# Patient Record
Sex: FEMALE | Race: WHITE | NOT HISPANIC OR LATINO | Employment: FULL TIME | URBAN - METROPOLITAN AREA
[De-identification: names, ages, dates, MRNs, and addresses within clinical notes are randomized per-mention and may not be internally consistent; named-entity substitution may affect disease eponyms.]

---

## 2019-04-23 ENCOUNTER — TELEPHONE (OUTPATIENT)
Dept: ENDOCRINOLOGY | Facility: CLINIC | Age: 39
End: 2019-04-23

## 2019-04-23 ENCOUNTER — OFFICE VISIT (OUTPATIENT)
Dept: ENDOCRINOLOGY | Facility: CLINIC | Age: 39
End: 2019-04-23
Payer: COMMERCIAL

## 2019-04-23 VITALS
HEIGHT: 68 IN | WEIGHT: 234 LBS | BODY MASS INDEX: 35.46 KG/M2 | DIASTOLIC BLOOD PRESSURE: 90 MMHG | SYSTOLIC BLOOD PRESSURE: 138 MMHG

## 2019-04-23 DIAGNOSIS — Z86.39 HISTORY OF GOITER: ICD-10-CM

## 2019-04-23 DIAGNOSIS — E55.9 VITAMIN D DEFICIENCY: ICD-10-CM

## 2019-04-23 DIAGNOSIS — E89.0 POSTOPERATIVE HYPOTHYROIDISM: Primary | ICD-10-CM

## 2019-04-23 DIAGNOSIS — E83.51 HYPOCALCEMIA: ICD-10-CM

## 2019-04-23 PROCEDURE — 99204 OFFICE O/P NEW MOD 45 MIN: CPT | Performed by: INTERNAL MEDICINE

## 2019-04-23 RX ORDER — LEVOTHYROXINE SODIUM 100 MCG
100 TABLET ORAL DAILY
COMMUNITY
End: 2019-05-03 | Stop reason: DRUGHIGH

## 2019-04-23 RX ORDER — LEVOTHYROXINE SODIUM 0.1 MG/1
100 TABLET ORAL DAILY
COMMUNITY
End: 2019-04-23

## 2019-04-24 ENCOUNTER — TELEPHONE (OUTPATIENT)
Dept: ENDOCRINOLOGY | Facility: CLINIC | Age: 39
End: 2019-04-24

## 2019-04-24 DIAGNOSIS — E55.9 VITAMIN D DEFICIENCY: ICD-10-CM

## 2019-04-24 RX ORDER — PHENOL 1.4 %
AEROSOL, SPRAY (ML) MUCOUS MEMBRANE
Start: 2019-04-24 | End: 2019-07-10

## 2019-04-29 ENCOUNTER — APPOINTMENT (OUTPATIENT)
Dept: LAB | Facility: CLINIC | Age: 39
End: 2019-04-29
Payer: COMMERCIAL

## 2019-04-29 DIAGNOSIS — E55.9 VITAMIN D DEFICIENCY: ICD-10-CM

## 2019-04-29 DIAGNOSIS — E83.51 HYPOCALCEMIA: ICD-10-CM

## 2019-04-29 DIAGNOSIS — E89.0 POSTOPERATIVE HYPOTHYROIDISM: ICD-10-CM

## 2019-04-29 LAB
25(OH)D3 SERPL-MCNC: 19.3 NG/ML (ref 30–100)
ALBUMIN SERPL BCP-MCNC: 3.7 G/DL (ref 3.5–5)
ANION GAP SERPL CALCULATED.3IONS-SCNC: 11 MMOL/L (ref 4–13)
BUN SERPL-MCNC: 24 MG/DL (ref 5–25)
CALCIUM SERPL-MCNC: 8.3 MG/DL (ref 8.3–10.1)
CHLORIDE SERPL-SCNC: 104 MMOL/L (ref 100–108)
CO2 SERPL-SCNC: 25 MMOL/L (ref 21–32)
CREAT SERPL-MCNC: 1.04 MG/DL (ref 0.6–1.3)
GFR SERPL CREATININE-BSD FRML MDRD: 67 ML/MIN/1.73SQ M
GLUCOSE P FAST SERPL-MCNC: 93 MG/DL (ref 65–99)
POTASSIUM SERPL-SCNC: 3.5 MMOL/L (ref 3.5–5.3)
PTH-INTACT SERPL-MCNC: <6.3 PG/ML (ref 18.4–80.1)
SODIUM SERPL-SCNC: 140 MMOL/L (ref 136–145)
T4 FREE SERPL-MCNC: 0.29 NG/DL (ref 0.76–1.46)
TSH SERPL DL<=0.05 MIU/L-ACNC: 81.55 UIU/ML (ref 0.36–3.74)

## 2019-04-29 PROCEDURE — 83970 ASSAY OF PARATHORMONE: CPT

## 2019-04-29 PROCEDURE — 82040 ASSAY OF SERUM ALBUMIN: CPT

## 2019-04-29 PROCEDURE — 36415 COLL VENOUS BLD VENIPUNCTURE: CPT

## 2019-04-29 PROCEDURE — 84439 ASSAY OF FREE THYROXINE: CPT

## 2019-04-29 PROCEDURE — 80048 BASIC METABOLIC PNL TOTAL CA: CPT

## 2019-04-29 PROCEDURE — 84443 ASSAY THYROID STIM HORMONE: CPT

## 2019-04-29 PROCEDURE — 82306 VITAMIN D 25 HYDROXY: CPT

## 2019-05-01 ENCOUNTER — TELEPHONE (OUTPATIENT)
Dept: ENDOCRINOLOGY | Facility: CLINIC | Age: 39
End: 2019-05-01

## 2019-05-03 DIAGNOSIS — E89.0 POSTOPERATIVE HYPOTHYROIDISM: Primary | ICD-10-CM

## 2019-05-03 RX ORDER — LEVOTHYROXINE SODIUM 137 MCG
137 TABLET ORAL DAILY
COMMUNITY
End: 2019-05-03 | Stop reason: DRUGHIGH

## 2019-05-03 RX ORDER — LEVOTHYROXINE SODIUM 137 MCG
137 TABLET ORAL DAILY
Qty: 30 TABLET | Refills: 2 | Status: SHIPPED | OUTPATIENT
Start: 2019-05-03 | End: 2019-05-26 | Stop reason: SDUPTHER

## 2019-05-26 DIAGNOSIS — E89.0 POSTOPERATIVE HYPOTHYROIDISM: ICD-10-CM

## 2019-05-28 RX ORDER — LEVOTHYROXINE SODIUM 137 MCG
TABLET ORAL
Qty: 30 TABLET | Refills: 0 | Status: SHIPPED | OUTPATIENT
Start: 2019-05-28 | End: 2019-07-10 | Stop reason: DRUGHIGH

## 2019-07-09 ENCOUNTER — APPOINTMENT (OUTPATIENT)
Dept: LAB | Facility: CLINIC | Age: 39
End: 2019-07-09
Payer: COMMERCIAL

## 2019-07-09 DIAGNOSIS — E83.51 HYPOCALCEMIA: ICD-10-CM

## 2019-07-09 DIAGNOSIS — E89.0 POSTOPERATIVE HYPOTHYROIDISM: ICD-10-CM

## 2019-07-09 LAB
ANION GAP SERPL CALCULATED.3IONS-SCNC: 7 MMOL/L (ref 4–13)
BUN SERPL-MCNC: 15 MG/DL (ref 5–25)
CALCIUM SERPL-MCNC: 7.4 MG/DL (ref 8.3–10.1)
CHLORIDE SERPL-SCNC: 103 MMOL/L (ref 100–108)
CO2 SERPL-SCNC: 28 MMOL/L (ref 21–32)
CREAT SERPL-MCNC: 0.95 MG/DL (ref 0.6–1.3)
GFR SERPL CREATININE-BSD FRML MDRD: 75 ML/MIN/1.73SQ M
GLUCOSE P FAST SERPL-MCNC: 85 MG/DL (ref 65–99)
POTASSIUM SERPL-SCNC: 4 MMOL/L (ref 3.5–5.3)
SODIUM SERPL-SCNC: 138 MMOL/L (ref 136–145)
T4 FREE SERPL-MCNC: 0.82 NG/DL (ref 0.76–1.46)
TSH SERPL DL<=0.05 MIU/L-ACNC: 27.11 UIU/ML (ref 0.36–3.74)

## 2019-07-09 PROCEDURE — 84443 ASSAY THYROID STIM HORMONE: CPT

## 2019-07-09 PROCEDURE — 36415 COLL VENOUS BLD VENIPUNCTURE: CPT

## 2019-07-09 PROCEDURE — 84439 ASSAY OF FREE THYROXINE: CPT

## 2019-07-09 PROCEDURE — 80048 BASIC METABOLIC PNL TOTAL CA: CPT

## 2019-07-10 ENCOUNTER — OFFICE VISIT (OUTPATIENT)
Dept: ENDOCRINOLOGY | Facility: CLINIC | Age: 39
End: 2019-07-10

## 2019-07-10 VITALS
SYSTOLIC BLOOD PRESSURE: 110 MMHG | HEIGHT: 68 IN | WEIGHT: 223 LBS | DIASTOLIC BLOOD PRESSURE: 60 MMHG | BODY MASS INDEX: 33.8 KG/M2

## 2019-07-10 DIAGNOSIS — E89.0 POSTOPERATIVE HYPOTHYROIDISM: ICD-10-CM

## 2019-07-10 DIAGNOSIS — E89.0 POSTOPERATIVE HYPOTHYROIDISM: Primary | ICD-10-CM

## 2019-07-10 DIAGNOSIS — E55.9 VITAMIN D DEFICIENCY: ICD-10-CM

## 2019-07-10 DIAGNOSIS — E83.51 HYPOCALCEMIA: ICD-10-CM

## 2019-07-10 PROCEDURE — 99214 OFFICE O/P EST MOD 30 MIN: CPT | Performed by: PHYSICIAN ASSISTANT

## 2019-07-10 RX ORDER — ACETAMINOPHEN 160 MG
2000 TABLET,DISINTEGRATING ORAL DAILY
Start: 2019-07-10 | End: 2019-12-02

## 2019-07-10 RX ORDER — CALCITRIOL 0.25 UG/1
0.25 CAPSULE, LIQUID FILLED ORAL DAILY
Qty: 90 CAPSULE | Refills: 1 | Status: SHIPPED | OUTPATIENT
Start: 2019-07-10 | End: 2019-12-02

## 2019-07-10 RX ORDER — LEVOTHYROXINE SODIUM 137 MCG
TABLET ORAL
Qty: 30 TABLET | Refills: 0
Start: 2019-07-10 | End: 2019-07-10

## 2019-07-10 RX ORDER — LEVOTHYROXINE SODIUM 0.15 MG/1
150 TABLET ORAL DAILY
Qty: 30 TABLET | Refills: 3 | Status: SHIPPED | OUTPATIENT
Start: 2019-07-10 | End: 2019-10-16 | Stop reason: SDUPTHER

## 2019-07-10 NOTE — TELEPHONE ENCOUNTER
----- Message from Fabian Rivers MD sent at 7/9/2019  3:05 PM EDT -----  Please call the patient regarding her abnormal result  please  Inform pt to increase the dose by taking extra tablet of 137 mcg every Thursday    She should follow up as scheduled

## 2019-07-10 NOTE — PROGRESS NOTES
Patient Progress Note    CC: hypothyroidism     Referring Provider  Wing Allen, [address] [facility], [address] [facility]     History of Present Illness:     Patient is a 25-year-old female with postsurgical hypothyroidism and hypoparathyroidism  She had a total thyroidectomy in 2015 for goiter causing obstructive symptoms  She reports pathology was benign  Patient is on Synthroid 137 mcg 1 tablet daily except 2 tablets on Thursday  Patient is taking it 1 hr before breakfast on an empty stomach and at least 4 hrs apart from supplements  Tolerating medication well  Most recent thyroid function tests abnormal, TSH elevated at 27 115 but improved from 81 546  And free T4 normal at 0 82, improved from 0 29  Dose of levothyroxine was adjusted after reviewing most recent labs  PTH in April 2019 was low, less than 6 3  Calcium was normal at that time a 8 3 but repeat calcium yesterday was low at 7 4  In the past she was on Rocaltrol but reports her endocrinologists had her discontinue it as calcium levels had improved  Denies muscle weakness/twitching/spasms, paresthesias, excessive fatigue  Vitamin-D low in April at 19 3  Patient is not currently taking any vitamin D3 supplementation        Patient Active Problem List   Diagnosis    Hypocalcemia    Postoperative hypothyroidism    Vitamin D deficiency     Past Medical History:   Diagnosis Date    H/O total thyroidectomy 10/2016    History of parathyroidectomy 10/2015      Past Surgical History:   Procedure Laterality Date    PARATHYROIDECTOMY  10/2015    TOTAL THYROIDECTOMY  10/2015      Family History   Problem Relation Age of Onset    Hypothyroidism Mother     Hypothyroidism Maternal Grandmother     Diabetes type II Maternal Grandmother      Social History     Tobacco Use    Smoking status: Current Every Day Smoker    Smokeless tobacco: Never Used   Substance Use Topics    Alcohol use: Never     Frequency: Never     Allergies   Allergen Reactions    Latex      Other reaction(s): swollen sore sensation     Current Outpatient Medications   Medication Sig Dispense Refill    calcitriol (ROCALTROL) 0 25 mcg capsule Take 1 capsule (0 25 mcg total) by mouth daily 90 capsule 1    Cholecalciferol (VITAMIN D3) 2000 units capsule Take 1 capsule (2,000 Units total) by mouth daily      levothyroxine (SYNTHROID) 150 mcg tablet Take 1 tablet (150 mcg total) by mouth daily (BRAND NECESSARY) 30 tablet 3     No current facility-administered medications for this visit  Review of Systems   Constitutional: Negative for activity change, appetite change, fatigue and unexpected weight change  HENT: Negative for trouble swallowing  Eyes: Negative for visual disturbance  Respiratory: Negative for shortness of breath  Cardiovascular: Negative for chest pain and palpitations  Gastrointestinal: Negative for constipation and diarrhea  Endocrine: Negative for cold intolerance and heat intolerance  Musculoskeletal: Negative  Neurological: Negative for dizziness, tremors and weakness  Psychiatric/Behavioral: Negative  Physical Exam:  Body mass index is 33 91 kg/m²  /60   Ht 5' 8" (1 727 m)   Wt 101 kg (223 lb)   BMI 33 91 kg/m²    Wt Readings from Last 3 Encounters:   07/10/19 101 kg (223 lb)   04/23/19 106 kg (234 lb)       Physical Exam   Constitutional: She appears well-developed and well-nourished  HENT:   Head: Normocephalic  Eyes: Pupils are equal, round, and reactive to light  EOM are normal  No scleral icterus  Neck: Neck supple  No thyromegaly present  Cardiovascular: Normal rate and regular rhythm  No murmur heard  Pulses:       Radial pulses are 2+ on the right side, and 2+ on the left side  Pulmonary/Chest: Effort normal and breath sounds normal  No respiratory distress  She has no wheezes  Neurological: She is alert  She has normal reflexes  Negative Chovstek's sign   Skin: Skin is warm and dry  Psychiatric: She has a normal mood and affect  Nursing note and vitals reviewed  Patient's shoes and socks were not removed  Labs:   Component      Latest Ref Rng & Units 4/29/2019 7/9/2019   Sodium      136 - 145 mmol/L 140 138   Potassium      3 5 - 5 3 mmol/L 3 5 4 0   Chloride      100 - 108 mmol/L 104 103   CO2      21 - 32 mmol/L 25 28   Anion Gap      4 - 13 mmol/L 11 7   BUN      5 - 25 mg/dL 24 15   Creatinine      0 60 - 1 30 mg/dL 1 04 0 95   GLUCOSE FASTING      65 - 99 mg/dL 93 85   Calcium      8 3 - 10 1 mg/dL 8 3 7 4 (L)   eGFR      ml/min/1 73sq m 67 75   Albumin      3 5 - 5 0 g/dL 3 7    PARATHYROID HORMONE      18 4 - 80 1 pg/mL <6 3 (L)    Vit D, 25-Hydroxy      30 0 - 100 0 ng/mL 19 3 (L)    TSH 3RD GENERATON      0 358 - 3 740 uIU/mL 81 546 (H) 27 115 (H)   Free T4      0 76 - 1 46 ng/dL 0 29 (L) 0 82       Plan:    Diagnoses and all orders for this visit:    Postoperative hypothyroidism  Thyroid function tests remain abnormal but have improved  TSH 27 115 and free T4 0 82  Increase Synthroid to 150 mcg daily  Recheck TSH and free T4 in 6 weeks and prior to next visit  -     T4, free; Future  -     TSH, 3rd generation; Future  -     T4, free; Future  -     TSH, 3rd generation; Future  -     levothyroxine (SYNTHROID) 150 mcg tablet; Take 1 tablet (150 mcg total) by mouth daily (BRAND NECESSARY)    Hypocalcemia  Labs from April showed low PTH  Most recent calcium low at 7 4  Start Rocaltrol 0 25 mcg daily  Discussed signs/symptoms of hypocalcemia for which she should call immediately  -     Basic metabolic panel; Future  -     Albumin; Future  -     Albumin; Future  -     Basic metabolic panel; Future  -     calcitriol (ROCALTROL) 0 25 mcg capsule; Take 1 capsule (0 25 mcg total) by mouth daily    Vitamin D deficiency  Vitamin-D previously low at 19 3  Take vitamin D3 2000 International Units daily  -     Vitamin D 25 hydroxy;  Future  -     Cholecalciferol (VITAMIN D3) 2000 units capsule; Take 1 capsule (2,000 Units total) by mouth daily        Discussed with the patient and all questions fully answered  She will call me if any problems arise      Counseled patient on diagnostic results, prognosis, risk and benefit of treatment options, instruction for management, importance of treatment compliance, risk  factor reduction and impressions      Kimberly Angel PA-C

## 2019-10-16 DIAGNOSIS — E89.0 POSTOPERATIVE HYPOTHYROIDISM: ICD-10-CM

## 2019-10-16 RX ORDER — LEVOTHYROXINE SODIUM 150 UG/1
150 TABLET ORAL DAILY
Qty: 30 TABLET | Refills: 0 | Status: SHIPPED | OUTPATIENT
Start: 2019-10-16 | End: 2019-11-19 | Stop reason: SDUPTHER

## 2019-11-15 ENCOUNTER — LAB (OUTPATIENT)
Dept: LAB | Facility: CLINIC | Age: 39
End: 2019-11-15
Payer: COMMERCIAL

## 2019-11-15 ENCOUNTER — TRANSCRIBE ORDERS (OUTPATIENT)
Dept: LAB | Facility: CLINIC | Age: 39
End: 2019-11-15

## 2019-11-15 DIAGNOSIS — E55.9 VITAMIN D DEFICIENCY: ICD-10-CM

## 2019-11-15 DIAGNOSIS — E83.51 HYPOCALCEMIA: ICD-10-CM

## 2019-11-15 DIAGNOSIS — E89.0 POSTOPERATIVE HYPOTHYROIDISM: ICD-10-CM

## 2019-11-15 LAB
25(OH)D3 SERPL-MCNC: 26.9 NG/ML (ref 30–100)
ALBUMIN SERPL BCP-MCNC: 3.6 G/DL (ref 3.5–5)
ANION GAP SERPL CALCULATED.3IONS-SCNC: 12 MMOL/L (ref 4–13)
BUN SERPL-MCNC: 14 MG/DL (ref 5–25)
CALCIUM SERPL-MCNC: 7.1 MG/DL (ref 8.3–10.1)
CHLORIDE SERPL-SCNC: 103 MMOL/L (ref 100–108)
CO2 SERPL-SCNC: 27 MMOL/L (ref 21–32)
CREAT SERPL-MCNC: 0.87 MG/DL (ref 0.6–1.3)
GFR SERPL CREATININE-BSD FRML MDRD: 84 ML/MIN/1.73SQ M
GLUCOSE P FAST SERPL-MCNC: 103 MG/DL (ref 65–99)
POTASSIUM SERPL-SCNC: 3.6 MMOL/L (ref 3.5–5.3)
SODIUM SERPL-SCNC: 142 MMOL/L (ref 136–145)
T4 FREE SERPL-MCNC: 1.07 NG/DL (ref 0.76–1.46)
TSH SERPL DL<=0.05 MIU/L-ACNC: 0.35 UIU/ML (ref 0.36–3.74)

## 2019-11-15 PROCEDURE — 36415 COLL VENOUS BLD VENIPUNCTURE: CPT

## 2019-11-15 PROCEDURE — 84439 ASSAY OF FREE THYROXINE: CPT

## 2019-11-15 PROCEDURE — 82306 VITAMIN D 25 HYDROXY: CPT

## 2019-11-15 PROCEDURE — 82040 ASSAY OF SERUM ALBUMIN: CPT

## 2019-11-15 PROCEDURE — 84443 ASSAY THYROID STIM HORMONE: CPT

## 2019-11-15 PROCEDURE — 80048 BASIC METABOLIC PNL TOTAL CA: CPT

## 2019-11-19 DIAGNOSIS — E89.0 POSTOPERATIVE HYPOTHYROIDISM: ICD-10-CM

## 2019-11-19 RX ORDER — LEVOTHYROXINE SODIUM 150 UG/1
150 TABLET ORAL DAILY
Qty: 30 TABLET | Refills: 0 | Status: SHIPPED | OUTPATIENT
Start: 2019-11-19 | End: 2019-12-02 | Stop reason: SDUPTHER

## 2019-12-02 ENCOUNTER — OFFICE VISIT (OUTPATIENT)
Dept: ENDOCRINOLOGY | Facility: CLINIC | Age: 39
End: 2019-12-02
Payer: COMMERCIAL

## 2019-12-02 VITALS
BODY MASS INDEX: 34.25 KG/M2 | DIASTOLIC BLOOD PRESSURE: 68 MMHG | SYSTOLIC BLOOD PRESSURE: 110 MMHG | HEIGHT: 68 IN | WEIGHT: 226 LBS

## 2019-12-02 DIAGNOSIS — E83.51 HYPOCALCEMIA: ICD-10-CM

## 2019-12-02 DIAGNOSIS — E89.2 HISTORY OF PARATHYROIDECTOMY (HCC): ICD-10-CM

## 2019-12-02 DIAGNOSIS — E89.0 POSTOPERATIVE HYPOTHYROIDISM: Primary | ICD-10-CM

## 2019-12-02 DIAGNOSIS — E55.9 VITAMIN D DEFICIENCY: ICD-10-CM

## 2019-12-02 PROCEDURE — 99214 OFFICE O/P EST MOD 30 MIN: CPT | Performed by: PHYSICIAN ASSISTANT

## 2019-12-02 RX ORDER — PHENOL 1.4 %
600 AEROSOL, SPRAY (ML) MUCOUS MEMBRANE 3 TIMES DAILY
Start: 2019-12-02 | End: 2021-02-11

## 2019-12-02 RX ORDER — LEVOTHYROXINE SODIUM 150 UG/1
TABLET ORAL
Qty: 90 TABLET | Refills: 1 | Status: SHIPPED | OUTPATIENT
Start: 2019-12-02 | End: 2019-12-27 | Stop reason: SDUPTHER

## 2019-12-02 RX ORDER — ACETAMINOPHEN 160 MG
4000 TABLET,DISINTEGRATING ORAL DAILY
Start: 2019-12-02 | End: 2021-11-03

## 2019-12-02 RX ORDER — CALCITRIOL 0.25 UG/1
0.25 CAPSULE, LIQUID FILLED ORAL 2 TIMES DAILY
Qty: 180 CAPSULE | Refills: 1 | Status: SHIPPED | OUTPATIENT
Start: 2019-12-02 | End: 2020-06-10 | Stop reason: SDUPTHER

## 2019-12-02 NOTE — PROGRESS NOTES
Patient Progress Note    CC: hypothyroidism     Referring Provider  Figueroa Bush, [address] [facility], [address] [place] [address]     History of Present Illness:     Patient is a 60-year-old female here for follow-up of postsurgical hypothyroidism and hypoparathyroidism  She underwent a total thyroidectomy in 2015 for goiter causing obstructive symptoms; per patient pathology was benign  Patient is on Synthroid 150 mcg 1 tablet daily  Patient is taking it 1 hr before breakfast on an empty stomach and at least 4 hrs apart from supplements  Tolerating medication well  No history of external radiation to head/neck/chest   No family history of thyroid cancer  No recent Iodine loading in form of medication, erbs or kelp supplements or radiological diagnostic studies  Hypoparathyroidism:  Parathyroid levels were less than 6 3 earlier this year  Most recent calcium is low at 7 1  Patient is taking Rocaltrol 0 25 mcg 1 capsule daily  She thinks she is also taking calcium 800 mg  Has noticed some twitching of muscles in her hands  Denies muscle weakness, spasms, paresthesias, excessive fatigue      Vitamin-D deficiency:  Patient is taking vitamin D3 2000 International Units daily    Patient Active Problem List   Diagnosis    Hypocalcemia    Postoperative hypothyroidism    Vitamin D deficiency    History of parathyroidectomy     Past Medical History:   Diagnosis Date    H/O total thyroidectomy 10/2016    History of parathyroidectomy 10/2015      Past Surgical History:   Procedure Laterality Date    PARATHYROIDECTOMY  10/2015    TOTAL THYROIDECTOMY  10/2015      Family History   Problem Relation Age of Onset    Hypothyroidism Mother     Hypothyroidism Maternal Grandmother     Diabetes type II Maternal Grandmother      Social History     Tobacco Use    Smoking status: Current Every Day Smoker    Smokeless tobacco: Never Used   Substance Use Topics    Alcohol use: Never     Frequency: Never     Allergies Allergen Reactions    Latex      Other reaction(s): swollen sore sensation     Current Outpatient Medications   Medication Sig Dispense Refill    calcitriol (ROCALTROL) 0 25 mcg capsule Take 1 capsule (0 25 mcg total) by mouth 2 (two) times a day 180 capsule 1    Cholecalciferol (VITAMIN D3) 50 MCG (2000 UT) capsule Take 2 capsules (4,000 Units total) by mouth daily      SYNTHROID 150 MCG tablet Take 1 tablet daily Monday through Saturday and 1/2 tablet on Sunday  (BRAND NECESSARY) 90 tablet 1    calcium carbonate (OS-DENNIS) 600 MG tablet Take 1 tablet (600 mg total) by mouth 3 (three) times a day       No current facility-administered medications for this visit  Review of Systems   Constitutional: Negative for activity change, appetite change, fatigue and unexpected weight change  HENT: Negative for trouble swallowing  Eyes: Negative for visual disturbance  Respiratory: Negative for shortness of breath  Cardiovascular: Negative for chest pain and palpitations  Gastrointestinal: Negative for constipation and diarrhea  Endocrine: Positive for cold intolerance  Negative for heat intolerance  Musculoskeletal: Negative  Skin: Negative  Neurological: Positive for numbness (occasional)  Psychiatric/Behavioral: Negative  Physical Exam:  Body mass index is 34 36 kg/m²  /68   Ht 5' 8" (1 727 m)   Wt 103 kg (226 lb)   BMI 34 36 kg/m²    Wt Readings from Last 3 Encounters:   12/02/19 103 kg (226 lb)   07/10/19 101 kg (223 lb)   04/23/19 106 kg (234 lb)       Physical Exam   Constitutional: She appears well-developed and well-nourished  HENT:   Head: Normocephalic  Eyes: Pupils are equal, round, and reactive to light  EOM are normal  No scleral icterus  Neck: Neck supple  No thyromegaly present  Cardiovascular: Normal rate and regular rhythm  No murmur heard  Pulses:       Radial pulses are 2+ on the right side, and 2+ on the left side     Pulmonary/Chest: Effort normal and breath sounds normal  No respiratory distress  She has no wheezes  Neurological: She is alert  She has normal reflexes  Chovstek sign noted - mild twitching   Skin: Skin is warm and dry  Psychiatric: She has a normal mood and affect  Nursing note and vitals reviewed  Patient's shoes and socks were not removed  Labs:   Component      Latest Ref Rng & Units 4/29/2019 7/9/2019 11/15/2019   Sodium      136 - 145 mmol/L 140 138 142   Potassium      3 5 - 5 3 mmol/L 3 5 4 0 3 6   Chloride      100 - 108 mmol/L 104 103 103   CO2      21 - 32 mmol/L 25 28 27   Anion Gap      4 - 13 mmol/L 11 7 12   BUN      5 - 25 mg/dL 24 15 14   Creatinine      0 60 - 1 30 mg/dL 1 04 0 95 0 87   GLUCOSE FASTING      65 - 99 mg/dL 93 85 103 (H)   Calcium      8 3 - 10 1 mg/dL 8 3 7 4 (L) 7 1 (L)   eGFR      ml/min/1 73sq m 67 75 84   Albumin      3 5 - 5 0 g/dL 3 7  3 6   PARATHYROID HORMONE      18 4 - 80 1 pg/mL <6 3 (L)     Vit D, 25-Hydroxy      30 0 - 100 0 ng/mL 19 3 (L)  26 9 (L)   TSH 3RD GENERATON      0 358 - 3 740 uIU/mL 81 546 (H) 27 115 (H) 0 349 (L)   Free T4      0 76 - 1 46 ng/dL 0 29 (L) 0 82 1 07     Plan:    Diagnoses and all orders for this visit:    Postoperative hypothyroidism  Thyroid function tests abnormal, TSH 0 349 and free T4 1 07  Decrease dose of Synthroid by half a tablet on Sunday  Repeat TSH and free T4 in 6 weeks and prior to next visit  -     SYNTHROID 150 MCG tablet; Take 1 tablet daily Monday through Saturday and 1/2 tablet on Sunday  (BRAND NECESSARY)  -     T4, free; Future  -     TSH, 3rd generation; Future  -     T4, free; Future  -     TSH, 3rd generation; Future    Hypocalcemia / History of parathyroidectomy  Parathyroid hormone levels low  Most recent calcium level low at 7 1  Patient does have some symptoms of hypocalcemia such as occasional numbness and muscle twitching    Increase Rocaltrol to 0 25 mcg BID  Increase calcium carbonate to 600 mg TID  Repeat blood work next week  -     Basic metabolic panel; Future  -     calcitriol (ROCALTROL) 0 25 mcg capsule; Take 1 capsule (0 25 mcg total) by mouth 2 (two) times a day  -     calcium carbonate (OS-DENNIS) 600 MG tablet; Take 1 tablet (600 mg total) by mouth 3 (three) times a day  -     Basic metabolic panel; Future  -     Albumin; Future    Vitamin D deficiency  Vitamin-D improved but remains low at 26 9  Increase vitamin-D3 intake to 4000 International Units daily  -     Vitamin D 25 hydroxy; Future  -     Cholecalciferol (VITAMIN D3) 50 MCG (2000 UT) capsule; Take 2 capsules (4,000 Units total) by mouth daily      Discussed with the patient and all questions fully answered  She will call me if any problems arise      Counseled patient on diagnostic results, prognosis, risk and benefit of treatment options, instruction for management, importance of treatment compliance, risk  factor reduction and impressions      Kimberly Angel PA-C

## 2019-12-27 DIAGNOSIS — E89.0 POSTOPERATIVE HYPOTHYROIDISM: ICD-10-CM

## 2019-12-30 RX ORDER — LEVOTHYROXINE SODIUM 150 UG/1
TABLET ORAL
Qty: 90 TABLET | Refills: 1 | Status: SHIPPED | OUTPATIENT
Start: 2019-12-30 | End: 2020-06-10 | Stop reason: SDUPTHER

## 2020-06-08 ENCOUNTER — TRANSCRIBE ORDERS (OUTPATIENT)
Dept: LAB | Facility: CLINIC | Age: 40
End: 2020-06-08

## 2020-06-08 ENCOUNTER — APPOINTMENT (OUTPATIENT)
Dept: LAB | Facility: CLINIC | Age: 40
End: 2020-06-08
Payer: COMMERCIAL

## 2020-06-08 DIAGNOSIS — E83.51 HYPOCALCEMIA: ICD-10-CM

## 2020-06-08 DIAGNOSIS — E89.0 POSTOPERATIVE HYPOTHYROIDISM: ICD-10-CM

## 2020-06-08 DIAGNOSIS — E55.9 VITAMIN D DEFICIENCY: ICD-10-CM

## 2020-06-08 DIAGNOSIS — E89.2 HISTORY OF PARATHYROIDECTOMY (HCC): ICD-10-CM

## 2020-06-08 LAB
25(OH)D3 SERPL-MCNC: 21.2 NG/ML (ref 30–100)
ALBUMIN SERPL BCP-MCNC: 3.9 G/DL (ref 3.5–5)
ANION GAP SERPL CALCULATED.3IONS-SCNC: 6 MMOL/L (ref 4–13)
BUN SERPL-MCNC: 18 MG/DL (ref 5–25)
CALCIUM SERPL-MCNC: 7.2 MG/DL (ref 8.3–10.1)
CHLORIDE SERPL-SCNC: 103 MMOL/L (ref 100–108)
CO2 SERPL-SCNC: 29 MMOL/L (ref 21–32)
CREAT SERPL-MCNC: 1.04 MG/DL (ref 0.6–1.3)
GFR SERPL CREATININE-BSD FRML MDRD: 67 ML/MIN/1.73SQ M
GLUCOSE P FAST SERPL-MCNC: 82 MG/DL (ref 65–99)
POTASSIUM SERPL-SCNC: 4.2 MMOL/L (ref 3.5–5.3)
SODIUM SERPL-SCNC: 138 MMOL/L (ref 136–145)
T4 FREE SERPL-MCNC: 0.46 NG/DL (ref 0.76–1.46)
TSH SERPL DL<=0.05 MIU/L-ACNC: 75.21 UIU/ML (ref 0.36–3.74)

## 2020-06-08 PROCEDURE — 82306 VITAMIN D 25 HYDROXY: CPT

## 2020-06-08 PROCEDURE — 82040 ASSAY OF SERUM ALBUMIN: CPT

## 2020-06-08 PROCEDURE — 84439 ASSAY OF FREE THYROXINE: CPT

## 2020-06-08 PROCEDURE — 36415 COLL VENOUS BLD VENIPUNCTURE: CPT

## 2020-06-08 PROCEDURE — 84443 ASSAY THYROID STIM HORMONE: CPT

## 2020-06-08 PROCEDURE — 80048 BASIC METABOLIC PNL TOTAL CA: CPT

## 2020-06-10 ENCOUNTER — OFFICE VISIT (OUTPATIENT)
Dept: ENDOCRINOLOGY | Facility: CLINIC | Age: 40
End: 2020-06-10
Payer: COMMERCIAL

## 2020-06-10 VITALS
WEIGHT: 228 LBS | BODY MASS INDEX: 34.67 KG/M2 | SYSTOLIC BLOOD PRESSURE: 114 MMHG | HEART RATE: 108 BPM | TEMPERATURE: 98.5 F | DIASTOLIC BLOOD PRESSURE: 68 MMHG

## 2020-06-10 DIAGNOSIS — E83.51 HYPOCALCEMIA: ICD-10-CM

## 2020-06-10 DIAGNOSIS — E89.0 POSTOPERATIVE HYPOTHYROIDISM: Primary | ICD-10-CM

## 2020-06-10 DIAGNOSIS — E89.2 HISTORY OF PARATHYROIDECTOMY (HCC): ICD-10-CM

## 2020-06-10 DIAGNOSIS — E55.9 VITAMIN D DEFICIENCY: ICD-10-CM

## 2020-06-10 PROCEDURE — 99214 OFFICE O/P EST MOD 30 MIN: CPT | Performed by: PHYSICIAN ASSISTANT

## 2020-06-10 RX ORDER — LEVOTHYROXINE SODIUM 150 UG/1
TABLET ORAL
Qty: 90 TABLET | Refills: 3 | Status: SHIPPED | OUTPATIENT
Start: 2020-06-10 | End: 2021-09-06

## 2020-06-10 RX ORDER — CALCITRIOL 0.25 UG/1
0.25 CAPSULE, LIQUID FILLED ORAL 2 TIMES DAILY
Qty: 180 CAPSULE | Refills: 3 | Status: SHIPPED | OUTPATIENT
Start: 2020-06-10 | End: 2021-11-03 | Stop reason: SDUPTHER

## 2020-12-10 ENCOUNTER — TELEPHONE (OUTPATIENT)
Dept: ENDOCRINOLOGY | Facility: CLINIC | Age: 40
End: 2020-12-10

## 2020-12-14 ENCOUNTER — TELEPHONE (OUTPATIENT)
Dept: OTHER | Facility: OTHER | Age: 40
End: 2020-12-14

## 2021-02-09 ENCOUNTER — TRANSCRIBE ORDERS (OUTPATIENT)
Dept: LAB | Facility: CLINIC | Age: 41
End: 2021-02-09

## 2021-02-09 ENCOUNTER — APPOINTMENT (OUTPATIENT)
Dept: LAB | Facility: CLINIC | Age: 41
End: 2021-02-09
Payer: COMMERCIAL

## 2021-02-09 DIAGNOSIS — E89.0 POSTOPERATIVE HYPOTHYROIDISM: ICD-10-CM

## 2021-02-09 DIAGNOSIS — E55.9 VITAMIN D DEFICIENCY: ICD-10-CM

## 2021-02-09 DIAGNOSIS — E83.51 HYPOCALCEMIA: ICD-10-CM

## 2021-02-09 LAB
25(OH)D3 SERPL-MCNC: 23.2 NG/ML (ref 30–100)
ALBUMIN SERPL BCP-MCNC: 3.8 G/DL (ref 3.5–5)
ANION GAP SERPL CALCULATED.3IONS-SCNC: 12 MMOL/L (ref 4–13)
BUN SERPL-MCNC: 14 MG/DL (ref 5–25)
CALCIUM SERPL-MCNC: 7.1 MG/DL (ref 8.3–10.1)
CHLORIDE SERPL-SCNC: 105 MMOL/L (ref 100–108)
CO2 SERPL-SCNC: 24 MMOL/L (ref 21–32)
CREAT SERPL-MCNC: 0.87 MG/DL (ref 0.6–1.3)
GFR SERPL CREATININE-BSD FRML MDRD: 83 ML/MIN/1.73SQ M
GLUCOSE P FAST SERPL-MCNC: 115 MG/DL (ref 65–99)
POTASSIUM SERPL-SCNC: 3.9 MMOL/L (ref 3.5–5.3)
SODIUM SERPL-SCNC: 141 MMOL/L (ref 136–145)
T4 FREE SERPL-MCNC: 1.09 NG/DL (ref 0.76–1.46)
TSH SERPL DL<=0.05 MIU/L-ACNC: 0.82 UIU/ML (ref 0.36–3.74)

## 2021-02-09 PROCEDURE — 80048 BASIC METABOLIC PNL TOTAL CA: CPT

## 2021-02-09 PROCEDURE — 82306 VITAMIN D 25 HYDROXY: CPT

## 2021-02-09 PROCEDURE — 84439 ASSAY OF FREE THYROXINE: CPT

## 2021-02-09 PROCEDURE — 36415 COLL VENOUS BLD VENIPUNCTURE: CPT

## 2021-02-09 PROCEDURE — 82040 ASSAY OF SERUM ALBUMIN: CPT

## 2021-02-09 PROCEDURE — 84443 ASSAY THYROID STIM HORMONE: CPT

## 2021-02-11 ENCOUNTER — OFFICE VISIT (OUTPATIENT)
Dept: ENDOCRINOLOGY | Facility: CLINIC | Age: 41
End: 2021-02-11
Payer: COMMERCIAL

## 2021-02-11 VITALS
TEMPERATURE: 97.5 F | HEART RATE: 84 BPM | WEIGHT: 238 LBS | SYSTOLIC BLOOD PRESSURE: 126 MMHG | HEIGHT: 68 IN | DIASTOLIC BLOOD PRESSURE: 90 MMHG | BODY MASS INDEX: 36.07 KG/M2

## 2021-02-11 DIAGNOSIS — E55.9 VITAMIN D DEFICIENCY: ICD-10-CM

## 2021-02-11 DIAGNOSIS — E83.51 HYPOCALCEMIA: ICD-10-CM

## 2021-02-11 DIAGNOSIS — E89.0 POSTOPERATIVE HYPOTHYROIDISM: Primary | ICD-10-CM

## 2021-02-11 PROCEDURE — 99214 OFFICE O/P EST MOD 30 MIN: CPT | Performed by: PHYSICIAN ASSISTANT

## 2021-02-11 RX ORDER — PHENOL 1.4 %
1200 AEROSOL, SPRAY (ML) MUCOUS MEMBRANE 2 TIMES DAILY WITH MEALS
Start: 2021-02-11

## 2021-02-11 NOTE — PROGRESS NOTES
Patient Progress Note    CC: hypothyroidism, hypocalcemia    Referring Provider  Maria E Matos Md  [id number]  [address] [address]  [place],  [address] [place] [address]     History of Present Illness:     Patient is a 16-year-old female here for follow-up of postsurgical hypothyroidism and hypoparathyroidism  She underwent a total thyroidectomy in 2015 for goiter causing obstructive symptoms; per patient pathology was benign  Patient is on Synthroid 150 mcg 1 tablet daily Monday - Saturday and 1/2 tablet on Sunday  Patient is taking it 1 hr before breakfast on an empty stomach and at least 4 hrs apart from supplements  Tolerating medication well  No history of external radiation to head/neck/chest  No family history of thyroid cancer  No recent Iodine loading in form of medication, biotin or kelp supplements or radiological diagnostic studies  Hypoparathyroidism: Parathyroid levels were less than 6 3 in 2019  Most recent corrected calcium is low at 7 3  Patient was taking Rocaltrol 0 25 mcg 1 capsule BID but has not been using it for the past 2 months  She is taking calcium 600 mg 2 tabs BID  Denies twitching, muscle weakness, spasms  Has occasional numbness/tingling around the mouth  Vitamin-D deficiency: Patient was taking vitamin D3 4000 International Units daily, but has not been on it for 2 months  She is taking a prenatal vitamin because she thinks that has most essential vitamins in it       Patient Active Problem List   Diagnosis    Hypocalcemia    Postoperative hypothyroidism    Vitamin D deficiency    History of parathyroidectomy     Past Medical History:   Diagnosis Date    H/O total thyroidectomy 10/2016    History of parathyroidectomy 10/2015      Past Surgical History:   Procedure Laterality Date    PARATHYROIDECTOMY  10/2015    TOTAL THYROIDECTOMY  10/2015      Family History   Problem Relation Age of Onset    Hypothyroidism Mother     Hypothyroidism Maternal Grandmother     Diabetes type II Maternal Grandmother      Social History     Tobacco Use    Smoking status: Current Every Day Smoker    Smokeless tobacco: Never Used   Substance Use Topics    Alcohol use: Never     Frequency: Never     Allergies   Allergen Reactions    Latex      Other reaction(s): swollen sore sensation     Current Outpatient Medications   Medication Sig Dispense Refill    calcium carbonate (OS-DENNIS) 600 MG tablet Take 1 tablet (600 mg total) by mouth 3 (three) times a day      SYNTHROID 150 MCG tablet Take 1 tablet daily Monday through Saturday and 1/2 tablet on Sunday  (BRAND NECESSARY) 90 tablet 3    calcitriol (ROCALTROL) 0 25 mcg capsule Take 1 capsule (0 25 mcg total) by mouth 2 (two) times a day (Patient not taking: Reported on 2/11/2021) 180 capsule 3    Cholecalciferol (VITAMIN D3) 50 MCG (2000 UT) capsule Take 2 capsules (4,000 Units total) by mouth daily (Patient not taking: Reported on 2/11/2021)       No current facility-administered medications for this visit  Review of Systems   Constitutional: Positive for fatigue  Negative for activity change, appetite change and unexpected weight change (weight gain)  HENT: Negative for trouble swallowing  Eyes: Negative for visual disturbance  Respiratory: Negative for shortness of breath  Cardiovascular: Negative for chest pain and palpitations  Gastrointestinal: Negative for constipation and diarrhea  Endocrine: Negative for polydipsia and polyuria  Musculoskeletal: Positive for arthralgias (mild, intermittent)  Skin: Negative  Neurological: Negative for numbness  Psychiatric/Behavioral: Negative  Physical Exam:  Body mass index is 36 19 kg/m²    /90   Pulse 84   Temp 97 5 °F (36 4 °C) (Tympanic)   Ht 5' 8" (1 727 m)   Wt 108 kg (238 lb)   BMI 36 19 kg/m²    Wt Readings from Last 3 Encounters:   02/11/21 108 kg (238 lb)   06/10/20 103 kg (228 lb)   12/02/19 103 kg (226 lb)       Physical Exam  Vitals signs and nursing note reviewed  Constitutional:       Appearance: She is well-developed  HENT:      Head: Normocephalic  Eyes:      General: No scleral icterus  Pupils: Pupils are equal, round, and reactive to light  Neck:      Musculoskeletal: Neck supple  Thyroid: No thyromegaly  Cardiovascular:      Rate and Rhythm: Normal rate and regular rhythm  Pulses:           Radial pulses are 2+ on the right side and 2+ on the left side  Heart sounds: No murmur  Pulmonary:      Effort: Pulmonary effort is normal  No respiratory distress  Breath sounds: Normal breath sounds  No wheezing  Musculoskeletal:      Comments: Negative Chovstek sign   Skin:     General: Skin is warm and dry  Neurological:      Mental Status: She is alert  Deep Tendon Reflexes: Reflexes are normal and symmetric  Patient's shoes and socks were not removed  Labs:   Component      Latest Ref Rng & Units 11/15/2019 6/8/2020 2/9/2021   Sodium      136 - 145 mmol/L 142 138 141   Potassium      3 5 - 5 3 mmol/L 3 6 4 2 3 9   Chloride      100 - 108 mmol/L 103 103 105   CO2      21 - 32 mmol/L 27 29 24   Anion Gap      4 - 13 mmol/L 12 6 12   BUN      5 - 25 mg/dL 14 18 14   Creatinine      0 60 - 1 30 mg/dL 0 87 1 04 0 87   GLUCOSE FASTING      65 - 99 mg/dL 103 (H) 82 115 (H)   Calcium      8 3 - 10 1 mg/dL 7 1 (L) 7 2 (L) 7 1 (L)   eGFR      ml/min/1 73sq m 84 67 83   Albumin      3 5 - 5 0 g/dL 3 6 3 9 3 8   Vit D, 25-Hydroxy      30 0 - 100 0 ng/mL 26 9 (L) 21 2 (L) 23 2 (L)   Free T4      0 76 - 1 46 ng/dL 1 07 0 46 (L) 1 09   TSH 3RD GENERATON      0 358 - 3 740 uIU/mL 0 349 (L) 75 208 (H) 0 822       Plan:     Diagnoses and all orders for this visit:    Postoperative hypothyroidism  TFTs normal, TSH 0 822 and free T4 1 09  Continue current dose of Synthroid   Repeat TFTs prior to next visit  -     T4, free; Future  -     TSH, 3rd generation;  Future    Hypocalcemia  Corrected calcium is low at 7 3  History of low PTH after thyroid surgery  Vitamin D remains low  Patient is noncompliant with treatment  Discussed importance of taking medications as instructed, Rocaltrol 0 25 mcg BID, vitamin D 4000 IU daily, and calcium 1200 mg BID  -     Basic metabolic panel; Future  -     Vitamin D 25 hydroxy; Future  -     Albumin; Future  -     PTH, intact; Future  -     Basic metabolic panel; Future  -     Albumin; Future    Vitamin D deficiency  Vitamin D low at 23 2  Take vitamin D3 4000 IU daily  -     Vitamin D 25 hydroxy; Future      Discussed with the patient and all questions fully answered  She will call me if any problems arise      Counseled patient on diagnostic results, prognosis, risk and benefit of treatment options, instruction for management, importance of treatment compliance, risk  factor reduction and impressions      Kimberly Angel PA-C

## 2021-04-16 ENCOUNTER — IMMUNIZATIONS (OUTPATIENT)
Dept: FAMILY MEDICINE CLINIC | Facility: HOSPITAL | Age: 41
End: 2021-04-16

## 2021-04-16 DIAGNOSIS — Z23 ENCOUNTER FOR IMMUNIZATION: Primary | ICD-10-CM

## 2021-04-16 PROCEDURE — 0001A SARS-COV-2 / COVID-19 MRNA VACCINE (PFIZER-BIONTECH) 30 MCG: CPT

## 2021-04-16 PROCEDURE — 91300 SARS-COV-2 / COVID-19 MRNA VACCINE (PFIZER-BIONTECH) 30 MCG: CPT

## 2021-05-07 ENCOUNTER — IMMUNIZATIONS (OUTPATIENT)
Dept: FAMILY MEDICINE CLINIC | Facility: HOSPITAL | Age: 41
End: 2021-05-07

## 2021-05-07 DIAGNOSIS — Z23 ENCOUNTER FOR IMMUNIZATION: Primary | ICD-10-CM

## 2021-05-07 PROCEDURE — 0002A SARS-COV-2 / COVID-19 MRNA VACCINE (PFIZER-BIONTECH) 30 MCG: CPT

## 2021-05-07 PROCEDURE — 91300 SARS-COV-2 / COVID-19 MRNA VACCINE (PFIZER-BIONTECH) 30 MCG: CPT

## 2021-07-19 ENCOUNTER — HOSPITAL ENCOUNTER (EMERGENCY)
Facility: HOSPITAL | Age: 41
Discharge: HOME | End: 2021-07-19
Attending: EMERGENCY MEDICINE
Payer: COMMERCIAL

## 2021-07-19 VITALS
SYSTOLIC BLOOD PRESSURE: 98 MMHG | DIASTOLIC BLOOD PRESSURE: 80 MMHG | RESPIRATION RATE: 16 BRPM | OXYGEN SATURATION: 97 % | TEMPERATURE: 98.3 F | HEART RATE: 93 BPM

## 2021-07-19 DIAGNOSIS — L50.9 URTICARIA: Primary | ICD-10-CM

## 2021-07-19 PROCEDURE — 99284 EMERGENCY DEPT VISIT MOD MDM: CPT | Performed by: PHYSICIAN ASSISTANT

## 2021-07-19 PROCEDURE — 99283 EMERGENCY DEPT VISIT LOW MDM: CPT

## 2021-07-19 RX ORDER — FAMOTIDINE 20 MG/1
20 TABLET, FILM COATED ORAL ONCE
Status: COMPLETED | OUTPATIENT
Start: 2021-07-19 | End: 2021-07-19

## 2021-07-19 RX ORDER — PREDNISONE 20 MG/1
60 TABLET ORAL ONCE
Status: COMPLETED | OUTPATIENT
Start: 2021-07-19 | End: 2021-07-19

## 2021-07-19 RX ORDER — PREDNISONE 50 MG/1
50 TABLET ORAL DAILY
Qty: 5 TABLET | Refills: 0 | Status: SHIPPED | OUTPATIENT
Start: 2021-07-19 | End: 2021-07-24

## 2021-07-19 RX ORDER — HYDROXYZINE 50 MG/1
50 TABLET, FILM COATED ORAL EVERY 6 HOURS PRN
Qty: 15 TABLET | Refills: 0 | Status: SHIPPED | OUTPATIENT
Start: 2021-07-19

## 2021-07-19 RX ADMIN — FAMOTIDINE 20 MG: 20 TABLET, FILM COATED ORAL at 13:10

## 2021-07-19 RX ADMIN — PREDNISONE 60 MG: 20 TABLET ORAL at 13:10

## 2021-07-19 NOTE — ED PROVIDER NOTES
History  Chief Complaint   Patient presents with    Rash     pt c/o rash that started at 0130 this AM  rash has spread from arms and legs to buttocks and now to face  pt states she itches everywhere and has taken benedryl x2 for symptoms with no relief     Pt with Past Medical History: Hypothyroidism,   Past Surgical History: PARATHYROIDECTOMY/TOTAL THYROIDECTOMY   Presents to emergency department complaining of persistent, diffuse, pruritic, hive rash since last night, for which she took 3 doses of Benadryl pta, some improvement of itching but rasp persisting it may be getting slightly worse  No known allergens or cause  PT was out at a [facility], in the sun yesterday prior to rash  No fever, no shortness of breath, no NV          Prior to Admission Medications   Prescriptions Last Dose Informant Patient Reported? Taking? Cholecalciferol (VITAMIN D3) 50 MCG (2000 UT) capsule 7/19/2021 at Unknown time  No Yes   Sig: Take 2 capsules (4,000 Units total) by mouth daily   SYNTHROID 150 MCG tablet 7/19/2021 at Unknown time  No Yes   Sig: Take 1 tablet daily Monday through Saturday and 1/2 tablet on Sunday   (BRAND NECESSARY)   calcitriol (ROCALTROL) 0 25 mcg capsule 7/18/2021 at Unknown time  No Yes   Sig: Take 1 capsule (0 25 mcg total) by mouth 2 (two) times a day   calcium carbonate (OS-DENNIS) 600 MG tablet 7/19/2021 at Unknown time  No Yes   Sig: Take 2 tablets (1,200 mg total) by mouth 2 (two) times a day with meals      Facility-Administered Medications: None       Past Medical History:   Diagnosis Date    H/O total thyroidectomy 10/2016    History of par[facility]roidectomy ([facility] [address] ) 10/2015       Past Surgical History:   Procedure Laterality Date    PARATHYROIDECTOMY  10/2015    TOTAL THYROIDECTOMY  10/2015       Family History   Problem Relation Age of Onset    Hypothyroidism Mother     Hypothyroidism Maternal Grandmother     Diabetes type II Maternal Grandmother      I have reviewed and agree with the history as documented  E-Cigarette/Vaping     E-Cigarette/Vaping Substances     Social History     Tobacco Use    Smoking status: Current Every Day Smoker     Packs/day: 0 25    Smokeless tobacco: Never Used   Substance Use Topics    Alcohol use: Never    Drug use: Yes     Types: Marijuana     Comment: occasionally       Review of Systems   Constitutional: Negative for chills and fever  HENT: Negative for hearing loss, sore throat and trouble swallowing  Eyes: Negative for visual disturbance  Respiratory: Negative for cough, shortness of breath and wheezing  Cardiovascular: Negative for chest pain and leg swelling  Gastrointestinal: Negative for abdominal pain, nausea and vomiting  Genitourinary: Negative for dysuria and frequency  Musculoskeletal: Negative for arthralgias and myalgias  Skin: Positive for rash  Negative for color change and pallor  Neurological: Negative for dizziness, weakness and headaches  Psychiatric/Behavioral: Negative for behavioral problems  All other systems reviewed and are negative  Physical Exam  Physical Exam  Vitals and nursing note reviewed  Constitutional:       General: She is in acute distress  Appearance: She is well-developed  HENT:      Head: Normocephalic and atraumatic  Right Ear: External ear normal       Left Ear: External ear normal       Nose: Nose normal  No congestion or rhinorrhea  Mouth/Throat:      Mouth: Mucous membranes are moist       Pharynx: Oropharynx is clear  Uvula midline  No pharyngeal swelling, oropharyngeal exudate, posterior oropharyngeal erythema or uvula swelling  Eyes:      Conjunctiva/sclera: Conjunctivae normal    Cardiovascular:      Rate and Rhythm: Normal rate and regular rhythm  Pulmonary:      Effort: Pulmonary effort is normal  No respiratory distress  Breath sounds: Normal breath sounds  No wheezing  Abdominal:      General: Bowel sounds are normal       Palpations: Abdomen is soft  Musculoskeletal:         General: Normal range of motion  Cervical back: Normal range of motion  Skin:     General: Skin is warm and dry  Capillary Refill: Capillary refill takes less than 2 seconds  Findings: Rash (scattered, generalized urticaria/ hives,) present  Neurological:      General: No focal deficit present  Mental Status: She is alert and oriented to person, place, and time  Motor: No weakness  Psychiatric:         Behavior: Behavior normal          Vital Signs  ED Triage Vitals [07/19/21 1251]   Temperature Pulse Respirations Blood Pressure SpO2   98 3 °F (36 8 °C) 93 16 98/80 97 %      Temp Source Heart Rate Source Patient Position - Orthostatic VS BP Location FiO2 (%)   Oral Monitor -- Left arm --      Pain Score       --           Vitals:    07/19/21 1251   BP: 98/80   Pulse: 93         Visual Acuity      ED Medications  Medications   predniSONE tablet 60 mg (60 mg Oral Given 7/19/21 1310)   famotidine (PEPCID) tablet 20 mg (20 mg Oral Given 7/19/21 1310)       Diagnostic Studies  Results Reviewed     None                 No orders to display              Procedures  Procedures         ED Course                             SBIRT 20yo+      Most Recent Value   SBIRT (24 yo +)   In order to provide better care to our patients, we are screening all of our patients for alcohol and drug use  Would it be okay to ask you these screening questions? Yes Filed at: 07/19/2021 1255   Initial Alcohol Screen: US AUDIT-C    1  How often do you have a drink containing alcohol?  0 Filed at: 07/19/2021 1255   2  How many drinks containing alcohol do you have on a typical day you are drinking? 0 Filed at: 07/19/2021 1255   3b  FEMALE Any Age, or MALE 65+: How often do you have 4 or more drinks on one occassion? 0 Filed at: 07/19/2021 1255   Audit-C Score  0 Filed at: 07/19/2021 1255   MIRIAM: How many times in the past year have you       Used an illegal drug or used a prescription medication for non-medical reasons? Never Filed at: 07/19/2021 1255                    MDM    Disposition  Final diagnoses:   Urticaria     Time reflects when diagnosis was documented in both MDM as applicable and the Disposition within this note     Time User Action Codes Description Comment    7/19/2021  1:21 PM Jovany Anderson Add [L50 9] Urticaria       ED Disposition     ED Disposition Condition Date/Time Comment    Discharge Stable Mon Jul 19, 2021  1:21 PM Axel Grace discharge to home/self care  Follow-up Information     Follow up With Specialties Details Why Contact Info    Kaitlynn Li MD Family Medicine  As needed [id number]  [address] [address]  [place] [address]  [phone]            Discharge Medication List as of 7/19/2021  1:24 PM      START taking these medications    Details   hydrOXYzine HCL (ATARAX) 50 mg tablet Take 1 tablet (50 mg total) by mouth every 6 (six) hours as needed for itching, Starting Mon 7/19/2021, Normal      predniSONE 50 mg tablet Take 1 tablet (50 mg total) by mouth daily for 5 days, Starting Mon 7/19/2021, Until Sat 7/24/2021, Normal         CONTINUE these medications which have NOT CHANGED    Details   calcitriol (ROCALTROL) 0 25 mcg capsule Take 1 capsule (0 25 mcg total) by mouth 2 (two) times a day, Starting Wed 6/10/2020, Normal      calcium carbonate (OS-DENNIS) 600 MG tablet Take 2 tablets (1,200 mg total) by mouth 2 (two) times a day with meals, Starting Thu 2/11/2021, No Print      Cholecalciferol (VITAMIN D3) 50 MCG (2000 UT) capsule Take 2 capsules (4,000 Units total) by mouth daily, Starting Mon 12/2/2019, No Print      SYNTHROID 150 MCG tablet Take 1 tablet daily Monday through Saturday and 1/2 tablet on Sunday  (BRAND NECESSARY), Normal           No discharge procedures on file      PDMP Review     None          ED Provider  Electronically Signed by           Julia Mcgovern PA-C  07/19/21 4585

## 2021-07-19 NOTE — DISCHARGE INSTRUCTIONS
Use Benadryl 50 mg every 6 hours OR Atarax 50 mg every 6 hours as directed for itching  If no improvement you could add Pepcid 20 mg daily for itching  Take all oral steroids until done  Keep skin clean and cool  Follow-up with your doctor in next few days if no improvement

## 2021-10-30 ENCOUNTER — LAB (OUTPATIENT)
Dept: LAB | Facility: CLINIC | Age: 41
End: 2021-10-30
Payer: COMMERCIAL

## 2021-10-30 DIAGNOSIS — E89.0 POSTOPERATIVE HYPOTHYROIDISM: ICD-10-CM

## 2021-10-30 DIAGNOSIS — E83.51 HYPOCALCEMIA: ICD-10-CM

## 2021-10-30 DIAGNOSIS — E55.9 VITAMIN D DEFICIENCY: ICD-10-CM

## 2021-10-30 LAB
25(OH)D3 SERPL-MCNC: 26.6 NG/ML (ref 30–100)
ALBUMIN SERPL BCP-MCNC: 4.1 G/DL (ref 3.5–5)
ANION GAP SERPL CALCULATED.3IONS-SCNC: 9 MMOL/L (ref 4–13)
BUN SERPL-MCNC: 15 MG/DL (ref 5–25)
CALCIUM SERPL-MCNC: 7.9 MG/DL (ref 8.3–10.1)
CHLORIDE SERPL-SCNC: 105 MMOL/L (ref 100–108)
CO2 SERPL-SCNC: 28 MMOL/L (ref 21–32)
CREAT SERPL-MCNC: 1.08 MG/DL (ref 0.6–1.3)
GFR SERPL CREATININE-BSD FRML MDRD: 63 ML/MIN/1.73SQ M
GLUCOSE P FAST SERPL-MCNC: 93 MG/DL (ref 65–99)
POTASSIUM SERPL-SCNC: 4.4 MMOL/L (ref 3.5–5.3)
PTH-INTACT SERPL-MCNC: <6.3 PG/ML (ref 18.4–80.1)
SODIUM SERPL-SCNC: 142 MMOL/L (ref 136–145)
T4 FREE SERPL-MCNC: 0.9 NG/DL (ref 0.76–1.46)
TSH SERPL DL<=0.05 MIU/L-ACNC: 17.41 UIU/ML (ref 0.36–3.74)

## 2021-10-30 PROCEDURE — 84443 ASSAY THYROID STIM HORMONE: CPT

## 2021-10-30 PROCEDURE — 84439 ASSAY OF FREE THYROXINE: CPT

## 2021-10-30 PROCEDURE — 80048 BASIC METABOLIC PNL TOTAL CA: CPT

## 2021-10-30 PROCEDURE — 82040 ASSAY OF SERUM ALBUMIN: CPT

## 2021-10-30 PROCEDURE — 82306 VITAMIN D 25 HYDROXY: CPT

## 2021-10-30 PROCEDURE — 83970 ASSAY OF PARATHORMONE: CPT

## 2021-10-30 PROCEDURE — 36415 COLL VENOUS BLD VENIPUNCTURE: CPT

## 2021-11-03 ENCOUNTER — OFFICE VISIT (OUTPATIENT)
Dept: ENDOCRINOLOGY | Facility: CLINIC | Age: 41
End: 2021-11-03
Payer: COMMERCIAL

## 2021-11-03 VITALS
BODY MASS INDEX: 38.8 KG/M2 | DIASTOLIC BLOOD PRESSURE: 88 MMHG | WEIGHT: 256 LBS | HEIGHT: 68 IN | HEART RATE: 84 BPM | TEMPERATURE: 98.7 F | SYSTOLIC BLOOD PRESSURE: 138 MMHG

## 2021-11-03 DIAGNOSIS — E66.9 OBESITY (BMI 30-39.9): ICD-10-CM

## 2021-11-03 DIAGNOSIS — E89.0 POSTOPERATIVE HYPOTHYROIDISM: ICD-10-CM

## 2021-11-03 DIAGNOSIS — E89.2 HISTORY OF PARATHYROIDECTOMY (HCC): ICD-10-CM

## 2021-11-03 DIAGNOSIS — E55.9 VITAMIN D DEFICIENCY: ICD-10-CM

## 2021-11-03 DIAGNOSIS — E89.0 POSTOPERATIVE HYPOTHYROIDISM: Primary | ICD-10-CM

## 2021-11-03 DIAGNOSIS — E83.51 HYPOCALCEMIA: ICD-10-CM

## 2021-11-03 PROCEDURE — 99214 OFFICE O/P EST MOD 30 MIN: CPT | Performed by: PHYSICIAN ASSISTANT

## 2021-11-03 RX ORDER — LEVOTHYROXINE SODIUM 150 UG/1
TABLET ORAL
Qty: 90 TABLET | Refills: 1 | Status: CANCELLED | OUTPATIENT
Start: 2021-11-03

## 2021-11-03 RX ORDER — LEVOTHYROXINE SODIUM 150 UG/1
TABLET ORAL
Qty: 96 TABLET | Refills: 0 | Status: SHIPPED | OUTPATIENT
Start: 2021-11-03 | End: 2022-03-16 | Stop reason: SDUPTHER

## 2021-11-03 RX ORDER — ACETAMINOPHEN 160 MG
6000 TABLET,DISINTEGRATING ORAL DAILY
Start: 2021-11-03 | End: 2022-03-16 | Stop reason: SDUPTHER

## 2021-11-03 RX ORDER — CALCITRIOL 0.25 UG/1
0.25 CAPSULE, LIQUID FILLED ORAL 2 TIMES DAILY
Qty: 180 CAPSULE | Refills: 1 | Status: SHIPPED | OUTPATIENT
Start: 2021-11-03 | End: 2022-03-16 | Stop reason: SDUPTHER

## 2021-11-10 ENCOUNTER — HOSPITAL ENCOUNTER (EMERGENCY)
Facility: HOSPITAL | Age: 41
Discharge: HOME | End: 2021-11-10
Attending: EMERGENCY MEDICINE | Admitting: EMERGENCY MEDICINE
Payer: COMMERCIAL

## 2021-11-10 VITALS
WEIGHT: 251 LBS | DIASTOLIC BLOOD PRESSURE: 69 MMHG | OXYGEN SATURATION: 95 % | BODY MASS INDEX: 38.04 KG/M2 | HEIGHT: 68 IN | TEMPERATURE: 98.2 F | HEART RATE: 90 BPM | SYSTOLIC BLOOD PRESSURE: 117 MMHG | RESPIRATION RATE: 18 BRPM

## 2021-11-10 DIAGNOSIS — U07.1 COVID-19 VIRUS INFECTION: Primary | ICD-10-CM

## 2021-11-10 LAB
ALBUMIN SERPL BCP-MCNC: 4.3 G/DL (ref 3.5–5)
ALP SERPL-CCNC: 79 U/L (ref 46–116)
ALT SERPL W P-5'-P-CCNC: 52 U/L (ref 12–78)
ANION GAP SERPL CALCULATED.3IONS-SCNC: 11 MMOL/L (ref 4–13)
AST SERPL W P-5'-P-CCNC: 30 U/L (ref 5–45)
BASOPHILS # BLD AUTO: 0.05 THOUSANDS/ΜL (ref 0–0.1)
BASOPHILS NFR BLD AUTO: 1 % (ref 0–1)
BILIRUB SERPL-MCNC: 0.46 MG/DL (ref 0.2–1)
BUN SERPL-MCNC: 13 MG/DL (ref 5–25)
CALCIUM SERPL-MCNC: 8.2 MG/DL (ref 8.3–10.1)
CARDIAC TROPONIN I PNL SERPL HS: <2 NG/L
CHLORIDE SERPL-SCNC: 99 MMOL/L (ref 100–108)
CO2 SERPL-SCNC: 27 MMOL/L (ref 21–32)
CREAT SERPL-MCNC: 1.12 MG/DL (ref 0.6–1.3)
EOSINOPHIL # BLD AUTO: 0.1 THOUSAND/ΜL (ref 0–0.61)
EOSINOPHIL NFR BLD AUTO: 1 % (ref 0–6)
ERYTHROCYTE [DISTWIDTH] IN BLOOD BY AUTOMATED COUNT: 12.6 % (ref 11.6–15.1)
EXT PREG TEST URINE: NEGATIVE
EXT. CONTROL ED NAV: NORMAL
FLUAV RNA RESP QL NAA+PROBE: NEGATIVE
FLUBV RNA RESP QL NAA+PROBE: NEGATIVE
GFR SERPL CREATININE-BSD FRML MDRD: 61 ML/MIN/1.73SQ M
GLUCOSE SERPL-MCNC: 112 MG/DL (ref 65–140)
HCT VFR BLD AUTO: 45 % (ref 34.8–46.1)
HGB BLD-MCNC: 15.2 G/DL (ref 11.5–15.4)
IMM GRANULOCYTES # BLD AUTO: 0.09 THOUSAND/UL (ref 0–0.2)
IMM GRANULOCYTES NFR BLD AUTO: 1 % (ref 0–2)
LYMPHOCYTES # BLD AUTO: 1.72 THOUSANDS/ΜL (ref 0.6–4.47)
LYMPHOCYTES NFR BLD AUTO: 23 % (ref 14–44)
MAGNESIUM SERPL-MCNC: 2 MG/DL (ref 1.6–2.6)
MCH RBC QN AUTO: 31.5 PG (ref 26.8–34.3)
MCHC RBC AUTO-ENTMCNC: 33.8 G/DL (ref 31.4–37.4)
MCV RBC AUTO: 93 FL (ref 82–98)
MONOCYTES # BLD AUTO: 0.97 THOUSAND/ΜL (ref 0.17–1.22)
MONOCYTES NFR BLD AUTO: 13 % (ref 4–12)
NEUTROPHILS # BLD AUTO: 4.61 THOUSANDS/ΜL (ref 1.85–7.62)
NEUTS SEG NFR BLD AUTO: 61 % (ref 43–75)
NRBC BLD AUTO-RTO: 0 /100 WBCS
PLATELET # BLD AUTO: 189 THOUSANDS/UL (ref 149–390)
PMV BLD AUTO: 10.6 FL (ref 8.9–12.7)
POTASSIUM SERPL-SCNC: 3.7 MMOL/L (ref 3.5–5.3)
PROT SERPL-MCNC: 8.4 G/DL (ref 6.4–8.2)
RBC # BLD AUTO: 4.82 MILLION/UL (ref 3.81–5.12)
RSV RNA RESP QL NAA+PROBE: NEGATIVE
SARS-COV-2 RNA RESP QL NAA+PROBE: POSITIVE
SODIUM SERPL-SCNC: 137 MMOL/L (ref 136–145)
WBC # BLD AUTO: 7.54 THOUSAND/UL (ref 4.31–10.16)

## 2021-11-10 PROCEDURE — 99284 EMERGENCY DEPT VISIT MOD MDM: CPT

## 2021-11-10 PROCEDURE — 36415 COLL VENOUS BLD VENIPUNCTURE: CPT

## 2021-11-10 PROCEDURE — 84484 ASSAY OF TROPONIN QUANT: CPT | Performed by: EMERGENCY MEDICINE

## 2021-11-10 PROCEDURE — 85025 COMPLETE CBC W/AUTO DIFF WBC: CPT | Performed by: EMERGENCY MEDICINE

## 2021-11-10 PROCEDURE — 0241U HB NFCT DS VIR RESP RNA 4 TRGT: CPT | Performed by: EMERGENCY MEDICINE

## 2021-11-10 PROCEDURE — 81025 URINE PREGNANCY TEST: CPT | Performed by: EMERGENCY MEDICINE

## 2021-11-10 PROCEDURE — 80053 COMPREHEN METABOLIC PANEL: CPT | Performed by: EMERGENCY MEDICINE

## 2021-11-10 PROCEDURE — 93005 ELECTROCARDIOGRAM TRACING: CPT

## 2021-11-10 PROCEDURE — 83735 ASSAY OF MAGNESIUM: CPT | Performed by: EMERGENCY MEDICINE

## 2021-11-10 PROCEDURE — 99285 EMERGENCY DEPT VISIT HI MDM: CPT | Performed by: EMERGENCY MEDICINE

## 2021-11-10 RX ORDER — POTASSIUM CHLORIDE 20 MEQ/1
40 TABLET, EXTENDED RELEASE ORAL ONCE
Status: COMPLETED | OUTPATIENT
Start: 2021-11-10 | End: 2021-11-10

## 2021-11-10 RX ADMIN — POTASSIUM CHLORIDE 40 MEQ: 1500 TABLET, EXTENDED RELEASE ORAL at 21:45

## 2021-11-11 LAB
ATRIAL RATE: 93 BPM
P AXIS: 58 DEGREES
PR INTERVAL: 118 MS
QRS AXIS: 52 DEGREES
QRSD INTERVAL: 80 MS
QT INTERVAL: 372 MS
QTC INTERVAL: 462 MS
T WAVE AXIS: 40 DEGREES
VENTRICULAR RATE: 93 BPM

## 2021-11-11 PROCEDURE — 93010 ELECTROCARDIOGRAM REPORT: CPT | Performed by: INTERNAL MEDICINE

## 2022-02-15 ENCOUNTER — OFFICE VISIT (OUTPATIENT)
Dept: OBGYN CLINIC | Facility: CLINIC | Age: 42
End: 2022-02-15

## 2022-02-15 VITALS
HEIGHT: 68 IN | DIASTOLIC BLOOD PRESSURE: 82 MMHG | BODY MASS INDEX: 37.28 KG/M2 | WEIGHT: 246 LBS | SYSTOLIC BLOOD PRESSURE: 125 MMHG | HEART RATE: 82 BPM

## 2022-02-15 DIAGNOSIS — B37.3 VAGINAL YEAST INFECTION: Primary | ICD-10-CM

## 2022-02-15 DIAGNOSIS — Z72.51 HIGH RISK SEXUAL BEHAVIOR, UNSPECIFIED TYPE: ICD-10-CM

## 2022-02-15 DIAGNOSIS — N89.8 VAGINAL DISCHARGE: ICD-10-CM

## 2022-02-15 PROBLEM — B37.31 VAGINAL YEAST INFECTION: Status: ACTIVE | Noted: 2022-02-15

## 2022-02-15 LAB
BV WHIFF TEST VAG QL: NEGATIVE
CLUE CELLS SPEC QL WET PREP: NEGATIVE
PH SMN: 4 [PH]
SL AMB POCT WET MOUNT: POSITIVE
T VAGINALIS VAG QL WET PREP: NEGATIVE
YEAST VAG QL WET PREP: POSITIVE

## 2022-02-15 PROCEDURE — 99203 OFFICE O/P NEW LOW 30 MIN: CPT | Performed by: NURSE PRACTITIONER

## 2022-02-15 PROCEDURE — 87210 SMEAR WET MOUNT SALINE/INK: CPT | Performed by: NURSE PRACTITIONER

## 2022-02-15 PROCEDURE — 87491 CHLMYD TRACH DNA AMP PROBE: CPT | Performed by: NURSE PRACTITIONER

## 2022-02-15 PROCEDURE — 87591 N.GONORRHOEAE DNA AMP PROB: CPT | Performed by: NURSE PRACTITIONER

## 2022-02-15 NOTE — PATIENT INSTRUCTIONS
HPV (Human Papillomavirus)   WHAT YOU NEED TO KNOW:   What is human papillomavirus (HPV)? HPV is the most common infection spread by sexual contact  It can also be spread from a mother to her baby during delivery  HPV may cause oral and genital warts or tumors in your nose, mouth, throat, and lungs  HPV may also cause vaginal, penile, and anal cancers  You may not show symptoms of any of these conditions for several years after being exposed to HPV  What are the symptoms of HPV? · Painless warts    · Genital or anal discharge, bleeding, itching, or pain    · Pain when you urinate    How is HPV diagnosed? Your healthcare provider may use a vinegar liquid to help diagnose HPV genital warts  Women 27to 72years old can be checked for HPV during regular cervical cancer screenings  An HPV test checks for certain types of HPV that can cause changes in cervical cells  Without treatment, the changed cells can become cancer  An HPV test can be done every 5 years if the results show no infection  The test can be done with or without a Pap smear  A Pap smear checks for cancer or for abnormal cells that can become cancer  You may be tested for HPV if you are diagnosed with a mouth or throat cancer  How is HPV treated? HPV cannot be cured  Conditions that are caused by HPV can be treated  You will need to be monitored closely for these conditions  Ask your healthcare provider for more information about monitoring, conditions caused by HPV, and available treatments  How can HPV infection be prevented? · Ask about the HPV vaccine  The vaccine can help protect against HPV infection  Females and males can receive the vaccine  It is most effective if given before sexual activity begins  This allows the body to build almost complete protection against HPV before contact with the virus  The vaccine is usually given at 6or 15years of age but may be given as early as 5 years   The vaccine can be given through age 45     · Always use a condom during intercourse  A condom will not completely protect you from HPV infection, but it will help lower your risk  Use a new condom or latex barrier each time you have sex  This includes oral, vaginal, and anal sex  Make sure the condom fits and is put on correctly  Rubber latex sheets or dental dams can be used for oral sex  If you are allergic to latex, use a nonlatex product such as polyurethane  When should I call my doctor? · You have warts in your genital or anal area  · You have genital or anal discharge, bleeding, itching, or pain  · You have pain when you urinate  · You have questions or concerns about your condition or care  CARE AGREEMENT:   You have the right to help plan your care  Learn about your health condition and how it may be treated  Discuss treatment options with your healthcare providers to decide what care you want to receive  You always have the right to refuse treatment  The above information is an  only  It is not intended as medical advice for individual conditions or treatments  Talk to your doctor, nurse or pharmacist before following any medical regimen to see if it is safe and effective for you  © Copyright [employer] 2021 Information is for End User's use only and may not be sold, redistributed or otherwise used for commercial purposes  All illustrations and images included in CareNotes® are the copyrighted property of [employer]  or Jadyn Wilson  Yeast Infection   AMBULATORY CARE:   A yeast infection , or vaginal candidiasis, is a common vaginal infection  A yeast infection is caused by a fungus, or yeast-like germ  Fungi are normally found in your vagina  Too many fungi can cause an infection  Common signs and symptoms:    Thick, white, cheese-like discharge from your vagina    Itching, swelling, and redness in your vagina    Pain or burning when you urinate    Pain during sexual intercourse    Call your doctor or gynecologist if:   You have a fever and chills  You develop abdominal or pelvic pain  Your discharge is bloody and it is not your monthly period  Your signs and symptoms get worse, even after treatment  You have questions or concerns about your condition or care  Treatment for a yeast infection  includes medicines to treat the fungal infection and decrease inflammation  The medicine may be a pill, cream, ointment, or vaginal tablet or suppository  Keep your vagina healthy:   Clean your genital area with mild soap and warm water each day  Do not get soap inside your vagina  Gently dry the area after washing  Do not use hot tubs  The heat and moisture from hot tubs can increase your risk for another yeast infection  Always wipe from front to back  after you use the toilet  This prevents spreading bacteria from your rectal area into your vagina  Do not wear tight-fitting clothes or undergarments  for long periods of time  Wear cotton underwear during the day  Cotton helps keep your genital area dry and does not hold in warmth or moisture  Do not wear underwear at night  Do not douche  or use feminine hygiene sprays or bubble bath  Do not use pads or tampons that are scented, or colored or perfumed toilet paper  Do not have sex until your symptoms go away  Have your partner wear a condom until you complete your course of medication  Ask your healthcare provider about birth control options if necessary  Condoms have latex and diaphragms have gel that kills sperm  Both of these may irritate your genital area  Follow up with your doctor or gynecologist as directed:  Write down your questions so you remember to ask them during your visits  © Copyright [employer] 2021 Information is for End User's use only and may not be sold, redistributed or otherwise used for commercial purposes   All illustrations and images included in CareNotes® are the copyrighted property of A  [employer] Inc  or [facility]  The above information is an  only  It is not intended as medical advice for individual conditions or treatments  Talk to your doctor, nurse or pharmacist before following any medical regimen to see if it is safe and effective for you  Attending Only

## 2022-02-15 NOTE — PROGRESS NOTES
ASSESSMENT & PLAN: Colt Esteban is a 42 y o  No obstetric history on file  with {NORMAL/ABNORMAL PKXB:60777} gynecologic exam     1   Routine well woman exam done today  2  Pap and HPV:  The patient's last pap and hpv was 10/08/2019  It was normal     Pap and cotesting was not done today  Current ASCCP Guidelines reviewed  Next due 10/08/2024  3  Mammogram ordered  4  The following were reviewed in today's visit: {Gyn counselin}  5  Gardisil vaccine in women up to age 39 discussed and information was provided  BMI Counseling: There is no height or weight on file to calculate BMI  The BMI {VB BMI Counselin}      Depression Screening Follow-up Plan: Patient's depression screening was positive with a PHQ-2 score of   Their PHQ-9 score was   {Depression screen follow-up plan:9426729007}  CC:  Annual Gynecologic Examination    HPI: Colt Esteban is a 42 y o  No obstetric history on file  who presents for annual gynecologic examination  She is new patient to us  Her last Pap and HPV was done 10/08/2019 and was negative, found in care everywhere  Her last mammogram was***   She has the following concerns:  ***    Health Maintenance:    She wears her seatbelt routinely  She {DOES/DOES IMD:28753} perform {Desc; regular/irre} monthly self breast exams  She feels safe at home  Patient Active Problem List   Diagnosis    Hypocalcemia    Postoperative hypothyroidism    Vitamin D deficiency    [facility]    Obesity (BMI 30-39  9)       Past Medical History:   Diagnosis Date    H/O total thyroidectomy 10/2016    History of [facility]roidectomy ([facility] [address] ) 10/2015       Past Surgical History:   Procedure Laterality Date    PARATHYROIDECTOMY  10/2015    TOTAL THYROIDECTOMY  10/2015       Past OB/Gyn History:  OB History    No obstetric history on file  Pt {HAS/DOES NOT UPXY:52649} menstrual issues  ***    History of sexually transmitted infection: {YES/NO:11562}  History of abnormal pap smears: {YES/NO:35900} ***  Patient {IS/ IS NOT:71963} currently sexually active  {Sexual social history md:83035}  The current method of family planning is {contraception:053523}  Family History   Problem Relation Age of Onset    Hypothyroidism Mother     Hypothyroidism Maternal Grandmother     Diabetes type II Maternal Grandmother        Social History:  Social History     Socioeconomic History    Marital status:      Spouse name: Not on file    Number of children: Not on file    Years of education: Not on file    Highest education level: Not on file   Occupational History    Not on file   Tobacco Use    Smoking status: Current Every Day Smoker     Packs/day: 0 25    Smokeless tobacco: Never Used    Tobacco comment: 3 cigarettes a day   Substance and Sexual Activity    Alcohol use: Never    Drug use: Yes     Types: Marijuana     Comment: occasionally    Sexual activity: Yes   Other Topics Concern    Not on file   Social History Narrative    Not on file     Social Determinants of Health     Financial Resource Strain: Not on file   Food Insecurity: Not on file   Transportation Needs: Not on file   Physical Activity: Not on file   Stress: Not on file   Social Connections: Not on file   Intimate Partner Violence: Not on file   Housing Stability: Not on file     Presently lives with ***  Patient is {Desc; marital status:62}    Patient is currently {[facility] Employment:27200} ***  Allergies   Allergen Reactions    Latex      Other reaction(s): swollen sore sensation       Current Outpatient Medications:     calcitriol (ROCALTROL) 0 25 mcg capsule, Take 1 capsule (0 25 mcg total) by mouth 2 (two) times a day, Disp: 180 capsule, Rfl: 1    calcium carbonate (OS-DENNIS) 600 MG tablet, Take 2 tablets (1,200 mg total) by mouth 2 (two) times a day with meals, Disp: , Rfl:     Cholecalciferol (Vitamin D3) 50 MCG (2000 UT) capsule, Take 3 capsules (6,000 Units total) by mouth daily, Disp: , Rfl:     hydrOXYzine HCL (ATARAX) 50 mg tablet, Take 1 tablet (50 mg total) by mouth every 6 (six) hours as needed for itching (Patient not taking: Reported on 11/3/2021), Disp: 15 tablet, Rfl: 0    Synthroid 150 MCG tablet, Take 1 tablet daily Mon-Sat and 1 5 tablets on Sunday  Brand necessary, Disp: 96 tablet, Rfl: 0    Review of Systems:    Review of Systems  Constitutional :no fever, feels well, no tiredness, no recent weight gain or loss  ENT: no ear ache, no loss of hearing, no nosebleeds or nasal discharge, no sore throat or hoarseness  Cardiovascular: no complaints of slow or fast heart beat, no chest pain, no palpitations, no leg claudication or lower extremity edema  Respiratory: no complaints of shortness of shortness of breath, no CHEUNG  Breasts:no complaints of breast pain, breast lump, or nipple discharge  Gastrointestinal: no complaints of abdominal pain, constipation, nausea, vomiting, or diarrhea or bloody stools  Genitourinary : no complaints of dysuria, incontinence, pelvic pain, no dysmenorrhea, vaginal discharge or abnormal vaginal bleeding and as noted in HPI  Musculoskeletal: no complaints of arthralgia, no myalgia, no joint swelling or stiffness, no limb pain or swelling  Integumentary: no complaints of skin rash or lesion, itching or dry skin  Neurological: no complaints of headache, no confusion, no numbness or tingling, no dizziness or fainting    Objective      There were no vitals taken for this visit      General:   appears stated age, cooperative, alert normal mood and affect   Neck: normal, supple,trachea midline, no masses   Heart: regular rate and rhythm, S1, S2 normal, no murmur, click, rub or gallop   Lungs: clear to auscultation bilaterally   Breasts: {EXAM; BREAST:94493}   Abdomen: soft, non-tender, without masses or organomegaly   Vulva: {EXAM; GYN NJELD:32644}   Vagina: {exam; vagina:26680}   Urethra: {Urethra:21979}   Cervix: {PE IFDFXM:42320::"ZNFNAL, no discharge "}   Uterus: {exam; uterus:82931}   Adnexa: {exam; adnexa:12620}   Lymphatic palpation of lymph nodes in neck, axilla, groin and/or other locations: no lymphadenopathy or masses noted   Skin normal skin turgor and no rashes     Psychiatric orientation to person, place, and time: normal  mood and affect: normal

## 2022-02-15 NOTE — PROGRESS NOTES
Assessment/Plan:    No problem-specific Assessment & Plan notes found for this encounter  Diagnoses and all orders for this visit:    Vaginal yeast infection  -     terconazole (TERAZOL 7) 0 4 % vaginal cream; Insert 1 applicator into the vagina daily at bedtime for 7 days   Discussed with patient yeast infection and treatment  Desires to use terconazole 1 applicator per vagina x7 nights  Use medication until completed  Reviewed prevention,    recommend to  bathe with mild soap such as Dove unscented,   avoid scented products, change clothing when moist,  avoid tight restrictive clothing  Review to call if symptoms not improved  Vaginal discharge  -     POCT wet mount-positive for yeast    High risk sexual behavior, unspecified type  -     Chlamydia/GC amplified DNA by PCR-call results patient          Subjective:      Patient ID: Anabelle Chakraborty is a 42 y o  female  HPI 25-year-old G0 new patient here with possible yeast infection  Pt has a white vaginal dsch  Denies vaginal fishy odor, but may have some odor  She has had yeast infections and BV in the past  She denies any vaginal itching  currently, did resolve with self treatment with ovules about 10 days ago  Does report increased stress due to life situation  Consents to testing for chlamydia and gonorrhea  Patient currently not sexually active reports,  her partner committed suicide recently  Reports she has a great support system with friends, family and coworkers  LMP 02/08/2022    The following portions of the patient's history were reviewed and updated as appropriate: allergies, current medications, past family history, past medical history, past social history, past surgical history and problem list     Review of Systems   Constitutional: Negative for chills and fever  Respiratory: Negative  Cardiovascular: Negative  Gastrointestinal: Negative for abdominal pain  Genitourinary: Positive for vaginal discharge   Negative for dysuria, frequency, genital sores, menstrual problem, pelvic pain and urgency  Objective:      /82 (BP Location: Left arm, Patient Position: Sitting, Cuff Size: Large)   Pulse 82   Ht 5' 8" (1 727 m)   Wt 112 kg (246 lb)   LMP 02/08/2022 (Approximate)   Breastfeeding No   BMI 37 40 kg/m²          Physical Exam  Constitutional:       Appearance: Normal appearance  Cardiovascular:      Rate and Rhythm: Normal rate  Pulmonary:      Effort: Pulmonary effort is normal    Abdominal:      Palpations: Abdomen is soft  Tenderness: There is no abdominal tenderness  Skin:     General: Skin is warm and dry  Neurological:      Mental Status: She is oriented to person, place, and time     Psychiatric:         Mood and Affect: Mood normal          Behavior: Behavior normal        external genitalia-no lesions, some very mild erythema in skin folds  Vagina-small amount white discharge  Cervix-negative CMT no lesions  Uterus-anteverted, nontender  -adnexa- no masses nontender    Wet mount KOH-positive for yeast

## 2022-02-16 LAB
C TRACH DNA SPEC QL NAA+PROBE: NEGATIVE
N GONORRHOEA DNA SPEC QL NAA+PROBE: NEGATIVE

## 2022-02-17 ENCOUNTER — TELEPHONE (OUTPATIENT)
Dept: OBGYN CLINIC | Facility: CLINIC | Age: 42
End: 2022-02-17

## 2022-02-17 NOTE — TELEPHONE ENCOUNTER
----- Message from Bulmaro Ruano sent at 2/16/2022  8:24 AM EST -----   Result is negative, please call patient to inform     Thanks

## 2022-03-07 ENCOUNTER — ANNUAL EXAM (OUTPATIENT)
Dept: OBGYN CLINIC | Facility: CLINIC | Age: 42
End: 2022-03-07

## 2022-03-07 VITALS
SYSTOLIC BLOOD PRESSURE: 121 MMHG | WEIGHT: 244.8 LBS | HEIGHT: 68 IN | BODY MASS INDEX: 37.1 KG/M2 | HEART RATE: 86 BPM | DIASTOLIC BLOOD PRESSURE: 81 MMHG

## 2022-03-07 DIAGNOSIS — Z12.31 ENCOUNTER FOR SCREENING MAMMOGRAM FOR MALIGNANT NEOPLASM OF BREAST: ICD-10-CM

## 2022-03-07 DIAGNOSIS — Z01.419 ENCOUNTER FOR GYNECOLOGICAL EXAMINATION WITHOUT ABNORMAL FINDING: Primary | ICD-10-CM

## 2022-03-07 PROCEDURE — G0476 HPV COMBO ASSAY CA SCREEN: HCPCS | Performed by: NURSE PRACTITIONER

## 2022-03-07 PROCEDURE — S0612 ANNUAL GYNECOLOGICAL EXAMINA: HCPCS | Performed by: NURSE PRACTITIONER

## 2022-03-07 PROCEDURE — G0145 SCR C/V CYTO,THINLAYER,RESCR: HCPCS | Performed by: NURSE PRACTITIONER

## 2022-03-07 NOTE — PROGRESS NOTES
ASSESSMENT & PLAN: Anabelle Chakraborty is a 42 y o   G0 obstetric history on file  with normal gynecologic exam     1   Routine well woman exam done today  2  Pap and HPV:  The patient's last pap and hpv was 2020  It was normal     Pap and cotesting was done today  Denies abnormal Pap history   Current ASCCP Guidelines reviewed  3   Bilateral screening 3D Mammogram ordered-patient to schedule  4  The following were reviewed in today's visit: breast self exam, mammography screening ordered, adequate intake of calcium and vitamin D, exercise, healthy diet and tobacco cessation  Return to office in 1 year  5  Gardisil vaccine in women up to age 39 discussed and information was provided  Patient to check with insurance for coverage  BMI Counseling: Body mass index is 37 22 kg/m²  The BMI is above normal  Nutrition recommendations include reducing portion sizes, 3-5 servings of fruits/vegetables daily, decreasing soda and/or juice intake and increasing intake of lean protein  Exercise recommendations include exercising 3-5 times per week  Tobacco Cessation Counseling: Tobacco cessation counseling was not provided  The patient is sincerely urged to quit consumption of tobacco  She is not ready to quit tobacco  The numerous health risks of tobacco consumption were discussed    Depression Screening Follow-up Plan: Patient's depression screening was positive with a PHQ-2 score of 0  Their PHQ-9 score was 1   Clinically patient does not have depression  No treatment is required  CC:  Annual Gynecologic Examination    HPI: Anabelle Chakraborty is a 42 y o   G0 obstetric history on file  who presents for annual gynecologic examination  She was treated for yeast on 02/15/2022, she had negative culture for chlamydia and gonorrhea  She reports her symptoms resolved  Her last Pap was in 2020  Patient denies any abnormal Pap history  Currently not sexually active    LMP was 02/22/2022-does report history of 1 abnormal menses due to increased stress  Health Maintenance:    She wears her seatbelt routinely  She does perform irregular monthly self breast exams  She feels safe at home  Patient Active Problem List   Diagnosis    Hypocalcemia    Postoperative hypothyroidism    Vitamin D deficiency    History of parathyroidectomy ([facility] [place] [facility] )    Obesity (BMI 30-39  9)    Vaginal yeast infection    Vaginal discharge    High risk sexual behavior       Past Medical History:   Diagnosis Date    H/O total thyroidectomy 10/2016    History of parathyroidectomy ([facility] [place] [facility] ) 10/2015       Past Surgical History:   Procedure Laterality Date    PARATHYROIDECTOMY  10/2015    TOTAL THYROIDECTOMY  10/2015       Past OB/Gyn History:  OB History        1    Para        Term                AB   1    Living           SAB   1    IAB        Ectopic        Multiple        Live Births                      Pt does not have menstrual issues  Monthly, last 7 days, has always been heavy  Heavy for 2 3 days, changes pads or tampons every 1 5 hours  History of anemia as child  History of sexually transmitted infection: Yes  History of Chlamydia as a teenager   History of abnormal pap smears: No      Patient is not currently sexually active  not sexually active  The current method of family planning is abstinence      Family History   Problem Relation Age of Onset    Hypothyroidism Mother     Hypothyroidism Maternal Grandmother     Diabetes type II Maternal Grandmother     Thyroid disease Sister     Thyroid disease Sister     Thyroid disease Sister     Colon cancer Neg Hx     Ovarian cancer Neg Hx        Social History:  Social History     Socioeconomic History    Marital status:      Spouse name: Not on file    Number of children: Not on file    Years of education: Not on file    Highest education level: Not on file   Occupational History    Not on file   Tobacco Use    Smoking status: Former Smoker     Packs/day: 0 25    Smokeless tobacco: Never Used    Tobacco comment: Quit in 2020   Vaping Use    Vaping Use: Every day    Substances: THC   Substance and Sexual Activity    Alcohol use: Never    Drug use: Yes     Types: Marijuana     Comment: occasionally    Sexual activity: Not Currently     Partners: Male   Other Topics Concern    Not on file   Social History Narrative    Not on file     Social Determinants of Health     Financial Resource Strain: Low Risk     Difficulty of Paying Living Expenses: Not hard at all   Food Insecurity: No Food Insecurity    Worried About Running Out of Food in the Last Year: Never true    Darleen of Food in the Last Year: Never true   Transportation Needs: No Transportation Needs    Lack of Transportation (Medical): No    Lack of Transportation (Non-Medical):  No   Physical Activity: Insufficiently Active    Days of Exercise per Week: 1 day    Minutes of Exercise per Session: 60 min   Stress: No Stress Concern Present    Feeling of Stress : Not at all   Social Connections: Socially Isolated    Frequency of Communication with Friends and Family: More than three times a week    Frequency of Social Gatherings with Friends and Family: More than three times a week    Attends Jew Services: Never    Active Member of Clubs or Organizations: No    Attends Club or Organization Meetings: Never    Marital Status:    Intimate Partner Violence: Not At Risk    Fear of Current or Ex-Partner: No    Emotionally Abused: No    Physically Abused: No    Sexually Abused: No   Housing Stability: Low Risk     Unable to Pay for Housing in the Last Year: No    Number of [place] in the Last Year: 1    Unstable Housing in the Last Year: No     Patient is currently employed   Allergies   Allergen Reactions    Latex      Other reaction(s): swollen sore sensation       Current Outpatient Medications:     calcium carbonate (OS-DENNIS) 600 MG tablet, Take 2 tablets (1,200 mg total) by mouth 2 (two) times a day with meals, Disp: , Rfl:     Cholecalciferol (Vitamin D3) 50 MCG (2000 UT) capsule, Take 3 capsules (6,000 Units total) by mouth daily, Disp: , Rfl:     Synthroid 150 MCG tablet, Take 1 tablet daily Mon-Sat and 1 5 tablets on Sunday  Brand necessary, Disp: 96 tablet, Rfl: 0    calcitriol (ROCALTROL) 0 25 mcg capsule, Take 1 capsule (0 25 mcg total) by mouth 2 (two) times a day (Patient not taking: Reported on 3/7/2022 ), Disp: 180 capsule, Rfl: 1    hydrOXYzine HCL (ATARAX) 50 mg tablet, Take 1 tablet (50 mg total) by mouth every 6 (six) hours as needed for itching (Patient not taking: Reported on 11/3/2021), Disp: 15 tablet, Rfl: 0    Review of Systems:    Review of Systems  Constitutional :no fever, feels well, no tiredness, no recent weight gain or loss  ENT: no ear ache, no loss of hearing, no nosebleeds or nasal discharge, no sore throat or hoarseness  Cardiovascular: no complaints of slow or fast heart beat, no chest pain, no palpitations, no leg claudication or lower extremity edema  Respiratory: no complaints of shortness of shortness of breath, no CHEUNG  Breasts:no complaints of breast pain, breast lump, or nipple discharge  Gastrointestinal: no complaints of abdominal pain, constipation, nausea, vomiting, or diarrhea or bloody stools  Genitourinary : no complaints of dysuria, incontinence, pelvic pain, no dysmenorrhea, vaginal discharge or abnormal vaginal bleeding and as noted in HPI  Musculoskeletal: no complaints of arthralgia, no myalgia, no joint swelling or stiffness, no limb pain or swelling    Integumentary: no complaints of skin rash or lesion, itching or dry skin  Neurological: no complaints of headache, no confusion, no numbness or tingling, no dizziness or fainting    Objective      /81   Pulse 86   Ht 5' 8" (1 727 m)   Wt 111 kg (244 lb 12 8 oz)   LMP 02/22/2022 (Approximate)   BMI 37 22 kg/m²     General:   appears stated age, cooperative, alert normal mood and affect   Neck: normal, supple,trachea midline, no masses   Heart: regular rate and rhythm, S1, S2 normal, no murmur, click, rub or gallop   Lungs: clear to auscultation bilaterally   Breasts: normal appearance, no masses or tenderness, Inspection negative, No nipple retraction or dimpling, No nipple discharge or bleeding, No axillary or supraclavicular adenopathy, Normal to palpation without dominant masses, Taught monthly breast self examination   Abdomen: soft, non-tender, without masses or organomegaly   Vulva: normal female genitalia, Bartholin's, Urethra, [place] normal   Vagina: normal vagina, no discharge, exudate, lesion, or erythema   Urethra: normal   Cervix: Normal, no discharge  PAP done  Nontender  Uterus: normal size, contour, position, consistency, mobility, non-tender, anteverted and mobile   Adnexa: normal adnexa and no mass, fullness, tenderness   Lymphatic palpation of lymph nodes in neck, axilla, groin and/or other locations: no lymphadenopathy or masses noted   Skin normal skin turgor and no rashes     Psychiatric orientation to person, place, and time: normal  mood and affect: normal

## 2022-03-07 NOTE — PATIENT INSTRUCTIONS
HPV (Human Papillomavirus)   WHAT YOU NEED TO KNOW:   What is human papillomavirus (HPV)? HPV is the most common infection spread by sexual contact  It can also be spread from a mother to her baby during delivery  HPV may cause oral and genital warts or tumors in your nose, mouth, throat, and lungs  HPV may also cause vaginal, penile, and anal cancers  You may not show symptoms of any of these conditions for several years after being exposed to HPV  What are the symptoms of HPV? · Painless warts    · Genital or anal discharge, bleeding, itching, or pain    · Pain when you urinate    How is HPV diagnosed? Your healthcare provider may use a vinegar liquid to help diagnose HPV genital warts  Women 27to 72years old can be checked for HPV during regular cervical cancer screenings  An HPV test checks for certain types of HPV that can cause changes in cervical cells  Without treatment, the changed cells can become cancer  An HPV test can be done every 5 years if the results show no infection  The test can be done with or without a Pap smear  A Pap smear checks for cancer or for abnormal cells that can become cancer  You may be tested for HPV if you are diagnosed with a mouth or throat cancer  How is HPV treated? HPV cannot be cured  Conditions that are caused by HPV can be treated  You will need to be monitored closely for these conditions  Ask your healthcare provider for more information about monitoring, conditions caused by HPV, and available treatments  How can HPV infection be prevented? · Ask about the HPV vaccine  The vaccine can help protect against HPV infection  Females and males can receive the vaccine  It is most effective if given before sexual activity begins  This allows the body to build almost complete protection against HPV before contact with the virus  The vaccine is usually given at 6or 15years of age but may be given as early as 5 years   The vaccine can be given through age 45     · Always use a condom during intercourse  A condom will not completely protect you from HPV infection, but it will help lower your risk  Use a new condom or latex barrier each time you have sex  This includes oral, vaginal, and anal sex  Make sure the condom fits and is put on correctly  Rubber latex sheets or dental dams can be used for oral sex  If you are allergic to latex, use a nonlatex product such as polyurethane  When should I call my doctor? · You have warts in your genital or anal area  · You have genital or anal discharge, bleeding, itching, or pain  · You have pain when you urinate  · You have questions or concerns about your condition or care  CARE AGREEMENT:   You have the right to help plan your care  Learn about your health condition and how it may be treated  Discuss treatment options with your healthcare providers to decide what care you want to receive  You always have the right to refuse treatment  The above information is an  only  It is not intended as medical advice for individual conditions or treatments  Talk to your doctor, nurse or pharmacist before following any medical regimen to see if it is safe and effective for you  © Copyright [employer] 2022 Information is for End User's use only and may not be sold, redistributed or otherwise used for commercial purposes  All illustrations and images included in CareNotes® are the copyrighted property of A D A M , Inc  or [facility] Marilyn Preciado   Cigarette Smoking and Your Health, Ambulatory Care   GENERAL INFORMATION:   What are the risks to my health if I smoke? Chemicals in tobacco are addictive and damage every cell in your body  All tobacco products are dangerous to you and to nonsmokers who breathe your secondhand smoke  Even if you are a light smoker or a social smoker, you have an increased risk for cancer, heart disease, and lung disease   If you are pregnant or have diabetes, smoking increases your risk for complications  What are the benefits to my health if I stop smoking? · Lower risk of cancer, heart disease, blood clots, heart attack, and stroke    · Lower risk of diabetes complications, such as kidney, artery, or eye disease, and nerve damage that can result in amputations    · Lower risk of lung infections and diseases, such as pneumonia, asthma, chronic bronchitis, and emphysema           · Increased benefits of chemotherapy if you already have cancer, and decreased risk for cancer returning after treatment    · Improved circulation, allowing more oxygen to be delivered to your body    · Improved ability to heal and to fight infections  What are the benefits to the health of others if I stop smoking? · Lower risk of lung cancer and heart disease in nonsmokers    · Lower risk of miscarriage, early delivery, low birth weight, and stillbirth    · Lower risk of SIDS, obesity, developmental delay, ear infections, colds, pneumonia, bronchitis, asthma, and ADHD in your child  Where can I find more information and support to stop smoking? It is never too late to stop smoking  Ask your healthcare provider for more information if you need help  · [place]  Phone: 1- 074 - [phone]  Web Address: www [employer]  Follow up with your healthcare provider as directed:  Write down your questions so you remember to ask them during your visits  CARE AGREEMENT:   You have the right to help plan your care  Learn about your health condition and how it may be treated  Discuss treatment options with your caregivers to decide what care you want to receive  You always have the right to refuse treatment  The above information is an  only  It is not intended as medical advice for individual conditions or treatments  Talk to your doctor, nurse or pharmacist before following any medical regimen to see if it is safe and effective for you    © 2014 [address] is for End User's use only and may not be sold, redistributed or otherwise used for commercial purposes  All illustrations and images included in CareNotes® are the copyrighted property of A D A M , Inc  or Odin Sargent

## 2022-03-09 LAB
HPV HR 12 DNA CVX QL NAA+PROBE: NEGATIVE
HPV16 DNA CVX QL NAA+PROBE: NEGATIVE
HPV18 DNA CVX QL NAA+PROBE: NEGATIVE

## 2022-03-11 ENCOUNTER — TELEPHONE (OUTPATIENT)
Dept: ENDOCRINOLOGY | Facility: CLINIC | Age: 42
End: 2022-03-11

## 2022-03-12 LAB
LAB AP GYN PRIMARY INTERPRETATION: NORMAL
Lab: NORMAL

## 2022-03-14 ENCOUNTER — TELEPHONE (OUTPATIENT)
Dept: OBGYN CLINIC | Facility: CLINIC | Age: 42
End: 2022-03-14

## 2022-03-14 NOTE — TELEPHONE ENCOUNTER
L/m to pt that her results from 3/7/2022 came back negative  Results are pap is negative  If pt has any questions she can see thru mychart or call back office

## 2022-03-15 ENCOUNTER — LAB (OUTPATIENT)
Dept: LAB | Facility: CLINIC | Age: 42
End: 2022-03-15
Payer: COMMERCIAL

## 2022-03-15 DIAGNOSIS — E55.9 VITAMIN D DEFICIENCY: ICD-10-CM

## 2022-03-15 DIAGNOSIS — E89.0 POSTOPERATIVE HYPOTHYROIDISM: ICD-10-CM

## 2022-03-15 DIAGNOSIS — E83.51 HYPOCALCEMIA: ICD-10-CM

## 2022-03-15 LAB
25(OH)D3 SERPL-MCNC: 25.6 NG/ML (ref 30–100)
ALBUMIN SERPL BCP-MCNC: 3.5 G/DL (ref 3.5–5)
ANION GAP SERPL CALCULATED.3IONS-SCNC: 10 MMOL/L (ref 4–13)
BUN SERPL-MCNC: 14 MG/DL (ref 5–25)
CALCIUM SERPL-MCNC: 7.2 MG/DL (ref 8.3–10.1)
CHLORIDE SERPL-SCNC: 106 MMOL/L (ref 100–108)
CO2 SERPL-SCNC: 26 MMOL/L (ref 21–32)
CREAT SERPL-MCNC: 0.85 MG/DL (ref 0.6–1.3)
GFR SERPL CREATININE-BSD FRML MDRD: 84 ML/MIN/1.73SQ M
GLUCOSE P FAST SERPL-MCNC: 104 MG/DL (ref 65–99)
POTASSIUM SERPL-SCNC: 4.6 MMOL/L (ref 3.5–5.3)
SODIUM SERPL-SCNC: 142 MMOL/L (ref 136–145)
T4 FREE SERPL-MCNC: 1.06 NG/DL (ref 0.76–1.46)
TSH SERPL DL<=0.05 MIU/L-ACNC: 0.42 UIU/ML (ref 0.36–3.74)

## 2022-03-15 PROCEDURE — 82306 VITAMIN D 25 HYDROXY: CPT

## 2022-03-15 PROCEDURE — 84443 ASSAY THYROID STIM HORMONE: CPT

## 2022-03-15 PROCEDURE — 84439 ASSAY OF FREE THYROXINE: CPT

## 2022-03-15 PROCEDURE — 36415 COLL VENOUS BLD VENIPUNCTURE: CPT

## 2022-03-15 PROCEDURE — 82040 ASSAY OF SERUM ALBUMIN: CPT

## 2022-03-15 PROCEDURE — 80048 BASIC METABOLIC PNL TOTAL CA: CPT

## 2022-03-16 ENCOUNTER — OFFICE VISIT (OUTPATIENT)
Dept: ENDOCRINOLOGY | Facility: CLINIC | Age: 42
End: 2022-03-16
Payer: COMMERCIAL

## 2022-03-16 VITALS
BODY MASS INDEX: 37.18 KG/M2 | WEIGHT: 244.5 LBS | HEART RATE: 72 BPM | TEMPERATURE: 97.3 F | SYSTOLIC BLOOD PRESSURE: 108 MMHG | DIASTOLIC BLOOD PRESSURE: 76 MMHG

## 2022-03-16 DIAGNOSIS — E89.0 POSTOPERATIVE HYPOTHYROIDISM: Primary | ICD-10-CM

## 2022-03-16 DIAGNOSIS — E83.51 HYPOCALCEMIA: ICD-10-CM

## 2022-03-16 DIAGNOSIS — E66.9 OBESITY (BMI 30-39.9): ICD-10-CM

## 2022-03-16 DIAGNOSIS — E55.9 VITAMIN D DEFICIENCY: ICD-10-CM

## 2022-03-16 PROCEDURE — 99214 OFFICE O/P EST MOD 30 MIN: CPT | Performed by: INTERNAL MEDICINE

## 2022-03-16 RX ORDER — CALCITRIOL 0.25 UG/1
CAPSULE, LIQUID FILLED ORAL
Qty: 90 CAPSULE | Refills: 1 | Status: SHIPPED | OUTPATIENT
Start: 2022-03-16

## 2022-03-16 RX ORDER — LEVOTHYROXINE SODIUM 150 UG/1
TABLET ORAL
Qty: 96 TABLET | Refills: 1 | Status: SHIPPED | OUTPATIENT
Start: 2022-03-16

## 2022-03-16 RX ORDER — ACETAMINOPHEN 160 MG
TABLET,DISINTEGRATING ORAL
Refills: 0
Start: 2022-03-16

## 2022-03-16 NOTE — PROGRESS NOTES
Jakub Iverson 42 y o  female MRN: [id number]    Encounter: [id number]      Assessment/Plan     Assessment: This is a 42y o -year-old female with hypothyroidism  Plan:    Diagnoses and all orders for this visit:    Postoperative hypothyroidism  Lab Results   Component Value Date    [id number] 0 416 03/15/2022   TSH is 0 4  Patient is clinically and biochemically euthyroid  Continue Synthroid 150 mcg daily from Monday through Saturday and 1 5 tablet on Sunday  Repeat TSH, free T4 in 6 months  -     Synthroid 150 MCG tablet; Take 1 tablet daily Mon-Sat and 1 5 tablets on Sunday  Brand necessary  -     T4, free; Future  -     TSH, 3rd generation; Future    Hypocalcemia  Corrected calcium is 7 7 mg/dL  Patient is symptomatic  Restart Rocaltrol 0 25 mg capsule once a day with dinner  Discussed not to stop Rocaltrol on her own  Discussed to take calcium supplementation 1200 mg twice a day  Repeat calcium in 2 weeks, will need to adjust the calcium does once the results are back  Also discussed to go to the ER if she notices excessive muscle spasms, tingling or numbness in extremities, chest pain or shortness of breath    -     calcitriol (ROCALTROL) 0 25 mcg capsule; Take one tablet with dinner  -     Basic metabolic panel; Future  -     Basic metabolic panel; Future  -     Albumin; Future    Vitamin D deficiency  Continue vitamin-D 3 supplementation, increase to 4000 International Units daily  Most recent vitamin-D is low  -     Cholecalciferol (Vitamin D3) 50 MCG (2000 UT) capsule; Take 4000 IU daily  -     Vitamin D 25 hydroxy; Future    Obesity (BMI 30-39  9)  Educated about dietary modifications and importance of weight loss      CC:   Hypoparathyroidism, hypocalcemia, postsurgical hypothyroidism    History of Present Illness     HPI:    Jakub Iverson is 69-year-old woman with medical history of hypoparathyroidism, postsurgical hypothyroidism is here for follow-up    She underwent total thyroidectomy in 2015 for goiter causing obstructive symptoms  Pathology report was benign as per patient  She is currently taking Synthroid 150 mcg daily Monday through Saturday and half tablet on Sunday  She denies missing doses  She takes it on empty stomach 4 hour apart from vitamin-D supplementation  No family history of thyroid cancer  She is not taking any biotin or Kelp supplementation  Most recent TSH is 0 4 which is normal   She reports having good energy  Hypoparathyroid-patient is currently not taking Rocaltrol   She takes calcium 600 mg 2 tablets daily twice a day  She c/o  twitching,  tingling or numbness in her extremities or around the lips, intermittently  She also complains of spasms of the muscle in her chest   For vitamin-D deficiency, she takes vitamin-D 3 4000 International Units daily  Her vitamin-D most recent is 25 6        Lab Results   Component Value Date    [id number] 0 416 03/15/2022         Review of Systems   Constitutional: Positive for fatigue  Negative for activity change, diaphoresis, fever and unexpected weight change  HENT: Negative  Eyes: Negative for visual disturbance  Respiratory: Negative for cough, chest tightness and shortness of breath  Cardiovascular: Negative for chest pain, palpitations and leg swelling  Gastrointestinal: Negative for abdominal pain, constipation, diarrhea, nausea and vomiting  Endocrine: Negative for cold intolerance, heat intolerance, polydipsia, polyphagia and polyuria  Genitourinary: Negative for dysuria, enuresis, frequency and urgency  Musculoskeletal: Positive for arthralgias and myalgias  +ve muscle spasms   Skin: Negative for pallor, rash and wound  Allergic/Immunologic: Negative  Neurological: Positive for tremors  Negative for dizziness, weakness and numbness  Hematological: Negative  Psychiatric/Behavioral: Negative  The patient is not nervous/anxious          Historical Information   Past Medical History:   Diagnosis Date    Anemia     H/O total thyroidectomy 10/2016    [facility] of [facility]roide[facility] ([facility] [address] ) 10/2015    Hypothyroidism      Past Surgical History:   Procedure Laterality Date    PARATHYROIDECTOMY  10/2015    TOTAL THYROIDECTOMY  10/2015     Social History   Social History     Substance and Sexual Activity   Alcohol Use Never     Social History     Substance and Sexual Activity   Drug Use Yes    Types: Marijuana    Comment: occasionally     Social History     Tobacco Use   Smoking Status Former Smoker    Packs/day: 0 25   Smokeless Tobacco Never Used   Tobacco Comment    Quit in 2020     Family History:   Family History   Problem Relation Age of Onset    Hypothyroidism Mother     Hypothyroidism Maternal Grandmother     Diabetes type II Maternal Grandmother     Thyroid disease Sister     Thyroid disease Sister     Thyroid disease Sister     Breast cancer Family     Colon cancer Neg Hx     Ovarian cancer Neg Hx        Meds/Allergies   Current Outpatient Medications   Medication Sig Dispense Refill    calcium carbonate (OS-DENNIS) 600 MG tablet Take 2 tablets (1,200 mg total) by mouth 2 (two) times a day with meals      Cholecalciferol (Vitamin D3) 50 MCG (2000 UT) capsule Take 4000 IU daily  0    Synthroid 150 MCG tablet Take 1 tablet daily Mon-Sat and 1 5 tablets on Sunday  Brand necessary 96 tablet 1    calcitriol (ROCALTROL) 0 25 mcg capsule Take one tablet with dinner 90 capsule 1    hydrOXYzine HCL (ATARAX) 50 mg tablet Take 1 tablet (50 mg total) by mouth every 6 (six) hours as needed for itching (Patient not taking: Reported on 11/3/2021) 15 tablet 0     No current facility-administered medications for this visit       Allergies   Allergen Reactions    Latex      Other reaction(s): swollen sore sensation       Objective   Vitals: Blood pressure 108/76, pulse 72, temperature (!) 97 3 °F (36 3 °C), weight 111 kg (244 lb 8 oz), last menstrual period 02/22/2022, not currently breastfeeding  Physical Exam  Constitutional:       General: She is not in acute distress  Appearance: Normal appearance  She is not ill-appearing  HENT:      Head: Normocephalic and atraumatic  Nose: Nose normal    Eyes:      Extraocular Movements: Extraocular movements intact  Conjunctiva/sclera: Conjunctivae normal    Cardiovascular:      Rate and Rhythm: Normal rate and regular rhythm  Pulses: Normal pulses  Heart sounds: Normal heart sounds  Pulmonary:      Effort: Pulmonary effort is normal  No respiratory distress  Breath sounds: Normal breath sounds  Abdominal:      General: Abdomen is flat  Palpations: Abdomen is soft  Musculoskeletal:         General: Normal range of motion  Cervical back: Normal range of motion and neck supple  Skin:     General: Skin is warm and dry  Findings: No rash  Neurological:      General: No focal deficit present  Mental Status: She is alert and oriented to person, place, and time  Psychiatric:         Mood and Affect: Mood normal          Behavior: Behavior normal          The history was obtained from the review of the chart, patient  Lab Results:   Lab Results   Component Value Date/Time    TSH 3RD GENERATON 0 416 03/15/2022 06:09 AM    TSH 3RD GENERATON 17 405 (H) 10/30/2021 09:27 AM    Free T4 1 06 03/15/2022 06:09 AM    Free T4 0 90 10/30/2021 09:27 AM       Imaging Studies:       I have personally reviewed pertinent reports  Portions of the record may have been created with voice recognition software  Occasional wrong word or "sound a like" substitutions may have occurred due to the inherent limitations of voice recognition software  Read the chart carefully and recognize, using context, where substitutions have occurred

## 2022-09-15 ENCOUNTER — TELEPHONE (OUTPATIENT)
Dept: ENDOCRINOLOGY | Facility: CLINIC | Age: 42
End: 2022-09-15

## 2022-10-19 DIAGNOSIS — E89.0 POSTOPERATIVE HYPOTHYROIDISM: ICD-10-CM

## 2022-10-19 RX ORDER — LEVOTHYROXINE SODIUM 150 MCG
TABLET ORAL
Qty: 96 TABLET | Refills: 0 | Status: SHIPPED | OUTPATIENT
Start: 2022-10-19

## 2022-10-21 ENCOUNTER — OFFICE VISIT (OUTPATIENT)
Dept: FAMILY MEDICINE CLINIC | Facility: CLINIC | Age: 42
End: 2022-10-21
Payer: COMMERCIAL

## 2022-10-21 VITALS
HEIGHT: 68 IN | DIASTOLIC BLOOD PRESSURE: 78 MMHG | HEART RATE: 97 BPM | SYSTOLIC BLOOD PRESSURE: 126 MMHG | WEIGHT: 248 LBS | TEMPERATURE: 97.5 F | BODY MASS INDEX: 37.59 KG/M2 | OXYGEN SATURATION: 96 %

## 2022-10-21 DIAGNOSIS — L50.9 URTICARIA: ICD-10-CM

## 2022-10-21 DIAGNOSIS — Z11.59 NEED FOR HEPATITIS C SCREENING TEST: ICD-10-CM

## 2022-10-21 DIAGNOSIS — E89.0 POSTOPERATIVE HYPOTHYROIDISM: ICD-10-CM

## 2022-10-21 DIAGNOSIS — Z13.1 SCREENING FOR DIABETES MELLITUS: ICD-10-CM

## 2022-10-21 DIAGNOSIS — E83.51 HYPOCALCEMIA: ICD-10-CM

## 2022-10-21 DIAGNOSIS — E55.9 VITAMIN D DEFICIENCY: ICD-10-CM

## 2022-10-21 DIAGNOSIS — Z13.220 SCREENING FOR LIPID DISORDERS: ICD-10-CM

## 2022-10-21 DIAGNOSIS — Z76.89 ENCOUNTER TO ESTABLISH CARE: ICD-10-CM

## 2022-10-21 DIAGNOSIS — E89.2 HISTORY OF PARATHYROIDECTOMY (HCC): ICD-10-CM

## 2022-10-21 DIAGNOSIS — Z11.4 SCREENING FOR HIV (HUMAN IMMUNODEFICIENCY VIRUS): ICD-10-CM

## 2022-10-21 DIAGNOSIS — Z00.00 ANNUAL PHYSICAL EXAM: Primary | ICD-10-CM

## 2022-10-21 PROBLEM — E66.9 OBESITY (BMI 30-39.9): Status: RESOLVED | Noted: 2021-11-03 | Resolved: 2022-10-21

## 2022-10-21 PROCEDURE — 99386 PREV VISIT NEW AGE 40-64: CPT

## 2022-10-21 PROCEDURE — 99214 OFFICE O/P EST MOD 30 MIN: CPT

## 2022-10-21 RX ORDER — PREDNISONE 20 MG/1
40 TABLET ORAL DAILY
Qty: 10 TABLET | Refills: 0 | Status: SHIPPED | OUTPATIENT
Start: 2022-10-21 | End: 2022-10-26

## 2022-10-21 RX ORDER — FAMOTIDINE 40 MG/1
20 TABLET, FILM COATED ORAL DAILY
Qty: 30 TABLET | Refills: 1 | Status: CANCELLED | OUTPATIENT
Start: 2022-10-21

## 2022-10-21 NOTE — ASSESSMENT & PLAN NOTE
Believes her trigger is stress has a family hx of lupus and would like to be tested, does not have rash on face  VASYL ordered and discussed stress relief techniques  Start prednisone as prescribed, continue OTC antihistamine as needed for itching discussed adding OTC famotidine to increase effects

## 2022-10-21 NOTE — PROGRESS NOTES
[facility]    NAME: Derrell Whitney  AGE: 36 y o  SEX: female  : 1980     DATE: 10/21/2022     Assessment and Plan:     Problem List Items Addressed This Visit        Endocrine    Postoperative hypothyroidism     Follows with endo has appointment in December  On Synthroid 150mcg  Relevant Medications    predniSONE 20 mg tablet       Musculoskeletal and Integument    Urticaria     Believes her trigger is stress has a family hx of lupus and would like to be tested, does not have rash on face  VASYL ordered and discussed stress relief techniques  Start prednisone as prescribed, continue OTC antihistamine as needed for itching discussed adding OTC famotidine to increase effects  Relevant Medications    predniSONE 20 mg tablet    Other Relevant Orders    VASYL Screen w/ Reflex to Titer/Pattern       Other    Hypocalcemia     Follows with Endocrinology  On calcium carbonate 1200 mg BID  Vitamin D deficiency     On Vit D supplements and follows with endocrinology  History of parathyroidectomy [facility])     Follows with endocrinology, has appointment in December  BMI 37 0-37 9, adult     Discussed healthy eating habits and regular exercise  Annual physical exam - Primary     Normal exam  Labs ordered, refused flu a nd COVID vaccines  Has script for mammogram remind to follow-up  Discussed regular exercise and eating healthier           Relevant Orders    CBC and differential      Other Visit Diagnoses     Screening for lipid disorders        Relevant Orders    Lipid panel    Screening for diabetes mellitus        Relevant Orders    Hemoglobin A1C    Need for hepatitis C screening test        Relevant Orders    Hepatitis C Antibody ([facility])    Screening for HIV (human immunodeficiency virus)        Relevant Orders    HIV 1/2 Antigen/Antibody (4th Generation) w Reflex [facility] Encounter to establish care              Immunizations and preventive care screenings were discussed with patient today  Appropriate education was printed on patient's after visit summary  Counseling:  Alcohol/drug use: discussed moderation in alcohol intake, the recommendations for healthy alcohol use, and avoidance of illicit drug use  Sexual health: discussed sexually transmitted diseases, partner selection, use of condoms, avoidance of unintended pregnancy, and contraceptive alternatives  · Exercise: the importance of regular exercise/physical activity was discussed  Recommend exercise 3-5 times per week for at least 30 minutes  Depression Screening and Follow-up Plan: Patient was screened for depression during today's encounter  They screened negative with a PHQ-2 score of 0  Return if symptoms worsen or fail to improve  Chief Complaint:     Chief Complaint   Patient presents with   • Urticaria     Started Monday 10/17/2022      History of Present Illness:     Adult Annual Physical   Patient here for a comprehensive physical exam and to establish care  She has not seen her previous PCP in over 5 years  The patient reports Hives has been using antihistamine since Monday with no improvement  She believes it is a reaction to stress as she has a family hx of lupus  Would like to be tested  She is aware routine physical and problem visit to be completed today  Diet and Physical Activity  · Diet/Nutrition: poor diet, limited junk food, consuming 3-5 servings of fruits/vegetables daily and limited fruits/vegetables  · Exercise: no formal exercise  Depression Screening  PHQ-2/9 Depression Screening    Little interest or pleasure in doing things: 0 - not at all  Feeling down, depressed, or hopeless: 0 - not at all  PHQ-2 Score: 0  PHQ-2 Interpretation: Negative depression screen       General Health  · Sleep: sleeps well and gets 7-8 hours of sleep on average     · Hearing: normal - bilateral   · Vision: no vision problems  · Dental: regular dental visits and brushes teeth twice daily  /GYN Health  · Patient is: perimenopausal  · Last menstrual period: 09/22/22  · Contraceptive method: none  Review of Systems:     Review of Systems   Constitutional: Negative for activity change, appetite change, chills, diaphoresis, fatigue, fever and unexpected weight change  HENT: Negative for congestion, dental problem, drooling, ear pain, facial swelling, mouth sores, postnasal drip, rhinorrhea, sinus pressure, sinus pain, sneezing, sore throat, trouble swallowing and voice change  Eyes: Negative for photophobia, pain, discharge, redness and visual disturbance  Respiratory: Negative for apnea, cough, chest tightness, shortness of breath and wheezing  Cardiovascular: Negative for chest pain, palpitations and leg swelling  Gastrointestinal: Negative for abdominal pain, blood in stool, constipation, diarrhea, nausea and vomiting  Endocrine: Negative for cold intolerance, heat intolerance and polyuria  Genitourinary: Negative for difficulty urinating, dysuria, hematuria, vaginal bleeding and vaginal discharge  Musculoskeletal: Negative for arthralgias, back pain, myalgias and neck stiffness  Skin: Negative for color change and rash  Allergic/Immunologic: Negative for environmental allergies, food allergies and immunocompromised state  Neurological: Negative for dizziness, seizures, syncope, weakness, light-headedness, numbness and headaches  Hematological: Negative for adenopathy  Psychiatric/Behavioral: Negative for agitation, confusion, decreased concentration, dysphoric mood, sleep disturbance and suicidal ideas  The patient is not nervous/anxious  All other systems reviewed and are negative       Past Medical History:     Past Medical History:   Diagnosis Date   • Anemia    • H/O total thyroidectomy 10/2016   • History of parathyroidectomy ([facility] [address] ) 10/2015   • Hypothyroidism       Past Surgical History:     Past Surgical History:   Procedure Laterality Date   • PARATHYROIDECTOMY  10/2015   • TOTAL THYROIDECTOMY  10/2015      Social History:     Social History     Socioeconomic History   • Marital status:      Spouse name: None   • Number of children: None   • Years of education: None   • Highest education level: None   Occupational History   • None   Tobacco Use   • Smoking status: Former Smoker     Packs/day: 0 25   • Smokeless tobacco: Never Used   • Tobacco comment: Quit in 2020   Vaping Use   • Vaping Use: Every day   • Substances: THC   Substance and Sexual Activity   • Alcohol use: Never   • Drug use: Yes     Types: Marijuana     Comment: occasionally   • Sexual activity: Not Currently     Partners: Male   Other Topics Concern   • None   Social History Narrative   • None     Social Determinants of Health     Financial Resource Strain: Low Risk    • Difficulty of Paying Living Expenses: Not hard at all   Food Insecurity: No Food Insecurity   • Worried About Running Out of Food in the Last Year: Never true   • Ran Out of Food in the Last Year: Never true   Transportation Needs: No Transportation Needs   • Lack of Transportation (Medical): No   • Lack of Transportation (Non-Medical):  No   Physical Activity: Insufficiently Active   • Days of Exercise per Week: 1 day   • Minutes of Exercise per Session: 60 min   Stress: No Stress Concern Present   • Feeling of Stress : Not at all   Social Connections: Socially Isolated   • Frequency of Communication with Friends and Family: More than three times a week   • Frequency of Social Gatherings with Friends and Family: More than three times a week   • Attends Protestant Services: Never   • Active Member of Clubs or Organizations: No   • Attends Club or Organization Meetings: Never   • Marital Status:    Intimate Partner Violence: Not At Risk   • Fear of Current or Ex-Partner: No   • Emotionally Abused: No   • Physically Abused: No   • Sexually Abused: No   Housing Stability: Low Risk    • Unable to Pay for Housing in the Last Year: No   • Number of Places Lived in the Last Year: 1   • Unstable Housing in the Last Year: No      Family History:     Family History   Problem Relation Age of Onset   • Hypothyroidism Mother    • Lupus Mother    • Thyroid disease Sister    • Thyroid disease Sister    • Thyroid disease Sister    • Hypothyroidism Maternal Grandmother    • Diabetes type II Maternal Grandmother    • Lupus Maternal Grandmother    • Breast cancer Family    • Colon cancer Neg Hx    • Ovarian cancer Neg Hx       Current Medications:     Current Outpatient Medications   Medication Sig Dispense Refill   • calcitriol (ROCALTROL) 0 25 mcg capsule Take one tablet with dinner 90 capsule 1   • calcium carbonate (OS-DENNIS) 600 MG tablet Take 2 tablets (1,200 mg total) by mouth 2 (two) times a day with meals     • Cholecalciferol (Vitamin D3) 50 MCG (2000 UT) capsule Take 4000 IU daily  0   • predniSONE 20 mg tablet Take 2 tablets (40 mg total) by mouth daily for 5 days 10 tablet 0   • Synthroid 150 MCG tablet TAKE 1 TABLET DAILY MON-SAT AND 1 5 TABLETS ON SUNDAY  BRAND NECESSARY 96 tablet 0   • hydrOXYzine HCL (ATARAX) 50 mg tablet Take 1 tablet (50 mg total) by mouth every 6 (six) hours as needed for itching (Patient not taking: No sig reported) 15 tablet 0     No current facility-administered medications for this visit  Allergies: Allergies   Allergen Reactions   • Latex      Other reaction(s): swollen sore sensation      Physical Exam:     /78 (BP Location: Right arm, Patient Position: Sitting, Cuff Size: Standard)   Pulse 97   Temp 97 5 °F (36 4 °C) (Temporal)   Ht 5' 8" (1 727 m)   Wt 112 kg (248 lb)   SpO2 96%   BMI 37 71 kg/m²     Physical Exam  Vitals and nursing note reviewed  Constitutional:       General: She is not in acute distress  Appearance: Normal appearance  She is well-developed   She is obese  She is not ill-appearing or toxic-appearing  HENT:      Head: Normocephalic and atraumatic  Right Ear: Tympanic membrane, ear canal and external ear normal  There is no impacted cerumen  Left Ear: Tympanic membrane, ear canal and external ear normal  There is no impacted cerumen  Nose: Nose normal  No congestion or rhinorrhea  Mouth/Throat:      Mouth: Mucous membranes are moist       Pharynx: No oropharyngeal exudate or posterior oropharyngeal erythema  Eyes:      General: No scleral icterus  Right eye: No discharge  Left eye: No discharge  Extraocular Movements: Extraocular movements intact  Conjunctiva/sclera: Conjunctivae normal       Pupils: Pupils are equal, round, and reactive to light  Cardiovascular:      Rate and Rhythm: Normal rate and regular rhythm  Pulses: Normal pulses  Heart sounds: Normal heart sounds  No murmur heard  Pulmonary:      Effort: Pulmonary effort is normal  No respiratory distress  Breath sounds: Normal breath sounds  No wheezing  Chest:      Chest wall: No tenderness  Abdominal:      General: Bowel sounds are normal  There is no distension  Palpations: Abdomen is soft  There is no mass  Tenderness: There is no abdominal tenderness  There is no right CVA tenderness, left CVA tenderness, guarding or rebound  Hernia: No hernia is present  Genitourinary:     Vagina: No vaginal discharge  Musculoskeletal:         General: No swelling, tenderness, deformity or signs of injury  Cervical back: Neck supple  No rigidity or tenderness  Right lower leg: No edema  Left lower leg: No edema  Lymphadenopathy:      Cervical: No cervical adenopathy  Skin:     General: Skin is warm and dry  Capillary Refill: Capillary refill takes less than 2 seconds  Findings: Erythema and rash present   Rash is urticarial       Comments: Has hives on neck   Neurological:      General: No focal deficit present  Mental Status: She is alert and oriented to person, place, and time  Cranial Nerves: No cranial nerve deficit  Sensory: No sensory deficit  Motor: No weakness  Coordination: Coordination normal       Gait: Gait normal       Deep Tendon Reflexes: Reflexes normal    Psychiatric:         Mood and Affect: Mood normal          Behavior: Behavior normal          Thought Content:  Thought content normal          Judgment: Judgment normal           LOVELY Mattson  [address] [facility]

## 2022-10-21 NOTE — ASSESSMENT & PLAN NOTE
Normal exam  Labs ordered, refused flu a nd COVID vaccines  Has script for mammogram remind to follow-up  Discussed regular exercise and eating healthier

## 2022-10-21 NOTE — PATIENT INSTRUCTIONS

## 2022-10-22 ENCOUNTER — APPOINTMENT (OUTPATIENT)
Dept: LAB | Facility: CLINIC | Age: 42
End: 2022-10-22
Payer: COMMERCIAL

## 2022-10-22 DIAGNOSIS — Z11.4 SCREENING FOR HIV (HUMAN IMMUNODEFICIENCY VIRUS): ICD-10-CM

## 2022-10-22 DIAGNOSIS — E83.51 HYPOCALCEMIA: ICD-10-CM

## 2022-10-22 DIAGNOSIS — E89.0 POSTOPERATIVE HYPOTHYROIDISM: ICD-10-CM

## 2022-10-22 DIAGNOSIS — L50.9 URTICARIA: ICD-10-CM

## 2022-10-22 DIAGNOSIS — Z11.59 NEED FOR HEPATITIS C SCREENING TEST: ICD-10-CM

## 2022-10-22 DIAGNOSIS — Z13.1 SCREENING FOR DIABETES MELLITUS: ICD-10-CM

## 2022-10-22 DIAGNOSIS — Z00.00 ANNUAL PHYSICAL EXAM: ICD-10-CM

## 2022-10-22 DIAGNOSIS — Z13.220 SCREENING FOR LIPID DISORDERS: ICD-10-CM

## 2022-10-22 DIAGNOSIS — E55.9 VITAMIN D DEFICIENCY: ICD-10-CM

## 2022-10-22 LAB
25(OH)D3 SERPL-MCNC: 22.3 NG/ML (ref 30–100)
ALBUMIN SERPL BCP-MCNC: 4.3 G/DL (ref 3.5–5)
ANION GAP SERPL CALCULATED.3IONS-SCNC: 10 MMOL/L (ref 4–13)
BASOPHILS # BLD AUTO: 0.09 THOUSANDS/ÂΜL (ref 0–0.1)
BASOPHILS NFR BLD AUTO: 1 % (ref 0–1)
BUN SERPL-MCNC: 13 MG/DL (ref 5–25)
CALCIUM SERPL-MCNC: 7.7 MG/DL (ref 8.4–10.2)
CHLORIDE SERPL-SCNC: 105 MMOL/L (ref 96–108)
CHOLEST SERPL-MCNC: 217 MG/DL
CO2 SERPL-SCNC: 23 MMOL/L (ref 21–32)
CREAT SERPL-MCNC: 0.88 MG/DL (ref 0.6–1.3)
EOSINOPHIL # BLD AUTO: 0.41 THOUSAND/ÂΜL (ref 0–0.61)
EOSINOPHIL NFR BLD AUTO: 4 % (ref 0–6)
ERYTHROCYTE [DISTWIDTH] IN BLOOD BY AUTOMATED COUNT: 12 % (ref 11.6–15.1)
EST. AVERAGE GLUCOSE BLD GHB EST-MCNC: 114 MG/DL
GFR SERPL CREATININE-BSD FRML MDRD: 80 ML/MIN/1.73SQ M
GLUCOSE P FAST SERPL-MCNC: 105 MG/DL (ref 65–99)
HBA1C MFR BLD: 5.6 %
HCT VFR BLD AUTO: 44.2 % (ref 34.8–46.1)
HCV AB SER QL: NORMAL
HDLC SERPL-MCNC: 57 MG/DL
HGB BLD-MCNC: 15.3 G/DL (ref 11.5–15.4)
IMM GRANULOCYTES # BLD AUTO: 0.1 THOUSAND/UL (ref 0–0.2)
IMM GRANULOCYTES NFR BLD AUTO: 1 % (ref 0–2)
LDLC SERPL CALC-MCNC: 120 MG/DL (ref 0–100)
LYMPHOCYTES # BLD AUTO: 2.73 THOUSANDS/ÂΜL (ref 0.6–4.47)
LYMPHOCYTES NFR BLD AUTO: 27 % (ref 14–44)
MCH RBC QN AUTO: 31.7 PG (ref 26.8–34.3)
MCHC RBC AUTO-ENTMCNC: 34.6 G/DL (ref 31.4–37.4)
MCV RBC AUTO: 92 FL (ref 82–98)
MONOCYTES # BLD AUTO: 0.57 THOUSAND/ÂΜL (ref 0.17–1.22)
MONOCYTES NFR BLD AUTO: 6 % (ref 4–12)
NEUTROPHILS # BLD AUTO: 6.28 THOUSANDS/ÂΜL (ref 1.85–7.62)
NEUTS SEG NFR BLD AUTO: 61 % (ref 43–75)
NONHDLC SERPL-MCNC: 160 MG/DL
NRBC BLD AUTO-RTO: 0 /100 WBCS
PLATELET # BLD AUTO: 230 THOUSANDS/UL (ref 149–390)
PMV BLD AUTO: 10.5 FL (ref 8.9–12.7)
POTASSIUM SERPL-SCNC: 3.8 MMOL/L (ref 3.5–5.3)
RBC # BLD AUTO: 4.82 MILLION/UL (ref 3.81–5.12)
SODIUM SERPL-SCNC: 138 MMOL/L (ref 135–147)
T4 FREE SERPL-MCNC: 0.49 NG/DL (ref 0.76–1.46)
TRIGL SERPL-MCNC: 202 MG/DL
TSH SERPL DL<=0.05 MIU/L-ACNC: 24.56 UIU/ML (ref 0.45–4.5)
WBC # BLD AUTO: 10.18 THOUSAND/UL (ref 4.31–10.16)

## 2022-10-22 PROCEDURE — 85025 COMPLETE CBC W/AUTO DIFF WBC: CPT

## 2022-10-22 PROCEDURE — 87389 HIV-1 AG W/HIV-1&-2 AB AG IA: CPT

## 2022-10-22 PROCEDURE — 83036 HEMOGLOBIN GLYCOSYLATED A1C: CPT

## 2022-10-22 PROCEDURE — 82040 ASSAY OF SERUM ALBUMIN: CPT

## 2022-10-22 PROCEDURE — 80061 LIPID PANEL: CPT

## 2022-10-22 PROCEDURE — 82306 VITAMIN D 25 HYDROXY: CPT

## 2022-10-22 PROCEDURE — 36415 COLL VENOUS BLD VENIPUNCTURE: CPT

## 2022-10-22 PROCEDURE — 80048 BASIC METABOLIC PNL TOTAL CA: CPT

## 2022-10-22 PROCEDURE — 86803 HEPATITIS C AB TEST: CPT

## 2022-10-22 PROCEDURE — 84439 ASSAY OF FREE THYROXINE: CPT

## 2022-10-22 PROCEDURE — 86038 ANTINUCLEAR ANTIBODIES: CPT

## 2022-10-22 PROCEDURE — 84443 ASSAY THYROID STIM HORMONE: CPT

## 2022-10-24 LAB
HIV 1+2 AB+HIV1 P24 AG SERPL QL IA: NORMAL
RYE IGE QN: NEGATIVE

## 2022-10-25 DIAGNOSIS — L50.9 URTICARIA: Primary | ICD-10-CM

## 2022-10-25 RX ORDER — MONTELUKAST SODIUM 10 MG/1
10 TABLET ORAL
Qty: 30 TABLET | Refills: 1 | Status: SHIPPED | OUTPATIENT
Start: 2022-10-25 | End: 2022-11-04

## 2022-10-26 DIAGNOSIS — E89.2 HISTORY OF PARATHYROIDECTOMY (HCC): Primary | ICD-10-CM

## 2022-10-26 DIAGNOSIS — E55.9 VITAMIN D DEFICIENCY: ICD-10-CM

## 2022-10-26 NOTE — TELEPHONE ENCOUNTER
----- Message from Soraya Fuller MD sent at 10/26/2022  3:37 PM EDT -----  As per record, patient has Ms  Doses for Synthroid  Please inform patient to take Synthroid regularly and repeat TSH, free T4 in 6 weeks  Please order blood work/  Vitamin-D is low    He should also restart vitamin-D 3 supplementation 5000 International Units daily without missing it

## 2022-10-27 RX ORDER — ACETAMINOPHEN 160 MG
TABLET,DISINTEGRATING ORAL
Refills: 0
Start: 2022-10-27

## 2022-11-03 DIAGNOSIS — L50.9 URTICARIA: ICD-10-CM

## 2022-11-04 RX ORDER — MONTELUKAST SODIUM 10 MG/1
TABLET ORAL
Qty: 90 TABLET | Refills: 1 | Status: SHIPPED | OUTPATIENT
Start: 2022-11-04

## 2023-01-29 DIAGNOSIS — E89.0 POSTOPERATIVE HYPOTHYROIDISM: ICD-10-CM

## 2023-01-30 RX ORDER — LEVOTHYROXINE SODIUM 150 MCG
TABLET ORAL
Qty: 96 TABLET | Refills: 0 | Status: SHIPPED | OUTPATIENT
Start: 2023-01-30

## 2023-02-24 ENCOUNTER — OFFICE VISIT (OUTPATIENT)
Dept: OBGYN CLINIC | Facility: CLINIC | Age: 43
End: 2023-02-24

## 2023-02-24 VITALS
RESPIRATION RATE: 18 BRPM | BODY MASS INDEX: 38.8 KG/M2 | HEIGHT: 68 IN | HEART RATE: 99 BPM | SYSTOLIC BLOOD PRESSURE: 106 MMHG | WEIGHT: 256 LBS | DIASTOLIC BLOOD PRESSURE: 71 MMHG

## 2023-02-24 DIAGNOSIS — L08.9 SKIN PUSTULE: Primary | ICD-10-CM

## 2023-02-24 NOTE — PROGRESS NOTES
Assessment:      Vulvar pustule  Plan:      Symptomatic local care discussed  Warm compresses daily prn  Discussed avoiding squeezing or popping the lesion  Follow up in clinic for annual gyn exam    Subjective:       Meka Marquez is a 36 y o  female who presents for evaluation of small bump on L labial fold  Pt c/o new bump on labia that she noticed a week ago  Not red, swollen, uncomfortable or painful  Has not grown in size or discomfort  No drainage of lesion  No abnormal vaginal discharge  Menstrual pattern: bleeding regularly  Contraception: condoms  STI Risk: Very low risk of STD exposure  Hx chlamydia as a teenager, no other known STDs  Two sexual partners in the past year, uses condoms consistently  The following portions of the patient's history were reviewed and updated as appropriate: allergies, current medications, past family history, past medical history, past social history, past surgical history and problem list         Review of Systems  Review of Systems   Constitutional: Negative for chills and fever  HENT: Negative for ear pain and sore throat  Eyes: Negative for pain and visual disturbance  Respiratory: Negative for cough and shortness of breath  Cardiovascular: Negative for chest pain and palpitations  Gastrointestinal: Negative for abdominal pain and vomiting  Genitourinary: Negative for difficulty urinating, dyspareunia, dysuria, frequency, genital sores, hematuria, menstrual problem, pelvic pain, urgency, vaginal bleeding, vaginal discharge and vaginal pain  Musculoskeletal: Negative for arthralgias and back pain  Skin: Negative for color change and rash  Neurological: Negative for seizures and syncope  All other systems reviewed and are negative  Objective:     Vitals:    02/24/23 0821   BP: 106/71   Pulse: 99   Resp: 18       Physical Exam  Constitutional:       General: She is not in acute distress  Appearance: Normal appearance   She is well-developed  She is not ill-appearing or toxic-appearing  Genitourinary:      Rectum normal       Genitourinary Comments: Tampon strings visible      Right Labia: No rash or tenderness  Left Labia: lesions  Left Labia: No tenderness or rash  Pelvic Lavelle Score: 5/5  HENT:      Head: Normocephalic and atraumatic  Right Ear: External ear normal       Left Ear: External ear normal       Nose: Nose normal       Mouth/Throat:      Lips: No lesions  Mouth: Mucous membranes are moist       Pharynx: Oropharynx is clear  Eyes:      Conjunctiva/sclera: Conjunctivae normal    Cardiovascular:      Rate and Rhythm: Normal rate and regular rhythm  Heart sounds: Normal heart sounds  Neurological:      General: No focal deficit present  Mental Status: She is oriented to person, place, and time  Mental status is at baseline  Skin:     General: Skin is warm and dry  Psychiatric:         Behavior: Behavior is cooperative

## 2023-02-24 NOTE — PATIENT INSTRUCTIONS
Warm Compress or Soak   WHAT YOU NEED TO KNOW:   A warm compress or soak helps improve blood flow to tissues and relieve pain and swelling  This will help you heal from an injury or illness  DISCHARGE INSTRUCTIONS:   Call your doctor if:   Your symptoms do not improve or you have new symptoms  You see blisters on the area where you applied the compress or soak  You have questions or concerns about your condition or care  How to prepare and use a moist warm compress: Your healthcare provider will tell you how often to apply a warm compress:  Wash your hands  Use a washcloth, small towel, or gauze as your compress  You can place the compress under running water or place it in a bowl with warm water  Check the temperature of the water with a thermometer  The water should not be warmer than 100°F (37 8°C) for babies and children, 120°F (49°C) for adults, and 100°F (37 8°C) for older adults  Adults should use water that is 100°F (37 8°C) if they will apply the compress to an eye  If directed, add 1 tablespoon of salt to the water  Squeeze extra water out of the compress  Place the compress directly on the area  If directed, gently massage the area with the compress  Check your skin in 2 minutes for blisters or bright red skin  Your skin should look pink to light red  You may need to rewarm the compress every 5 minutes  Remove the compress in 15 to 30 minutes, or when the compress starts to feel cold  Gently pat your skin dry with a clean towel  Wash your hands  Reapply the compress as many times as directed each day  Use a clean compress every time  How to use a dry warm compress:  A dry compress may be a hot water bottle or a heating pad  You can also buy a prepared hot pack  Follow the package directions for how to use these devices  Cover a bottle or hot pack with a towel before you apply it to your skin   Do not leave a dry compress on your skin for more than 20 minutes or as directed  Do not fall asleep with a dry compress on your skin  A dry compress may burn your skin if it is left on for too long  How to prepare and use a warm soak:   Fill a clean container or tub with warm water and soap  The container should be deep enough to cover the area completely  Check the temperature of the water with a thermometer  The water should not be warmer than 100°F (37 8°C) for children and babies, and 110°F (43°C) for adults  If directed, add 1 tablespoon of salt to the water  Remove any bandages  Soak the area for 30 minutes or as long as directed  Gently pat your skin dry when you are done soaking  Replace bandages as directed  Clean the container or tub when finished  Wash your hands  Follow up with your doctor as directed:  Write down your questions so you remember to ask them during your visits  © Copyright [facility] [employer] 2022 Information is for End User's use only and may not be sold, redistributed or otherwise used for commercial purposes  The above information is an  only  It is not intended as medical advice for individual conditions or treatments  Talk to your doctor, nurse or pharmacist before following any medical regimen to see if it is safe and effective for you

## 2023-03-27 NOTE — PROGRESS NOTES
ASSESSMENT & PLAN: Diana Garza is a 36 y o  Jorge Renee with normal gynecologic exam     1   Routine well woman exam done today  2  Pap and HPV:  The patient's last pap and hpv was 3/7/2022  It was normal     Pap and cotesting was not done today  Denies abnormal Pap history  Current ASCCP Guidelines reviewed  Next Pap due 3/7/2027  3  Bilateral 3D  Mammogram ordered  4  The following were reviewed in today's visit: breast self exam, STD testing, adequate intake of calcium and vitamin D, exercise and healthy diet  Recently quit smoking  5  Gardisil vaccine in women up to age 39 discussed and information was provided  Pt declines    Depression Screening Follow-up Plan: Patient's depression screening was negative with a PHQ-2 score of 0   Their PHQ-9 score was 0   Clinically patient does not have depression  No treatment is required  BMI Counseling: Body mass index is 38 01 kg/m²  The BMI is above normal  Nutrition recommendations include 3-5 servings of fruits/vegetables daily, moderation in carbohydrate intake and reducing intake of cholesterol  Exercise recommendations include exercising 3-5 times per week  CC:  Annual Gynecologic Examination    HPI: Diana Garza is a 36 y o  Jorge Renee who presents for annual gynecologic examination  currently is not sexually active  She has the following concerns:  Her last menses was 1 week early  Menses usually monthly x7 days  Reviewed to monitor and call if abnormal, parameters reviewed  Health Maintenance:    She wears her seatbelt routinely  She does perform irregular monthly self breast exams  She feels safe at home       Patient Active Problem List   Diagnosis   • Hypocalcemia   • Postoperative hypothyroidism   • Vitamin D deficiency   • History of parathyroidectomy ([facility] [address] )   • Vaginal yeast infection   • Vaginal discharge   • High risk sexual behavior   • Encounter for screening mammogram for malignant neoplasm of breast   • Encounter for gynecological examination without abnormal finding   • Urticaria   • BMI 37 0-37 9, adult   • Annual physical exam       Past Medical History:   Diagnosis Date   • Anemia    • H/O total thyroidectomy 10/2016   • [facility] of [facility] 10/2015   • Hypothyroidism        Past Surgical History:   Procedure Laterality Date   • PARATHYROIDECTOMY  10/2015   • TOTAL THYROIDECTOMY  10/2015       Past OB/Gyn History:  OB History        1    Para        Term                AB   1    Living           SAB   1    IAB        Ectopic        Multiple        Live Births                      Pt does not have menstrual issues  Monthly x 7 days  History of sexually transmitted infection: Yes  History of chlamydia as a teenager  history of abnormal pap smears: No      Patient is not currently sexually active  heterosexual  The current method of family planning is abstinence      Family History   Problem Relation Age of Onset   • Hypothyroidism Mother    • Lupus Mother    • Thyroid disease Sister    • Thyroid disease Sister    • Thyroid disease Sister    • Hypothyroidism Maternal Grandmother    • Diabetes type II Maternal Grandmother    • Lupus Maternal Grandmother    • Breast cancer Family    • Colon cancer Neg Hx    • Ovarian cancer Neg Hx        Social History:  Social History     Socioeconomic History   • Marital status:      Spouse name: Not on file   • Number of children: Not on file   • Years of education: Not on file   • Highest education level: Not on file   Occupational History   • Not on file   Tobacco Use   • Smoking status: Former     Packs/day: 0 25     Types: Cigarettes   • Smokeless tobacco: Never   • Tobacco comments:     Quit in    Vaping Use   • Vaping Use: Every day   • Substances: THC   Substance and Sexual Activity   • Alcohol use: Never   • Drug use: Yes     Types: Marijuana     Comment: occasionally   • Sexual activity: Not Currently     Partners: Male   Other Topics Concern   • Not on file   Social History Narrative   • Not on file     Social Determinants of Health     Financial Resource Strain: Low Risk    • Difficulty of Paying Living Expenses: Not hard at all   Food Insecurity: No Food Insecurity   • Worried About [address] in the Last Year: Never true   • Ran Out of Food in the Last Year: Never true   Transportation Needs: No Transportation Needs   • Lack of Transportation (Medical): No   • Lack of Transportation (Non-Medical): No   Physical Activity: Not on file   Stress: Not on file   Social Connections: Not on file   Intimate Partner Violence: Not on file   Housing Stability: Low Risk    • Unable to Pay for Housing in the Last Year: No   • Number of Places Lived in the Last Year: 1   • Unstable Housing in the Last Year: No        Patient is   Patient is currently employed   Allergies   Allergen Reactions   • Latex      Other reaction(s): swollen sore sensation       Current Outpatient Medications:   •  calcitriol (ROCALTROL) 0 25 mcg capsule, Take one tablet with dinner, Disp: 90 capsule, Rfl: 1  •  calcium carbonate (OS-DENNIS) 600 MG tablet, Take 2 tablets (1,200 mg total) by mouth 2 (two) times a day with meals, Disp: , Rfl:   •  Cholecalciferol (Vitamin D3) 50 MCG (2000 UT) capsule, Take 5000 IU daily, Disp: , Rfl: 0  •  montelukast (SINGULAIR) 10 mg tablet, TAKE 1 TABLET BY MOUTH DAILY AT BEDTIME, Disp: 90 tablet, Rfl: 1  •  Synthroid 150 MCG tablet, TAKE 1 TABLET DAILY MON-SAT AND 1 5 TABLETS ON SUNDAY   BRAND NECESSARY, Disp: 96 tablet, Rfl: 0  •  hydrOXYzine HCL (ATARAX) 50 mg tablet, Take 1 tablet (50 mg total) by mouth every 6 (six) hours as needed for itching (Patient not taking: Reported on 11/3/2021), Disp: 15 tablet, Rfl: 0    Review of Systems:    Review of Systems  Constitutional :no fever, feels well, no tiredness, no recent weight gain or loss  ENT: no ear ache, no loss of hearing, no nosebleeds or nasal discharge, no sore throat or "hoarseness  Cardiovascular: no complaints of slow or fast heart beat, no chest pain, no palpitations, no leg claudication or lower extremity edema  Respiratory: no complaints of shortness of shortness of breath, no CHEUNG  Breasts:no complaints of breast pain, breast lump, or nipple discharge  Gastrointestinal: no complaints of abdominal pain, constipation, nausea, vomiting, or diarrhea or bloody stools  Genitourinary : no complaints of dysuria, incontinence, pelvic pain, no dysmenorrhea, vaginal discharge or abnormal vaginal bleeding and as noted in HPI  Musculoskeletal: no complaints of arthralgia, no myalgia, no joint swelling or stiffness, no limb pain or swelling  Integumentary: no complaints of skin rash or lesion, itching or dry skin  Neurological: no complaints of headache, no confusion, no numbness or tingling, no dizziness or fainting    Objective      /85 (BP Location: Left arm, Patient Position: Sitting, Cuff Size: Adult)   Pulse 86   Resp 18   Ht 5' 8\" (1 727 m)   Wt 113 kg (250 lb)   LMP 03/19/2023 (Approximate)   BMI 38 01 kg/m²     General:   appears stated age, cooperative, alert normal mood and affect   Neck: normal, supple,trachea midline, no masses   Heart: regular rate and rhythm, S1, S2 normal, no murmur, click, rub or gallop   Lungs: clear to auscultation bilaterally   Breasts: normal appearance, no masses or tenderness, Inspection negative, No nipple retraction or dimpling, No nipple discharge or bleeding, No axillary or supraclavicular adenopathy, Normal to palpation without dominant masses, Taught monthly breast self examination   Abdomen: soft, non-tender, without masses or organomegaly   Vulva: normal female genitalia, Bartholin's, Urethra, [place] normal   Vagina: normal vagina, no discharge, exudate, lesion, or erythema   Urethra: normal   Cervix: Normal, no discharge  Nontender     Uterus: normal size, contour, position, consistency, mobility, non-tender and mobile   Adnexa: " normal adnexa and no mass, fullness, tenderness   Lymphatic palpation of lymph nodes in neck, axilla, groin and/or other locations: no lymphadenopathy or masses noted   Skin normal skin turgor and no rashes     Psychiatric orientation to person, place, and time: normal  mood and affect: normal [Fever] : no fever [Eye Discharge] : no eye discharge [Icteric] : were not ~L icteric [Oral Thrush] : no oral thrush [Nasal Congestion] : no nasal congestion [Edema] : no edema [Difficulty Breathing] : no dyspnea [Cough] : no cough [Wheezing] : no wheezing [Vomiting] : no vomiting [Diarrhea] : no diarrhea [Arching with Feeds] : no arching with feeds [Regurgitation] : no regurgitation [Seizure] : no seizures [Dec Urine Output] : no oliguria [Atopic Dermatitis] : no atopic dermatitis [Blood in Stools] : no blood in stools [Skin Rash] : no skin rash [Synagis Injection] : no synagis injection

## 2023-03-28 ENCOUNTER — ANNUAL EXAM (OUTPATIENT)
Dept: OBGYN CLINIC | Facility: CLINIC | Age: 43
End: 2023-03-28

## 2023-03-28 VITALS
HEIGHT: 68 IN | BODY MASS INDEX: 37.89 KG/M2 | WEIGHT: 250 LBS | DIASTOLIC BLOOD PRESSURE: 85 MMHG | RESPIRATION RATE: 18 BRPM | SYSTOLIC BLOOD PRESSURE: 128 MMHG | HEART RATE: 86 BPM

## 2023-03-28 DIAGNOSIS — Z01.419 ENCOUNTER FOR GYNECOLOGICAL EXAMINATION WITHOUT ABNORMAL FINDING: Primary | ICD-10-CM

## 2023-03-28 DIAGNOSIS — Z12.31 ENCOUNTER FOR SCREENING MAMMOGRAM FOR MALIGNANT NEOPLASM OF BREAST: ICD-10-CM

## 2023-03-28 PROBLEM — B37.31 VAGINAL YEAST INFECTION: Status: RESOLVED | Noted: 2022-02-15 | Resolved: 2023-03-28

## 2023-06-13 ENCOUNTER — TELEPHONE (OUTPATIENT)
Dept: ENDOCRINOLOGY | Facility: CLINIC | Age: 43
End: 2023-06-13

## 2023-06-13 ENCOUNTER — APPOINTMENT (OUTPATIENT)
Dept: LAB | Facility: CLINIC | Age: 43
End: 2023-06-13
Payer: COMMERCIAL

## 2023-06-13 DIAGNOSIS — E89.2 HISTORY OF PARATHYROIDECTOMY (HCC): ICD-10-CM

## 2023-06-13 LAB
T4 FREE SERPL-MCNC: 0.25 NG/DL (ref 0.61–1.12)
TSH SERPL DL<=0.05 MIU/L-ACNC: 108.26 UIU/ML (ref 0.45–4.5)

## 2023-06-13 PROCEDURE — 84439 ASSAY OF FREE THYROXINE: CPT

## 2023-06-13 PROCEDURE — 84443 ASSAY THYROID STIM HORMONE: CPT

## 2023-06-13 PROCEDURE — 36415 COLL VENOUS BLD VENIPUNCTURE: CPT

## 2023-06-13 NOTE — TELEPHONE ENCOUNTER
She was last seen in the office in March 2022, at that time her thyroid function was stable, she was taking Synthroid 150 mcg 1 tablet daily from Monday through Saturday and 1 5 tablet on Sunday  Blood work results reviewed    TSH is 108, free T4 is 0 25 suggestive of uncontrolled hypothyroidism  Patient , got answering machine and left message about giving us call back and take medication regularly as suggested  Most likely patient has missed doses  As previously on Synthroid about dose her thyroid blood work was normal     Please inform patient, about abnormal blood work  Also please verify if she is missing any doses, she is missing doses she should take Synthroid as above regularly without missing doses  Patient will also need appointment with us for follow-up as soon as possible    If she is feeling extremely fatigued tired, and not to herself she should go to the ER, has uncontrolled hypothyroidism can cause myxedema  Skyler Garza

## 2023-06-13 NOTE — LETTER
"Roseanne 15, 2023    Erin Garcia  [address] [address]      Dear Ms Phillips Vladwhitney: The office has made several attempts to reach you regarding you abnormal lab results with no response  Please call us back ASAP to discuss and see Dr Leonora Parisi message below --    Roseanne Gage was last seen in the office in March 2022, at that time her thyroid function was stable  She was taking Synthroid 150 mcg 1 tablet daily from Monday through Saturday and 1 5 tablet on Sunday  Blood work results reviewed:  TSH is 108, free T4 is 0 25 suggestive of uncontrolled hypothyroidism  Called patient and got answering machine  Left message about giving us call back and take medication regularly as suggested  Most likely the patient has missed doses since previously on Synthroid and her thyroid blood work was normal      Please inform patient, about abnormal blood work --  Also please verify if she is missing any doses  If she is missing doses she should take Synthroid as above regularly without missing doses  Patient will also need appointment with us for follow-up as soon as possible  If she is feeling extremely fatigued tired, and not to herself she should go to the ER, has uncontrolled hypothyroidism can cause myxedema  \"    García Coronado    Sincerely,      [facility]        CC: No Recipients  "

## 2023-06-14 ENCOUNTER — OFFICE VISIT (OUTPATIENT)
Dept: FAMILY MEDICINE CLINIC | Facility: CLINIC | Age: 43
End: 2023-06-14
Payer: COMMERCIAL

## 2023-06-14 VITALS
OXYGEN SATURATION: 97 % | TEMPERATURE: 98 F | BODY MASS INDEX: 39.4 KG/M2 | HEART RATE: 92 BPM | DIASTOLIC BLOOD PRESSURE: 80 MMHG | HEIGHT: 68 IN | WEIGHT: 260 LBS | SYSTOLIC BLOOD PRESSURE: 110 MMHG | RESPIRATION RATE: 16 BRPM

## 2023-06-14 DIAGNOSIS — E89.0 POSTOPERATIVE HYPOTHYROIDISM: ICD-10-CM

## 2023-06-14 DIAGNOSIS — E89.2 HISTORY OF PARATHYROIDECTOMY (HCC): ICD-10-CM

## 2023-06-14 PROCEDURE — 99214 OFFICE O/P EST MOD 30 MIN: CPT

## 2023-06-14 RX ORDER — LEVOTHYROXINE SODIUM 150 MCG
TABLET ORAL
Qty: 98 TABLET | Refills: 1 | Status: SHIPPED | OUTPATIENT
Start: 2023-06-14

## 2023-06-14 NOTE — ASSESSMENT & PLAN NOTE
Pt is taking calcium carbonate 1200 mg bid and Vit D 3 2000 units daily, encouraged to f/u with endocrinology appointments

## 2023-06-14 NOTE — ASSESSMENT & PLAN NOTE
Last labs 6/13 indicates uncontrolled hypothyroidism  Pt has not been taking prescribed levothyroxine 150 mcg since February or f/u with endocrinology  Pt resumed medication 2 days however needs refill as earliest appt with her endocrinologist is in 3 mths  Strongly counseled on adhering to medication, discussed sxs of myxedema and advised to go to ER for worsening fatigue, lethargy, changes in mental status   Will refer to new endocrinologist as requested and check TSH in 6 weeks

## 2023-06-14 NOTE — PROGRESS NOTES
Name: Gavi Wilson      : 1980      MRN: [id number]  Encounter Provider: LOVELY Sherman  Encounter Date: 2023   Encounter department: [address] [place] [address] MEDICINE    Assessment & Plan     1  History of parathyroidectomy [facility])  Assessment & Plan:  Pt is taking calcium carbonate 1200 mg bid and Vit D 3 2000 units daily, encouraged to f/u with endocrinology appointments  Orders:  -     Ambulatory Referral to Endocrinology; Future    2  Postoperative hypothyroidism  Assessment & Plan:  Last labs  indicates uncontrolled hypothyroidism  Pt has not been taking prescribed levothyroxine 150 mcg since February or f/u with endocrinology  Pt resumed medication 2 days however needs refill as earliest appt with her endocrinologist is in 3 mths  Strongly counseled on adhering to medication, discussed sxs of myxedema and advised to go to ER for worsening fatigue, lethargy, changes in mental status   Will refer to new endocrinologist as requested and check TSH in 6 weeks  Orders:  -     Synthroid 150 MCG tablet; Take 1 tablet daily Mon-Sat and 1 5 tablets on   Brand necessary  -     Ambulatory Referral to Endocrinology; Future  -     TSH, 3rd generation with Free T4 reflex; Future; Expected date: 2023         Subjective      Pt presents for f/u hypothyroidism she is prescribed synthroid 150 mcg Mon - sat and 1 5 tabs on  and was foll with  endocrinology  Pt reports she has not been taking her medications since 2023, would like to see if PCP can manage her hypothyroidism  Most recent blood work shows TSH of 108 and free T4 0 25 and states earliest appt to see her endocrinologist is in 3 mths  Advised her hypothyroidism is not controlled therefore would recommend having endocrinology on board, will provide referral for a new endocrinologist and counseled on adhering to medication  Review of Systems   Constitutional: Positive for fatigue  Negative for activity change, chills, diaphoresis and fever  HENT: Negative for congestion  Eyes: Negative for visual disturbance  Respiratory: Negative for cough, chest tightness, shortness of breath and wheezing  Cardiovascular: Positive for leg swelling (b/l lower extremity)  Negative for chest pain and palpitations  Gastrointestinal: Negative for abdominal pain, diarrhea, nausea and vomiting  Endocrine: Positive for cold intolerance  Genitourinary: Negative for frequency  Musculoskeletal: Positive for myalgias  Negative for arthralgias and joint swelling  Skin: Negative for color change  Allergic/Immunologic: Negative for environmental allergies  Neurological: Positive for numbness (b/l hands)  Negative for dizziness, tremors, seizures, syncope, facial asymmetry, speech difficulty, weakness, light-headedness and headaches  Hematological: Negative for adenopathy  Psychiatric/Behavioral: Negative for agitation, confusion, decreased concentration and sleep disturbance  The patient is not nervous/anxious  Current Outpatient Medications on File Prior to Visit   Medication Sig   • calcitriol (ROCALTROL) 0 25 mcg capsule Take one tablet with dinner   • calcium carbonate (OS-DENNIS) 600 MG tablet Take 2 tablets (1,200 mg total) by mouth 2 (two) times a day with meals   • Cholecalciferol (Vitamin D3) 50 MCG (2000 UT) capsule Take 5000 IU daily   • [DISCONTINUED] Synthroid 150 MCG tablet TAKE 1 TABLET DAILY MON-SAT AND 1 5 TABLETS ON SUNDAY   BRAND NECESSARY   • hydrOXYzine HCL (ATARAX) 50 mg tablet Take 1 tablet (50 mg total) by mouth every 6 (six) hours as needed for itching (Patient not taking: Reported on 11/3/2021)   • montelukast (SINGULAIR) 10 mg tablet TAKE 1 TABLET BY MOUTH DAILY AT BEDTIME (Patient not taking: Reported on 6/14/2023)       Objective     /80 (BP Location: Left arm, Patient Position: Sitting, Cuff Size: Large)   Pulse 92   Temp 98 °F (36 7 °C) (Tympanic) "Resp 16   Ht 5' 8\" (1 727 m)   Wt 118 kg (260 lb)   SpO2 97%   BMI 39 53 kg/m²     Physical Exam  Vitals and nursing note reviewed  Constitutional:       General: She is not in acute distress  Appearance: Normal appearance  She is well-developed  She is obese  She is not ill-appearing, toxic-appearing or diaphoretic  HENT:      Head: Normocephalic and atraumatic  Right Ear: Tympanic membrane, ear canal and external ear normal  There is no impacted cerumen  Left Ear: Tympanic membrane, ear canal and external ear normal  There is no impacted cerumen  Nose: Nose normal  No congestion or rhinorrhea  Mouth/Throat:      Mouth: Mucous membranes are moist       Pharynx: No oropharyngeal exudate or posterior oropharyngeal erythema  Eyes:      General: Lids are normal  Lids are everted, no foreign bodies appreciated  Right eye: No discharge  Left eye: No discharge  Extraocular Movements: Extraocular movements intact  Right eye: Normal extraocular motion and no nystagmus  Left eye: Normal extraocular motion and no nystagmus  Conjunctiva/sclera: Conjunctivae normal       Pupils: Pupils are equal, round, and reactive to light  Neck:      Thyroid: No thyroid mass, thyromegaly or thyroid tenderness  Vascular: Normal carotid pulses  No carotid bruit  Trachea: Trachea normal    Cardiovascular:      Rate and Rhythm: Normal rate and regular rhythm  Pulses: Normal pulses  Heart sounds: Normal heart sounds  No murmur heard  Pulmonary:      Effort: Pulmonary effort is normal  No respiratory distress  Breath sounds: Normal breath sounds  No wheezing  Chest:      Chest wall: No tenderness  Abdominal:      General: Bowel sounds are normal  There is no distension  Palpations: Abdomen is soft  There is no mass  Tenderness: There is no abdominal tenderness  There is no right CVA tenderness or left CVA tenderness     Musculoskeletal:   " General: No swelling, tenderness, deformity or signs of injury  Normal range of motion  Cervical back: Full passive range of motion without pain and normal range of motion  No rigidity or tenderness  No pain with movement or muscular tenderness  Normal range of motion  Right lower leg: No deformity or tenderness  1+ Edema present  Left lower leg: No deformity or tenderness  1+ Edema present  Lymphadenopathy:      Cervical: No cervical adenopathy  Skin:     General: Skin is warm  Capillary Refill: Capillary refill takes less than 2 seconds  Coloration: Skin is not jaundiced  Findings: No bruising, erythema or rash  Neurological:      General: No focal deficit present  Mental Status: She is alert and oriented to person, place, and time  Cranial Nerves: No cranial nerve deficit  Sensory: No sensory deficit  Motor: No weakness  Coordination: Coordination normal       Gait: Gait normal       Deep Tendon Reflexes: Reflexes normal    Psychiatric:         Attention and Perception: Attention and perception normal          Mood and Affect: Mood and affect normal  Mood is not anxious or depressed  Speech: Speech normal          Behavior: Behavior normal  Behavior is cooperative  Thought Content: Thought content normal  Thought content is not paranoid or delusional  Thought content does not include homicidal or suicidal ideation  Thought content does not include homicidal or suicidal plan           Cognition and Memory: Cognition and memory normal          Judgment: Judgment normal        LOVELY Avery

## 2023-06-15 NOTE — TELEPHONE ENCOUNTER
Pt called back  She mentioned that her PCP is managing her thyroid for now until she establishes care w/ new endo  She confirmed she had not been taking her medication  She said thanks for the call, but PCP will manage      Thanks

## 2023-06-16 ENCOUNTER — CONSULT (OUTPATIENT)
Dept: ENDOCRINOLOGY | Facility: CLINIC | Age: 43
End: 2023-06-16
Payer: COMMERCIAL

## 2023-06-16 VITALS
HEIGHT: 68 IN | TEMPERATURE: 98.3 F | OXYGEN SATURATION: 99 % | SYSTOLIC BLOOD PRESSURE: 108 MMHG | DIASTOLIC BLOOD PRESSURE: 82 MMHG | HEART RATE: 66 BPM | BODY MASS INDEX: 39.4 KG/M2 | WEIGHT: 260 LBS

## 2023-06-16 DIAGNOSIS — E20.9 HYPOPARATHYROIDISM, UNSPECIFIED HYPOPARATHYROIDISM TYPE (HCC): ICD-10-CM

## 2023-06-16 DIAGNOSIS — E89.0 POSTOPERATIVE HYPOTHYROIDISM: Primary | ICD-10-CM

## 2023-06-16 DIAGNOSIS — E55.9 VITAMIN D DEFICIENCY: ICD-10-CM

## 2023-06-16 DIAGNOSIS — E89.2 HISTORY OF PARATHYROIDECTOMY (HCC): ICD-10-CM

## 2023-06-16 DIAGNOSIS — R53.83 OTHER FATIGUE: ICD-10-CM

## 2023-06-16 PROCEDURE — 99215 OFFICE O/P EST HI 40 MIN: CPT | Performed by: INTERNAL MEDICINE

## 2023-06-16 NOTE — PATIENT INSTRUCTIONS
Instructions    Diet:   Take 1500 cori/day of balanced diet with 1/3rd carbs, 1/3rd protein and rest fiber and small amount fat  Try to include fasting for 12-16hrs -early dinner and late breakfast   Drink at least 64 oz fluids daily  Lifestyle:   30 min brisk activity most days  Join [employer] fitness training to build muscles and burn fat  Medical fitness: follow these exercise links  Full Version - [email or web address]     Warmup - [email or web address]     Lower Body - [email or web address]     Upper Body - [email or web address]     Core -  [email or web address]    Medications: look up Wegovy, Victoza - weight loss meds  Consider switching from medications that cause weight gain to weight neutral medications  Other:   Make sure you are treated appropriately if you have sleep apnea  We may consider bariatric surgery if we dont see benefit over 6-12 months

## 2023-06-16 NOTE — PROGRESS NOTES
Follow-up Patient Progress Note      CC: Postoperative hypothyroidism, obesity    History of Present Illness:   60-year-old female with hypothyroidism since age 22, s/p total thyroidectomy 2015 [large multinodular goiter with obstructive symptoms  Surgical pathology was benign but showed 3 parathyroid glands], hypoparathyroidism, obesity, vitamin D deficiency  Last visit was 3/16/2022 with Dr Miya Short  She was originally diagnosed with Hashimoto's thyroiditis related hypothyroidism age 22, she was noncompliant with the levothyroxine therapy and therefore developed goiter with multiple nodules  She therefore then underwent total thyroidectomy in 2015  Postsurgery she developed hypoparathyroidism  She stopped all medications since 2/2023 due to life issues  Current meds: all started last week    Brand synthroid 150mcg 1 tab daily M-S and Sunday 2 tabs  Calcitriol 0 25mcg daily  Calcium 1000mg tidwm    Menstrual Hx: regular until 2 months ago    Patient Active Problem List   Diagnosis   • Hypocalcemia   • Postoperative hypothyroidism   • Vitamin D deficiency   • History of parathyroidectomy ([facility] [address] )   • Vaginal discharge   • High risk sexual behavior   • Encounter for screening mammogram for malignant neoplasm of breast   • Encounter for gynecological examination without abnormal finding   • Urticaria   • BMI 37 0-37 9, adult   • Annual physical exam     Past Medical History:   Diagnosis Date   • Anemia    • H/O total thyroidectomy 10/2016   • History of parathyroidectomy ([address] ) 10/2015   • Hypothyroidism       Past Surgical History:   Procedure Laterality Date   • PARATHYROIDECTOMY  10/2015   • TOTAL THYROIDECTOMY  10/2015      Family History   Problem Relation Age of Onset   • Hypothyroidism Mother    • Lupus Mother    • Thyroid disease Sister    • Thyroid disease Sister    • Thyroid disease Sister    • Hypothyroidism Maternal Grandmother    • Diabetes type II Maternal Grandmother    • Lupus Maternal "Grandmother    • Breast cancer Family    • Colon cancer Neg Hx    • Ovarian cancer Neg Hx      Social History     Tobacco Use   • Smoking status: Former     Packs/day: 0 25     Types: Cigarettes   • Smokeless tobacco: Never   • Tobacco comments:     Quit in 2020   Substance Use Topics   • Alcohol use: Never     Allergies   Allergen Reactions   • Latex      Other reaction(s): swollen sore sensation         Current Outpatient Medications:   •  calcitriol (ROCALTROL) 0 25 mcg capsule, Take one tablet with dinner, Disp: 90 capsule, Rfl: 1  •  calcium carbonate (OS-DENNIS) 600 MG tablet, Take 2 tablets (1,200 mg total) by mouth 2 (two) times a day with meals, Disp: , Rfl:   •  Cholecalciferol (Vitamin D3) 50 MCG (2000 UT) capsule, Take 5000 IU daily, Disp: , Rfl: 0  •  Synthroid 150 MCG tablet, Take 1 tablet daily Mon-Sat and 1 5 tablets on Sunday  Brand necessary, Disp: 98 tablet, Rfl: 1  •  hydrOXYzine HCL (ATARAX) 50 mg tablet, Take 1 tablet (50 mg total) by mouth every 6 (six) hours as needed for itching (Patient not taking: Reported on 11/3/2021), Disp: 15 tablet, Rfl: 0  •  montelukast (SINGULAIR) 10 mg tablet, TAKE 1 TABLET BY MOUTH DAILY AT BEDTIME (Patient not taking: Reported on 6/14/2023), Disp: 90 tablet, Rfl: 1    Review of Systems   HENT: Negative  Eyes: Negative  Respiratory: Negative  Cardiovascular: Negative  Gastrointestinal: Negative  Endocrine: Negative  Musculoskeletal: Negative  Skin: Negative  Allergic/Immunologic: Negative  Neurological: Negative  Hematological: Negative  Psychiatric/Behavioral: Negative  Physical Exam:  Body mass index is 39 53 kg/m²    /82 (BP Location: Left arm, Patient Position: Sitting, Cuff Size: Large)   Pulse 66   Temp 98 3 °F (36 8 °C) (Tympanic)   Ht 5' 8\" (1 727 m)   Wt 118 kg (260 lb)   SpO2 99%   BMI 39 53 kg/m²    Vitals:    06/16/23 0849   Weight: 118 kg (260 lb)        Physical Exam  Constitutional:       General: She " is not in acute distress  Appearance: She is well-developed  She is not ill-appearing  HENT:      Head: Normocephalic and atraumatic  Nose: Nose normal       Mouth/Throat:      Pharynx: Oropharynx is clear  Eyes:      Extraocular Movements: Extraocular movements intact  Conjunctiva/sclera: Conjunctivae normal    Neck:      Thyroid: No thyromegaly  Cardiovascular:      Rate and Rhythm: Normal rate  Pulmonary:      Effort: Pulmonary effort is normal    Musculoskeletal:         General: No deformity  Cervical back: Normal range of motion  Skin:     Capillary Refill: Capillary refill takes less than 2 seconds  Coloration: Skin is not pale  Findings: No rash  Neurological:      Mental Status: She is alert and oriented to person, place, and time  Psychiatric:         Behavior: Behavior normal          Labs:   Lab Results   Component Value Date    HGBA1C 5 6 10/22/2022       Lab Results   Component Value Date    CUG4DWQHQWFG 108 257 (H) 06/13/2023       Lab Results   Component Value Date    CREATININE 0 88 10/22/2022    CREATININE 0 85 03/15/2022    CREATININE 1 12 11/10/2021    BUN 13 10/22/2022     11/26/2015    K 3 8 10/22/2022     10/22/2022    CO2 23 10/22/2022     eGFR   Date Value Ref Range Status   10/22/2022 80 ml/min/1 73sq m Final       Lab Results   Component Value Date    ALT 52 11/10/2021    AST 30 11/10/2021    ALKPHOS 79 11/10/2021       Lab Results   Component Value Date    CHOLESTEROL 217 (H) 10/22/2022     Lab Results   Component Value Date    HDL 57 10/22/2022     Lab Results   Component Value Date    TRIG 202 (H) 10/22/2022     Lab Results   Component Value Date    NONHDLC 160 10/22/2022         Impression:  1  Postoperative hypothyroidism    2  History of parathyroidectomy ([address] )    3  Vitamin D deficiency    4  BMI 37 0-37 9, adult    5   Hypoparathyroidism, unspecified hypoparathyroidism type [facility])         Plan:    Denise Mckeon was seen today for advice only  Diagnoses and all orders for this visit:    Postoperative hypothyroidism  She is currently on brand Synthroid 150 mcg Monday through Saturday and 1 5 tablets on Sunday  She is clinically and biochemically hypothyroid  Today we discussed options and agreed to use brand Synthroid 150 mcg 1 tablet Monday through Saturday and 2 tablets Sunday  Weight-based dose should be approximately 150 to 175 mcg/day  Repeat labs in 6 weeks prior to next follow-up  -     Ambulatory Referral to Endocrinology  -     T4, free; Future  -     TSH, 3rd generation; Future    History of parathyroidectomy ([facility] )  -     Ambulatory Referral to Endocrinology    Vitamin D deficiency  -     Vitamin D 25 hydroxy; Future    BMI 37 0-37 9, adult  She is concerned about obesity  This is multifactorial  Today we discussed all aspects of obesity and metabolism including pathophysiology, risk factors, complications, goal of sustaining weight loss long term, usual propensity to regain weight with short term approaches, follow up needs and medications - efficacy and limitations  Discussed role of endocrinopathies, inflammatory conditions, sleep disorders, mental health disorders, lifestyle issues and polypharmacy and weight gain  Briefly discussed bariatric surgery  Diet: carb controlled diet <1500cal/day/ VLCD, 64oz fluids/day   lifestyle modifications: intermittent fasting and 10,000 steps/day  medical fitness training: muscle building  education: nutrition input    We may consider a GLP-1 agonist in future  Hypoparathyroidism, unspecified hypoparathyroidism type ([facility] )  She has postsurgical hypoparathyroidism associated with hypocalcemia  Recent past calcium was 7 7 mg/dL  Goal is greater than 8 mg/dL  He does report some occasional cramps typically with exercise  Last PTH in 2021 was undetectable      Today we discussed the importance of monitoring and treating for hypocalcemia associated with hypoparathyroidism  Advised to continue current dose of calcium approximately 3 g/day in divided doses  Repeat labs in 6 weeks prior to next visit  -     Comprehensive metabolic panel; Future  -     PTH, intact; Future  -     Phosphorus; Future  -     Vitamin D 1,25 dihydroxy; Future    Other fatigue  Today we discussed the typical multifactorial etiology of fatigue including endocrinopathies, connective tissue disorders/rheumatologic conditions, metabolic illness, nutritional deficiencies, mental health illness, sleep disorder, infectious etiologies, disorders of the reticuloendothelial system and other chronic health issues  We will address these issues further once we normalize her thyroid functions as well as calcium levels  -     Iron Panel (Includes Ferritin, Iron Sat%, Iron, and TIBC); Future  -     Celiac Disease Antibody Profile; Future        I have spent 45 minutes on this patient's care today in which greater than 50% of this time was spent in counseling/coordination of care for this patient with complicated medical issues which have been lost to follow-up over the last 1 year and she is seeing me for the first time  Discussed with the patient and all questioned fully answered  She will call me if any problems arise  Educated/ Counseled patient on diagnostic test results, prognosis, risk vs benefit of treatment options, importance of treatment compliance, healthy life and lifestyle choices        [address]

## 2023-07-20 ENCOUNTER — HOSPITAL ENCOUNTER (OUTPATIENT)
Dept: MAMMOGRAPHY | Facility: HOSPITAL | Age: 43
Discharge: HOME | End: 2023-07-20
Payer: COMMERCIAL

## 2023-07-20 VITALS — WEIGHT: 260.14 LBS | BODY MASS INDEX: 39.43 KG/M2 | HEIGHT: 68 IN

## 2023-07-20 DIAGNOSIS — Z12.31 ENCOUNTER FOR SCREENING MAMMOGRAM FOR MALIGNANT NEOPLASM OF BREAST: ICD-10-CM

## 2023-07-20 PROCEDURE — 77067 SCR MAMMO BI INCL CAD: CPT

## 2023-07-20 PROCEDURE — 77063 BREAST TOMOSYNTHESIS BI: CPT

## 2023-07-24 ENCOUNTER — TELEPHONE (OUTPATIENT)
Dept: OBGYN CLINIC | Facility: CLINIC | Age: 43
End: 2023-07-24

## 2023-07-24 NOTE — TELEPHONE ENCOUNTER
----- Message from Marnell Aase, [address] [place] [address] sent at 7/24/2023  1:46 PM EDT -----  Please inform pt that her mammogram was negative, recommend next screening in 1 year. Thank You!

## 2023-07-24 NOTE — TELEPHONE ENCOUNTER
Called and spoke to patient, informed her of the test results and recommendations.  Patient verbalized understanding, no questions or concerns,

## 2023-08-01 ENCOUNTER — OFFICE VISIT (OUTPATIENT)
Dept: OBGYN CLINIC | Facility: CLINIC | Age: 43
End: 2023-08-01

## 2023-08-01 ENCOUNTER — APPOINTMENT (OUTPATIENT)
Dept: LAB | Facility: CLINIC | Age: 43
End: 2023-08-01
Payer: COMMERCIAL

## 2023-08-01 VITALS
SYSTOLIC BLOOD PRESSURE: 122 MMHG | BODY MASS INDEX: 38.18 KG/M2 | DIASTOLIC BLOOD PRESSURE: 88 MMHG | WEIGHT: 251.1 LBS | RESPIRATION RATE: 16 BRPM | HEART RATE: 72 BPM

## 2023-08-01 DIAGNOSIS — E20.9 HYPOPARATHYROIDISM, UNSPECIFIED HYPOPARATHYROIDISM TYPE (HCC): ICD-10-CM

## 2023-08-01 DIAGNOSIS — E55.9 VITAMIN D DEFICIENCY: ICD-10-CM

## 2023-08-01 DIAGNOSIS — R53.83 OTHER FATIGUE: ICD-10-CM

## 2023-08-01 DIAGNOSIS — E89.0 POSTOPERATIVE HYPOTHYROIDISM: ICD-10-CM

## 2023-08-01 DIAGNOSIS — N90.89 VULVAR LESION: Primary | ICD-10-CM

## 2023-08-01 LAB
25(OH)D3 SERPL-MCNC: 21.6 NG/ML (ref 30–100)
ALBUMIN SERPL BCP-MCNC: 3.9 G/DL (ref 3.5–5)
ALP SERPL-CCNC: 69 U/L (ref 34–104)
ALT SERPL W P-5'-P-CCNC: 14 U/L (ref 7–52)
ANION GAP SERPL CALCULATED.3IONS-SCNC: 7 MMOL/L
AST SERPL W P-5'-P-CCNC: 12 U/L (ref 13–39)
BILIRUB SERPL-MCNC: 0.32 MG/DL (ref 0.2–1)
BUN SERPL-MCNC: 22 MG/DL (ref 5–25)
CALCIUM SERPL-MCNC: 6.8 MG/DL (ref 8.4–10.2)
CHLORIDE SERPL-SCNC: 107 MMOL/L (ref 96–108)
CO2 SERPL-SCNC: 24 MMOL/L (ref 21–32)
CREAT SERPL-MCNC: 1.02 MG/DL (ref 0.6–1.3)
FERRITIN SERPL-MCNC: 15 NG/ML (ref 11–307)
GFR SERPL CREATININE-BSD FRML MDRD: 67 ML/MIN/1.73SQ M
GLUCOSE P FAST SERPL-MCNC: 134 MG/DL (ref 65–99)
IRON SATN MFR SERPL: 24 % (ref 15–50)
IRON SERPL-MCNC: 76 UG/DL (ref 50–170)
PHOSPHATE SERPL-MCNC: 4.3 MG/DL (ref 2.7–4.5)
POTASSIUM SERPL-SCNC: 4 MMOL/L (ref 3.5–5.3)
PROT SERPL-MCNC: 6.6 G/DL (ref 6.4–8.4)
PTH-INTACT SERPL-MCNC: 3.8 PG/ML (ref 12–88)
SODIUM SERPL-SCNC: 138 MMOL/L (ref 135–147)
T4 FREE SERPL-MCNC: 1.03 NG/DL (ref 0.61–1.12)
T4 FREE SERPL-MCNC: 1.07 NG/DL (ref 0.61–1.12)
TIBC SERPL-MCNC: 315 UG/DL (ref 250–450)
TSH SERPL DL<=0.05 MIU/L-ACNC: 0.07 UIU/ML (ref 0.45–4.5)

## 2023-08-01 PROCEDURE — 36415 COLL VENOUS BLD VENIPUNCTURE: CPT

## 2023-08-01 PROCEDURE — 99213 OFFICE O/P EST LOW 20 MIN: CPT | Performed by: OBSTETRICS & GYNECOLOGY

## 2023-08-01 PROCEDURE — 83540 ASSAY OF IRON: CPT

## 2023-08-01 PROCEDURE — 80053 COMPREHEN METABOLIC PANEL: CPT

## 2023-08-01 PROCEDURE — 84439 ASSAY OF FREE THYROXINE: CPT

## 2023-08-01 PROCEDURE — 82652 VIT D 1 25-DIHYDROXY: CPT

## 2023-08-01 PROCEDURE — 84100 ASSAY OF PHOSPHORUS: CPT

## 2023-08-01 PROCEDURE — 83970 ASSAY OF PARATHORMONE: CPT

## 2023-08-01 PROCEDURE — 82784 ASSAY IGA/IGD/IGG/IGM EACH: CPT

## 2023-08-01 PROCEDURE — 84443 ASSAY THYROID STIM HORMONE: CPT

## 2023-08-01 PROCEDURE — 86231 EMA EACH IG CLASS: CPT

## 2023-08-01 PROCEDURE — 82728 ASSAY OF FERRITIN: CPT

## 2023-08-01 PROCEDURE — 86364 TISS TRNSGLTMNASE EA IG CLAS: CPT

## 2023-08-01 PROCEDURE — 86258 DGP ANTIBODY EACH IG CLASS: CPT

## 2023-08-01 PROCEDURE — 83550 IRON BINDING TEST: CPT

## 2023-08-01 PROCEDURE — 82306 VITAMIN D 25 HYDROXY: CPT

## 2023-08-01 NOTE — PROGRESS NOTES
OB/GYN VISIT  Alexsandra Ibarra  2023  12:57 PM      Assessment/Plan:    Alexsandra Ibarra is a 39 y.o.  female with a small, benign vulvar lesion. Discussed with patient that removal of this lesion at this time has more risks than benefit given that she is not currently having any symptoms. Risks include bleeding and infection, especially given location and a highly colonized area. Reassured patient once again that this is most likely not a lesion caused by an STD. Advised patient to use barrier protection during intercourse. Subjective:     Alexsandra Ibarra is a 39 y.o.  female who presents for evaluation of "vaginal bump." She reports she has had it for many months and has been seen previously for it and reassured that it is not an STD. She denies any pain, burning, itching, change in size. She reports it feels like the calcium deposits that she has had elsewhere in her body. She "just doesn't want it anymore." She is currently single and is conscious of a potential partner being afraid of the lesion being an STD. Objective:    Vitals: Blood pressure 122/88, pulse 72, resp. rate 16, weight 114 kg (251 lb 1.7 oz), last menstrual period 2023, not currently breastfeeding. Body mass index is 38.18 kg/m². Past Medical History:   Diagnosis Date   • Anemia    • H/O total thyroidectomy 10/2016   • History of parathyroidectomy ([address]) 10/2015   • Hypothyroidism      Past Surgical History:   Procedure Laterality Date   • PARATHYROIDECTOMY  10/2015   • TOTAL THYROIDECTOMY  10/2015       Physical Exam  Constitutional:       Appearance: She is obese. HENT:      Head: Normocephalic. Eyes:      Conjunctiva/sclera: Conjunctivae normal.   Cardiovascular:      Rate and Rhythm: Normal rate. Pulses: Normal pulses. Pulmonary:      Effort: Pulmonary effort is normal.   Abdominal:      Palpations: Abdomen is soft. Tenderness: There is no abdominal tenderness.    Genitourinary:     Comments: Small, slightly raised, hypopigmented, 0.5 cm x 0.5 cm lesion in the midline posterior to clitoris. No tenderness to palpation, erythema, induration, warmth  Skin:     General: Skin is warm. Neurological:      Mental Status: She is alert and oriented to person, place, and time.    Psychiatric:         Mood and Affect: Mood normal.         Usman Gaona MD  8/1/2023  12:57 PM

## 2023-08-02 LAB
ENDOMYSIUM IGA SER QL: NEGATIVE
GLIADIN PEPTIDE IGA SER-ACNC: 5 UNITS (ref 0–19)
GLIADIN PEPTIDE IGG SER-ACNC: 2 UNITS (ref 0–19)
IGA SERPL-MCNC: 166 MG/DL (ref 87–352)
TTG IGA SER-ACNC: <2 U/ML (ref 0–3)
TTG IGG SER-ACNC: <2 U/ML (ref 0–5)

## 2023-08-03 ENCOUNTER — TELEMEDICINE (OUTPATIENT)
Dept: ENDOCRINOLOGY | Facility: CLINIC | Age: 43
End: 2023-08-03
Payer: COMMERCIAL

## 2023-08-03 DIAGNOSIS — E83.51 HYPOCALCEMIA: ICD-10-CM

## 2023-08-03 DIAGNOSIS — E55.9 VITAMIN D DEFICIENCY: ICD-10-CM

## 2023-08-03 DIAGNOSIS — E20.9 HYPOPARATHYROIDISM, UNSPECIFIED HYPOPARATHYROIDISM TYPE (HCC): ICD-10-CM

## 2023-08-03 DIAGNOSIS — E61.1 IRON DEFICIENCY: ICD-10-CM

## 2023-08-03 DIAGNOSIS — E89.0 POSTOPERATIVE HYPOTHYROIDISM: Primary | ICD-10-CM

## 2023-08-03 LAB — 1,25(OH)2D3 SERPL-MCNC: 13.1 PG/ML (ref 24.8–81.5)

## 2023-08-03 PROCEDURE — 99214 OFFICE O/P EST MOD 30 MIN: CPT | Performed by: INTERNAL MEDICINE

## 2023-08-03 RX ORDER — CALCITRIOL 0.25 UG/1
0.25 CAPSULE, LIQUID FILLED ORAL 2 TIMES DAILY
Qty: 90 CAPSULE | Refills: 1 | Status: SHIPPED | OUTPATIENT
Start: 2023-08-03

## 2023-08-03 RX ORDER — ERGOCALCIFEROL 1.25 MG/1
50000 CAPSULE ORAL WEEKLY
Qty: 12 CAPSULE | Refills: 0 | Status: SHIPPED | OUTPATIENT
Start: 2023-08-03 | End: 2023-10-20

## 2023-08-03 NOTE — PROGRESS NOTES
REQUIRED DOCUMENTATION:      1. This service was provided via Telemedicine. 2. Provider located at [place], [place]. 3. TeleMed provider: Mingo Rinaldi MD.  4. Identify all parties in room with patient during tele consult:  The patient, advanced practitioner and bedside nurse  5. Patient was then informed that this was a Telemedicine visit and that the exam was being conducted confidentially over secure lines. My office door was closed. No one else was in the room. Patient acknowledged consent and understanding of privacy and security of the Telemedicine visit, and gave us permission to have the assistant stay in the room in order to assist with the history and to conduct the exam.  I informed the patient that I have reviewed their record in Epic and presented the opportunity for them to ask any questions regarding the visit today. The patient agreed to participate. Patient is aware this is a billable service. Follow-up Patient Progress Note      CC: Postoperative hypothyroidism, obesity     History of Present Illness:   48-year-old female with hypothyroidism since age 22, s/p total thyroidectomy 2015 [large multinodular goiter with obstructive symptoms] [surgical pathology was benign but showed 3 parathyroid glands], post operative hypoparathyroidism, obesity, vitamin D deficiency. Last visit was  6/16/2023.     Since last visit she lost 9 lbs.     She stopped all medications since 2/2023 due to life issues.     Current meds: all started last week.   Brand synthroid 150mcg 1 tab daily M-S and Sunday 2 tabs  Calcitriol 0.25mcg daily  Calcium 1000mg tidwm     Menstrual Hx: regular until 2 months ago    Patient Active Problem List   Diagnosis   • Hypocalcemia   • Postoperative hypothyroidism   • Vitamin D deficiency   • History of parathyroidectomy ([address])   • Vaginal discharge   • High risk sexual behavior   • Encounter for screening mammogram for malignant neoplasm of breast   • Encounter for gynecological examination without abnormal finding   • Urticaria   • BMI 37.0-37.9, adult   • Annual physical exam     Past Medical History:   Diagnosis Date   • Anemia    • H/O total thyroidectomy 10/2016   • History of parathyroidectomy ([address]) 10/2015   • Hypothyroidism       Past Surgical History:   Procedure Laterality Date   • PARATHYROIDECTOMY  10/2015   • TOTAL THYROIDECTOMY  10/2015      Family History   Problem Relation Age of Onset   • Hypothyroidism Mother    • Lupus Mother    • No Known Problems Father    • Thyroid disease Sister    • Thyroid disease Sister    • Thyroid disease Sister    • No Known Problems Sister    • Hypothyroidism Maternal Grandmother    • Diabetes type II Maternal Grandmother    • Lupus Maternal Grandmother    • Breast cancer Family    • No Known Problems Maternal Aunt    • No Known Problems Maternal Aunt    • No Known Problems Paternal Aunt    • Colon cancer Neg Hx    • Ovarian cancer Neg Hx      Social History     Tobacco Use   • Smoking status: Former     Packs/day: 0.25     Types: Cigarettes   • Smokeless tobacco: Never   • Tobacco comments:     Quit in 2020   Substance Use Topics   • Alcohol use: Never     Allergies   Allergen Reactions   • Latex      Other reaction(s): swollen sore sensation         Current Outpatient Medications:   •  calcitriol (ROCALTROL) 0.25 mcg capsule, Take one tablet with dinner, Disp: 90 capsule, Rfl: 1  •  calcium carbonate (OS-DENNIS) 600 MG tablet, Take 2 tablets (1,200 mg total) by mouth 2 (two) times a day with meals, Disp: , Rfl:   •  Cholecalciferol (Vitamin D3) 50 MCG (2000 UT) capsule, Take 5000 IU daily, Disp: , Rfl: 0  •  Synthroid 150 MCG tablet, Take 1 tablet daily Mon-Sat and 1.5 tablets on Sunday. Brand necessary, Disp: 98 tablet, Rfl: 1    Review of Systems   HENT: Negative. Eyes: Negative. Respiratory: Negative. Cardiovascular: Negative. Gastrointestinal: Negative. Endocrine: Negative. Musculoskeletal: Negative.     Skin: Negative. Allergic/Immunologic: Negative. Neurological: Negative. Hematological: Negative. Psychiatric/Behavioral: Negative. Physical Exam:  There is no height or weight on file to calculate BMI. LMP 07/03/2023 (Approximate)    There were no vitals filed for this visit. Physical Exam  Constitutional:       General: She is not in acute distress. Appearance: She is well-developed. She is not ill-appearing. HENT:      Head: Normocephalic and atraumatic. Nose: Nose normal.      Mouth/Throat:      Pharynx: Oropharynx is clear. Eyes:      Extraocular Movements: Extraocular movements intact. Conjunctiva/sclera: Conjunctivae normal.   Neck:      Thyroid: No thyromegaly. Cardiovascular:      Rate and Rhythm: Normal rate. Pulmonary:      Effort: Pulmonary effort is normal.   Musculoskeletal:         General: No deformity. Cervical back: Normal range of motion. Skin:     Capillary Refill: Capillary refill takes less than 2 seconds. Coloration: Skin is not pale. Findings: No rash. Neurological:      Mental Status: She is alert and oriented to person, place, and time.    Psychiatric:         Behavior: Behavior normal.         Labs:   Lab Results   Component Value Date    HGBA1C 5.6 10/22/2022       Lab Results   Component Value Date    QBF9OHPHYKEN 0.070 (L) 08/01/2023       Lab Results   Component Value Date    CREATININE 1.02 08/01/2023    CREATININE 0.88 10/22/2022    CREATININE 0.85 03/15/2022    BUN 22 08/01/2023     11/26/2015    K 4.0 08/01/2023     08/01/2023    CO2 24 08/01/2023     eGFR   Date Value Ref Range Status   08/01/2023 67 ml/min/1.73sq m Final       Lab Results   Component Value Date    ALT 14 08/01/2023    AST 12 (L) 08/01/2023    ALKPHOS 69 08/01/2023       Lab Results   Component Value Date    CHOLESTEROL 217 (H) 10/22/2022     Lab Results   Component Value Date    HDL 57 10/22/2022     Lab Results   Component Value Date    TRIG 202 (H) 10/22/2022     Lab Results   Component Value Date    [address] [facility] 160 10/22/2022         Impression:  1. Postoperative hypothyroidism    2. Vitamin D deficiency    3. BMI 37.0-37.9, adult    4. Hypoparathyroidism, unspecified hypoparathyroidism type ([address])    5. Iron deficiency         Plan:    Diagnoses and all orders for this visit:    Postoperative hypothyroidism. She now seems clinically and biochemically euthyroid based on recent thyroid function tests. We agreed to continue brand Synthroid 150 mcg 1 tablet Monday through Saturday and 2 tablets Sunday. Follow-up with repeat testing in 3 months. -     T4, free; Future  -     TSH, 3rd generation; Future    Vitamin D deficiency. She has persistently low 25-hydroxy vitamin D levels. Since there is some evidence of maintaining adequate levels of 25-hydroxy vitamin D associated with vitamin D receptor stimulation, we agreed to initiate vitamin D2 50 K weekly dose for 12 weeks. -     Vitamin D 25 hydroxy; Future    BMI 37.0-37.9, adult. She made diet and lifestyle modifications and lost 9 pounds since last visit. Encouraged to continue with diet and lifestyle modifications. In future, will consider using a GLP-1 agonist.    Hypoparathyroidism, unspecified hypoparathyroidism type ([address] She has significant hypocalcemia of 6.8 mg/dL associated with very low PTH of 3.8 pg/mL. Phosphorus was elevated at 3.8 mg/dL. We agreed to increase calcitriol to 0.25 mcg twice daily. -     Comprehensive metabolic panel; Future    Iron deficiency. She has mild iron deficiency based on a ferritin of 15 ng/mL. Suggested using a prenatal vitamin that has both iron and other multivitamins. I have spent 35 minutes with patient today in which greater than 50% of this time was spent in counseling/coordination of care. Discussed with the patient and all questioned fully answered. She will call me if any problems arise.     Educated/ Counseled patient on diagnostic test results, prognosis, risk vs benefit of treatment options, importance of treatment compliance, healthy life and lifestyle choices.       [facility]

## 2023-08-09 DIAGNOSIS — E55.9 VITAMIN D DEFICIENCY: Primary | ICD-10-CM

## 2023-08-09 RX ORDER — ERGOCALCIFEROL 1.25 MG/1
50000 CAPSULE ORAL WEEKLY
Qty: 12 CAPSULE | Refills: 0 | Status: SHIPPED | OUTPATIENT
Start: 2023-08-09 | End: 2023-10-26

## 2023-09-13 DIAGNOSIS — E83.51 HYPOCALCEMIA: ICD-10-CM

## 2023-09-13 RX ORDER — CALCITRIOL 0.25 UG/1
0.25 CAPSULE, LIQUID FILLED ORAL 2 TIMES DAILY
Qty: 180 CAPSULE | Refills: 1 | Status: SHIPPED | OUTPATIENT
Start: 2023-09-13

## 2023-09-19 ENCOUNTER — OFFICE VISIT (OUTPATIENT)
Dept: OBGYN CLINIC | Facility: CLINIC | Age: 43
End: 2023-09-19

## 2023-09-19 VITALS
HEIGHT: 68 IN | SYSTOLIC BLOOD PRESSURE: 138 MMHG | RESPIRATION RATE: 18 BRPM | HEART RATE: 88 BPM | BODY MASS INDEX: 38.19 KG/M2 | DIASTOLIC BLOOD PRESSURE: 70 MMHG | WEIGHT: 252 LBS

## 2023-09-19 DIAGNOSIS — Z20.2 POSSIBLE EXPOSURE TO STD: Primary | ICD-10-CM

## 2023-09-19 DIAGNOSIS — N90.89 LABIAL LESION: ICD-10-CM

## 2023-09-19 PROCEDURE — 87491 CHLMYD TRACH DNA AMP PROBE: CPT | Performed by: NURSE PRACTITIONER

## 2023-09-19 PROCEDURE — 99213 OFFICE O/P EST LOW 20 MIN: CPT | Performed by: NURSE PRACTITIONER

## 2023-09-19 PROCEDURE — 87591 N.GONORRHOEAE DNA AMP PROB: CPT | Performed by: NURSE PRACTITIONER

## 2023-09-19 NOTE — PROGRESS NOTES
Susanne Baez was seen today for std testing. Diagnoses and all orders for this visit:    Possible exposure to STD  -     Hepatitis C antibody; Future  -     HIV 1/2 AG/AB w Reflex [facility] for 2 yr old and above; Future  -     RPR-Syphilis Screening (Total Syphilis IGG/IGM); Future  -     Hepatitis B surface antigen  -     Herpes I/II IgG KEMAL w Reflex to HSV-2; Future  -     Chlamydia/GC amplified DNA by PCR    Labial lesion  Patient evaluated by Dr. Rafael Garsia Referral to Dermatology; Future         Possible STD exposure    Rt labial lesion- pt desires removal    Plan  Referral to dermatology for evaluation -information provided to patient. Discussed safe sexual practice in detail, condoms when sexually active,   Will call pt with results       Rocael Campoverde is a 39 y.o. female who presents for sexually transmitted disease check. Sexual history reviewed with the patient. STI Exposure: sexual contact with individual with uncertain background 6 months ago. Previous history of STI none. Current symptoms none. Patient desires blood work. Patient also reports labial lesion that she would like to have removed, has been reviewed previously  Contraception: condoms  Menstrual History:  OB History        1    Para        Term                AB   1    Living           SAB   1    IAB        Ectopic        Multiple        Live Births   0                  Patient's last menstrual period was 2023 (exact date). The following portions of the patient's history were reviewed and updated as appropriate: allergies, current medications, past family history, past medical history, past social history, past surgical history and problem list.    Review of Systems  Pertinent items are noted in HPI.         Objective      /70 (BP Location: Right arm, Patient Position: Sitting, Cuff Size: Adult)   Pulse 88   Resp 18   Ht 5' 8" (1.727 m)   Wt 114 kg (252 lb) LMP 09/05/2023 (Exact Date)   BMI 38.32 kg/m²     General:   alert and oriented, in no acute distress   Heart: regular rate and rhythm, S1, S2 normal, no murmur, click, rub or gallop   Lungs: clear to auscultation bilaterally   Abdomen: soft, non-tender, without masses or organomegaly   Vulva: Bartholin's, Urethra, Whiteland's normal. Rt labia minora with small lesion, may be a calcium deposit,  NT,    Vagina: normal mucosa   Cervix: no cervical motion tenderness   Uterus: normal size   Adnexa: no mass, fullness, tenderness   Lymph Nodes:  Cervical, supraclavicular, and axillary nodes normal.   Cultures: GC and Chlamydia genprobes

## 2023-09-20 ENCOUNTER — TELEPHONE (OUTPATIENT)
Dept: OBGYN CLINIC | Facility: CLINIC | Age: 43
End: 2023-09-20

## 2023-09-20 LAB
C TRACH DNA SPEC QL NAA+PROBE: NEGATIVE
N GONORRHOEA DNA SPEC QL NAA+PROBE: NEGATIVE

## 2023-09-20 NOTE — TELEPHONE ENCOUNTER
----- Message from Carlo November, [address] [facility] sent at 9/20/2023  4:01 PM EDT -----  Please inform pt that her culture for chlamydia and gonorrhea were negative. Thank You!

## 2023-09-21 ENCOUNTER — TELEPHONE (OUTPATIENT)
Dept: DERMATOLOGY | Facility: CLINIC | Age: 43
End: 2023-09-21

## 2023-09-23 ENCOUNTER — APPOINTMENT (OUTPATIENT)
Dept: LAB | Facility: CLINIC | Age: 43
End: 2023-09-23
Payer: COMMERCIAL

## 2023-09-23 DIAGNOSIS — Z20.2 POSSIBLE EXPOSURE TO STD: ICD-10-CM

## 2023-09-23 DIAGNOSIS — E55.9 VITAMIN D DEFICIENCY: ICD-10-CM

## 2023-09-23 DIAGNOSIS — E20.9 HYPOPARATHYROIDISM, UNSPECIFIED HYPOPARATHYROIDISM TYPE (HCC): ICD-10-CM

## 2023-09-23 DIAGNOSIS — E89.0 POSTOPERATIVE HYPOTHYROIDISM: ICD-10-CM

## 2023-09-23 LAB
25(OH)D3 SERPL-MCNC: 27.2 NG/ML (ref 30–100)
ALBUMIN SERPL BCP-MCNC: 4.3 G/DL (ref 3.5–5)
ALP SERPL-CCNC: 65 U/L (ref 34–104)
ALT SERPL W P-5'-P-CCNC: 16 U/L (ref 7–52)
ANION GAP SERPL CALCULATED.3IONS-SCNC: 10 MMOL/L
AST SERPL W P-5'-P-CCNC: 13 U/L (ref 13–39)
BILIRUB SERPL-MCNC: 0.57 MG/DL (ref 0.2–1)
BUN SERPL-MCNC: 16 MG/DL (ref 5–25)
CALCIUM SERPL-MCNC: 8 MG/DL (ref 8.4–10.2)
CHLORIDE SERPL-SCNC: 102 MMOL/L (ref 96–108)
CO2 SERPL-SCNC: 24 MMOL/L (ref 21–32)
CREAT SERPL-MCNC: 0.95 MG/DL (ref 0.6–1.3)
GFR SERPL CREATININE-BSD FRML MDRD: 73 ML/MIN/1.73SQ M
GLUCOSE P FAST SERPL-MCNC: 115 MG/DL (ref 65–99)
HIV 1+2 AB+HIV1 P24 AG SERPL QL IA: NORMAL
HIV 2 AB SERPL QL IA: NORMAL
HIV1 AB SERPL QL IA: NORMAL
HIV1 P24 AG SERPL QL IA: NORMAL
POTASSIUM SERPL-SCNC: 4 MMOL/L (ref 3.5–5.3)
PROT SERPL-MCNC: 7.4 G/DL (ref 6.4–8.4)
SODIUM SERPL-SCNC: 136 MMOL/L (ref 135–147)
T4 FREE SERPL-MCNC: 0.69 NG/DL (ref 0.61–1.12)
TREPONEMA PALLIDUM IGG+IGM AB [PRESENCE] IN SERUM OR PLASMA BY IMMUNOASSAY: NORMAL
TSH SERPL DL<=0.05 MIU/L-ACNC: 3.65 UIU/ML (ref 0.45–4.5)

## 2023-09-23 PROCEDURE — 80053 COMPREHEN METABOLIC PANEL: CPT

## 2023-09-23 PROCEDURE — 82306 VITAMIN D 25 HYDROXY: CPT

## 2023-09-23 PROCEDURE — 84439 ASSAY OF FREE THYROXINE: CPT

## 2023-09-23 PROCEDURE — 86803 HEPATITIS C AB TEST: CPT

## 2023-09-23 PROCEDURE — 86696 HERPES SIMPLEX TYPE 2 TEST: CPT

## 2023-09-23 PROCEDURE — 36415 COLL VENOUS BLD VENIPUNCTURE: CPT

## 2023-09-23 PROCEDURE — 87340 HEPATITIS B SURFACE AG IA: CPT | Performed by: NURSE PRACTITIONER

## 2023-09-23 PROCEDURE — 84443 ASSAY THYROID STIM HORMONE: CPT

## 2023-09-23 PROCEDURE — 87389 HIV-1 AG W/HIV-1&-2 AB AG IA: CPT

## 2023-09-23 PROCEDURE — 86695 HERPES SIMPLEX TYPE 1 TEST: CPT

## 2023-09-23 PROCEDURE — 86780 TREPONEMA PALLIDUM: CPT

## 2023-09-24 LAB
HBV SURFACE AG SER QL: NORMAL
HCV AB SER QL: NORMAL

## 2023-09-25 LAB
HSV1 IGG SER IA-ACNC: <0.91 INDEX (ref 0–0.9)
HSV2 IGG SER IA-ACNC: >23.6 INDEX (ref 0–0.9)

## 2023-09-28 ENCOUNTER — TELEPHONE (OUTPATIENT)
Dept: OBGYN CLINIC | Facility: CLINIC | Age: 43
End: 2023-09-28

## 2023-10-04 ENCOUNTER — TELEPHONE (OUTPATIENT)
Dept: OBGYN CLINIC | Facility: CLINIC | Age: 43
End: 2023-10-04

## 2023-10-04 DIAGNOSIS — R89.4 POSITIVE TEST FOR HERPES SIMPLEX VIRUS ANTIBODY: Primary | ICD-10-CM

## 2023-10-04 RX ORDER — VALACYCLOVIR HYDROCHLORIDE 500 MG/1
500 TABLET, FILM COATED ORAL DAILY
Qty: 30 TABLET | Refills: 2 | Status: SHIPPED | OUTPATIENT
Start: 2023-10-04 | End: 2023-10-31

## 2023-10-04 NOTE — TELEPHONE ENCOUNTER
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 Patient has never had an outbreak. Patient desires to go on daily suppression, will order Valtrex 500 mg 1 pill daily. Reviewed contact precautions with herpes if she has an outbreak. Call with any concerns.

## 2023-10-31 DIAGNOSIS — R89.4 POSITIVE TEST FOR HERPES SIMPLEX VIRUS ANTIBODY: ICD-10-CM

## 2023-10-31 RX ORDER — VALACYCLOVIR HYDROCHLORIDE 500 MG/1
500 TABLET, FILM COATED ORAL DAILY
Qty: 90 TABLET | Refills: 0 | Status: SHIPPED | OUTPATIENT
Start: 2023-10-31 | End: 2024-01-29

## 2023-11-21 ENCOUNTER — OFFICE VISIT (OUTPATIENT)
Dept: DERMATOLOGY | Facility: CLINIC | Age: 43
End: 2023-11-21

## 2023-11-21 VITALS — HEIGHT: 68 IN | BODY MASS INDEX: 38.19 KG/M2 | TEMPERATURE: 96.8 F | WEIGHT: 252 LBS

## 2023-11-21 DIAGNOSIS — D48.5 NEOPLASM OF UNCERTAIN BEHAVIOR OF SKIN: Primary | ICD-10-CM

## 2023-11-21 DIAGNOSIS — N90.89 LABIAL LESION: ICD-10-CM

## 2023-11-21 NOTE — PATIENT INSTRUCTIONS
NEOPLASM OF UNCERTAIN BEHAVIOR OF SKIN    Assessment and Plan:  I have discussed with the patient that a sample of skin via a "skin biopsy” would be potentially helpful to further make a specific diagnosis under the microscope. Based on a thorough discussion of this condition and the management approach to it (including a comprehensive discussion of the known risks, side effects and potential benefits of treatment), the patient (family) agrees to implement the following specific plan:    Procedure:  Skin Biopsy. After a thorough discussion of treatment options and risk/benefits/alternatives (including but not limited to local pain, scarring, dyspigmentation, blistering, possible superinfection, and inability to confirm a diagnosis via histopathology), verbal and written consent were obtained and portion of the rash was biopsied for tissue sample. See below for consent that was obtained from patient and subsequent Procedure Note. INFORMED CONSENT DISCUSSION AND POST-OPERATIVE INSTRUCTIONS FOR PATIENT    I.  RATIONALE FOR PROCEDURE  I understand that a skin biopsy allows the Dermatologist to test a lesion or rash under the microscope to obtain a diagnosis. It usually involves numbing the area with numbing medication and removing a small piece of skin; sometimes the area will be closed with sutures. In this specific procedure, sutures are not usually needed. If any sutures are placed, then they are usually need to be removed in 2 weeks or less. I understand that my Dermatologist recommends that a skin "shave" biopsy be performed today. A local anesthetic, similar to the kind that a dentist uses when filling a cavity, will be injected with a very small needle into the skin area to be sampled. The injected skin and tissue underneath "will go to sleep” and become numb so no pain should be felt afterwards.   An instrument shaped like a tiny "razor blade" (shave biopsy instrument) will be used to cut a small piece of tissue and skin from the area so that a sample of tissue can be taken and examined more closely under the microscope. A slight amount of bleeding will occur, but it will be stopped with direct pressure and a pressure bandage and any other appropriate methods. I understands that a scar will form where the wound was created. Surgical ointment will be applied to help protect the wound. Sutures are not usually needed. II.  RISKS AND POTENTIAL COMPLICATIONS   I understand the risks and potential complications of a skin biopsy include but are not limited to the following:  Bleeding  Infection  Pain  Scar/keloid  Skin discoloration  Incomplete Removal  Recurrence  Nerve Damage/Numbness/Loss of Function  Allergic Reaction to Anesthesia  Biopsies are diagnostic procedures and based on findings additional treatment or evaluation may be required  Loss or destruction of specimen resulting in no additional findings    My Dermatologist has explained to me the nature of the condition, the nature of the procedure, and the benefits to be reasonably expected compared with alternative approaches. My Dermatologist has discussed the likelihood of major risks or complications of this procedure including the specific risks listed above, such as bleeding, infection, and scarring/keloid. I understand that a scar is expected after this procedure. I understand that my physician cannot predict if the scar will form a "keloid," which extends beyond the borders of the wound that is created. A keloid is a thick, painful, and bumpy scar. A keloid can be difficult to treat, as it does not always respond well to therapy, which includes injecting cortisone directly into the keloid every few weeks. While this usually reduces the pain and size of the scar, it does not eliminate it. I understand that photographs may be taken before and after the procedure.   These will be maintained as part of the medical providers confidential records and may not be made available to me. I further authorize the medical provider to use the photographs for teaching purposes or to illustrate scientific papers, books, or lectures if in his/her judgment, medical research, education, or science may benefit from its use. I have had an opportunity to fully inquire about the risks and benefits of this procedure and its alternatives. I have been given ample time and opportunity to ask questions and to seek a second opinion if I wished to do so. I acknowledge that there have specifically been no guarantees as to the cosmetic results from the procedure. I am aware that with any procedure there is always the possibility of an unexpected complication. III. POST-PROCEDURAL CARE (WHAT YOU WILL NEED TO DO "AFTER THE BIOPSY" TO OPTIMIZE HEALING)    Keep the area clean and dry. Try NOT to remove the bandage or get it wet for the first 24 hours. Gently clean the area and apply surgical ointment (such as Vaseline petrolatum ointment, which is available "over the counter" and not a prescription) to the biopsy site for up to 2 weeks straight. This acts to protect the wound from the outside world. Sutures are not usually placed in this procedure. If any sutures were placed, return for suture removal as instructed (generally 1 week for the face, 2 weeks for the body). Take Acetaminophen (Tylenol) for discomfort, if no contraindications. Ibuprofen or aspirin could make bleeding worse. Call our office immediately for signs of infection: fever, chills, increased redness, warmth, tenderness, discomfort/pain, or pus or foul smell coming from the wound. WHAT TO DO IF THERE IS ANY BLEEDING? If a small amount of bleeding is noticed, place a clean cloth over the area and apply firm pressure for ten minutes. Check the wound after 10 minutes of direct pressure.   If bleeding persists, try one more time for an additional 10 minutes of direct pressure on the area.  If the bleeding becomes heavier or does not stop after the second attempt, or if you have any other questions about this procedure, then please call your [facility] Dermatologist by calling [phone] (SKIN). I hereby acknowledge that I have reviewed and verified the site with my Dermatologist and have requested and authorized my Dermatologist to proceed with the procedure.

## 2023-11-21 NOTE — PROGRESS NOTES
[facility] Note     Patient Name: Elías Gusman  Encounter Date: 11/21/23     Have you been cared for by a [facility] Dermatologist in the last 3 years and, if so, which description applies to you? NO. I am considered a "new" patient and must complete all patient intake questions. I am FEMALE/of child-bearing potential.    REVIEW OF SYSTEMS:  Have you recently had or currently have any of the following? Recent fever or chills? No  Any non-healing wound? No  Are you pregnant or planning to become pregnant? No  Are you currently or planning to be nursing or breast feeding? No   PAST MEDICAL HISTORY:  Have you personally ever had or currently have any of the following? If "YES," then please provide more detail. Skin cancer (such as Melanoma, Basal Cell Carcinoma, Squamous Cell Carcinoma? No  Tuberculosis, HIV/AIDS, Hepatitis B or C: No  Radiation Treatment No   HISTORY OF IMMUNOSUPPRESSION:   Do you have a history of any of the following:  Systemic Immunosuppression such as Diabetes, Biologic or Immunotherapy, Chemotherapy, Organ Transplantation, Bone Marrow Transplantation? No    Answering "YES" requires the addition of the dotphrase "IMMUNOSUPPRESSED" as the first diagnosis of the patient's visit. FAMILY HISTORY:  Any "first degree relatives" (parent, brother, sister, or child) with the following? Skin Cancer, Pancreatic or Other Cancer? No   PATIENT EXPERIENCE:    Do you want the Dermatologist to perform a COMPLETE skin exam today including a clinical examination under the "bra and underwear" areas? NO  If necessary, do we have your permission to call and leave a detailed message on your Preferred Phone number that includes your specific medical information?   Yes      Allergies   Allergen Reactions    Latex      Other reaction(s): swollen sore sensation      Current Outpatient Medications:     calcitriol (ROCALTROL) 0.25 mcg capsule, Take 1 capsule (0.25 mcg total) by mouth 2 (two) times a day, Disp: 180 capsule, Rfl: 1    calcium carbonate (OS-DENNIS) 600 MG tablet, Take 2 tablets (1,200 mg total) by mouth 2 (two) times a day with meals, Disp: , Rfl:     Synthroid 150 MCG tablet, Take 1 tablet daily Mon-Sat and 1.5 tablets on Sunday. Brand necessary, Disp: 98 tablet, Rfl: 1    valACYclovir (VALTREX) 500 mg tablet, Take 1 tablet (500 mg total) by mouth daily, Disp: 90 tablet, Rfl: 0    ergocalciferol (VITAMIN D2) 50,000 units, Take 1 capsule (50,000 Units total) by mouth once a week for 12 doses, Disp: 12 capsule, Rfl: 0    ergocalciferol (VITAMIN D2) 50,000 units, Take 1 capsule (50,000 Units total) by mouth once a week for 12 doses, Disp: 12 capsule, Rfl: 0          Whom besides the patient is providing clinical information about today's encounter? NO ADDITIONAL HISTORIAN (patient alone provided history)    Physical Exam and Assessment/Plan by Diagnosis:      NEOPLASM OF UNCERTAIN BEHAVIOR OF SKIN    Physical Exam:  (Anatomic Location); (Size and Morphological Description); (Differential Diagnosis):  A: labia minora; skin, shave; 39 y.o female with a 0.5 mm yellow white papule diff dx: calcinosis cutis vs milium vs cyst  Pertinent Positives:  Pertinent Negatives: Additional History of Present Condition:   Patient referred by GYN. She reports onset of palpable lesion on labia in March which has progressively gotten larger. She denies any pain, discomfort, pruritus, or bleeding. Additional history includes hypocalcemia. Patient reports prior calcium deposits in the past.    Assessment and Plan:  I have discussed with the patient that a sample of skin via a "skin biopsy” would be potentially helpful to further make a specific diagnosis under the microscope.   Based on a thorough discussion of this condition and the management approach to it (including a comprehensive discussion of the known risks, side effects and potential benefits of treatment), the patient (family) agrees to implement the following specific plan:    Procedure:  Skin Biopsy. After a thorough discussion of treatment options and risk/benefits/alternatives (including but not limited to local pain, scarring, dyspigmentation, blistering, possible superinfection, and inability to confirm a diagnosis via histopathology), verbal and written consent were obtained and portion of the rash was biopsied for tissue sample. See below for consent that was obtained from patient and subsequent Procedure Note. PROCEDURE TANGENTIAL (SHAVE) BIOPSY NOTE:    Performing Physician: Lester Adams  Anatomic Location; Clinical Description with size (cm); Pre-Op Diagnosis:   A: labia minora; skin, shave; 39 y.o female with a 0.5 mm yellow white papule diff dx: calcinosis cutis vs milium  Post-op diagnosis: Same     Local anesthesia: 1% xylocaine with epi      Topical anesthesia: None    Hemostasis: Aluminum chloride       After obtaining informed consent  at which time there was a discussion about the purpose of biopsy  and low risks of infection and bleeding. The area was prepped and draped in the usual fashion. Anesthesia was obtained with 1% lidocaine with epinephrine. A shave biopsy to an appropriate sampling depth was obtained by Shave (Dermablade or 15 blade) The resulting wound was covered with surgical ointment and bandaged appropriately. The patient tolerated the procedure well without complications and was without signs of functional compromise. Specimen has been sent for review by Dermatopathology. Standard post-procedure care has been explained and has been included in written form within the patient's copy of Informed Consent. INFORMED CONSENT DISCUSSION AND POST-OPERATIVE INSTRUCTIONS FOR PATIENT    I.  RATIONALE FOR PROCEDURE  I understand that a skin biopsy allows the Dermatologist to test a lesion or rash under the microscope to obtain a diagnosis.   It usually involves numbing the area with numbing medication and removing a small piece of skin; sometimes the area will be closed with sutures. In this specific procedure, sutures are not usually needed. If any sutures are placed, then they are usually need to be removed in 2 weeks or less. I understand that my Dermatologist recommends that a skin "shave" biopsy be performed today. A local anesthetic, similar to the kind that a dentist uses when filling a cavity, will be injected with a very small needle into the skin area to be sampled. The injected skin and tissue underneath "will go to sleep” and become numb so no pain should be felt afterwards. An instrument shaped like a tiny "razor blade" (shave biopsy instrument) will be used to cut a small piece of tissue and skin from the area so that a sample of tissue can be taken and examined more closely under the microscope. A slight amount of bleeding will occur, but it will be stopped with direct pressure and a pressure bandage and any other appropriate methods. I understands that a scar will form where the wound was created. Surgical ointment will be applied to help protect the wound. Sutures are not usually needed. II.  RISKS AND POTENTIAL COMPLICATIONS   I understand the risks and potential complications of a skin biopsy include but are not limited to the following:  Bleeding  Infection  Pain  Scar/keloid  Skin discoloration  Incomplete Removal  Recurrence  Nerve Damage/Numbness/Loss of Function  Allergic Reaction to Anesthesia  Biopsies are diagnostic procedures and based on findings additional treatment or evaluation may be required  Loss or destruction of specimen resulting in no additional findings    My Dermatologist has explained to me the nature of the condition, the nature of the procedure, and the benefits to be reasonably expected compared with alternative approaches.   My Dermatologist has discussed the likelihood of major risks or complications of this procedure including the specific risks listed above, such as bleeding, infection, and scarring/keloid. I understand that a scar is expected after this procedure. I understand that my physician cannot predict if the scar will form a "keloid," which extends beyond the borders of the wound that is created. A keloid is a thick, painful, and bumpy scar. A keloid can be difficult to treat, as it does not always respond well to therapy, which includes injecting cortisone directly into the keloid every few weeks. While this usually reduces the pain and size of the scar, it does not eliminate it. I understand that photographs may be taken before and after the procedure. These will be maintained as part of the medical providers confidential records and may not be made available to me. I further authorize the medical provider to use the photographs for teaching purposes or to illustrate scientific papers, books, or lectures if in his/her judgment, medical research, education, or science may benefit from its use. I have had an opportunity to fully inquire about the risks and benefits of this procedure and its alternatives. I have been given ample time and opportunity to ask questions and to seek a second opinion if I wished to do so. I acknowledge that there have specifically been no guarantees as to the cosmetic results from the procedure. I am aware that with any procedure there is always the possibility of an unexpected complication. III. POST-PROCEDURAL CARE (WHAT YOU WILL NEED TO DO "AFTER THE BIOPSY" TO OPTIMIZE HEALING)    Keep the area clean and dry. Try NOT to remove the bandage or get it wet for the first 24 hours. Gently clean the area and apply surgical ointment (such as Vaseline petrolatum ointment, which is available "over the counter" and not a prescription) to the biopsy site for up to 2 weeks straight. This acts to protect the wound from the outside world. Sutures are not usually placed in this procedure.   If any sutures were placed, return for suture removal as instructed (generally 1 week for the face, 2 weeks for the body). Take Acetaminophen (Tylenol) for discomfort, if no contraindications. Ibuprofen or aspirin could make bleeding worse. Call our office immediately for signs of infection: fever, chills, increased redness, warmth, tenderness, discomfort/pain, or pus or foul smell coming from the wound. WHAT TO DO IF THERE IS ANY BLEEDING? If a small amount of bleeding is noticed, place a clean cloth over the area and apply firm pressure for ten minutes. Check the wound after 10 minutes of direct pressure. If bleeding persists, try one more time for an additional 10 minutes of direct pressure on the area. If the bleeding becomes heavier or does not stop after the second attempt, or if you have any other questions about this procedure, then please call your [facility] Dermatologist by calling [phone] (SKIN). I hereby acknowledge that I have reviewed and verified the site with my Dermatologist and have requested and authorized my Dermatologist to proceed with the procedure.         Scribe Attestation      I,:  Jeannette Mejia am acting as a scribe while in the presence of the attending physician.:       I,:  Vanna Hilton MD personally performed the services described in this documentation    as scribed in my presence.:           Ashley Tatum MD  Dermatology, PGY-3

## 2024-01-02 DIAGNOSIS — E55.9 VITAMIN D DEFICIENCY: ICD-10-CM

## 2024-01-02 RX ORDER — ERGOCALCIFEROL 1.25 MG/1
50000 CAPSULE ORAL WEEKLY
Qty: 12 CAPSULE | Refills: 0 | Status: SHIPPED | OUTPATIENT
Start: 2024-01-02 | End: 2024-03-20

## 2024-01-09 ENCOUNTER — TELEPHONE (OUTPATIENT)
Dept: ENDOCRINOLOGY | Facility: CLINIC | Age: 44
End: 2024-01-09

## 2024-01-09 NOTE — TELEPHONE ENCOUNTER
Returned pt's v/m from today requesting an appt.     Returned call and left v/m to call office to sched.

## 2024-01-22 ENCOUNTER — OFFICE VISIT (OUTPATIENT)
Dept: ENDOCRINOLOGY | Facility: CLINIC | Age: 44
End: 2024-01-22
Payer: COMMERCIAL

## 2024-01-22 VITALS
SYSTOLIC BLOOD PRESSURE: 126 MMHG | OXYGEN SATURATION: 100 % | DIASTOLIC BLOOD PRESSURE: 84 MMHG | WEIGHT: 260 LBS | TEMPERATURE: 97.3 F | HEART RATE: 74 BPM | HEIGHT: 68 IN | BODY MASS INDEX: 39.4 KG/M2 | RESPIRATION RATE: 14 BRPM

## 2024-01-22 DIAGNOSIS — E20.9 HYPOPARATHYROIDISM, UNSPECIFIED HYPOPARATHYROIDISM TYPE (HCC): ICD-10-CM

## 2024-01-22 DIAGNOSIS — E55.9 VITAMIN D DEFICIENCY: ICD-10-CM

## 2024-01-22 DIAGNOSIS — E61.1 IRON DEFICIENCY: ICD-10-CM

## 2024-01-22 DIAGNOSIS — E89.0 POSTOPERATIVE HYPOTHYROIDISM: Primary | ICD-10-CM

## 2024-01-22 PROCEDURE — 99214 OFFICE O/P EST MOD 30 MIN: CPT | Performed by: INTERNAL MEDICINE

## 2024-01-22 NOTE — ASSESSMENT & PLAN NOTE
She has gained 10 lbs.  We agreed to continue current levothyroxine 150mcg M-Sat and 300mcg Sunday.    Repeat labs to titrate therapy.

## 2024-01-22 NOTE — ASSESSMENT & PLAN NOTE
Post surgical after total thyroidectomy.  She continues to reports some muscle cramps.    Will repeat PTH, I cori and vit D levels.

## 2024-01-22 NOTE — PATIENT INSTRUCTIONS
Instructions    Diet:   Take 1500 cori/day of balanced diet with 1/3rd carbs, 1/3rd protein and rest fiber and small amount fat.   Try to include fasting for 12-16hrs -early dinner and late breakfast.  Drink at least 64 oz fluids daily  Lifestyle:   30 min brisk activity most days. Join [facility] [employer] medical fitness training to build muscles and burn fat.  Medical fitness: follow these exercise links  Full Version - [email or web address]     Warmup - [email or web address]     Lower Body - [email or web address]     Upper Body - [email or web address]     Core -  [email or web address]    Medications: look up Wegovy, Tirzepatide, Victoza, Zepbound - weight loss meds.  Consider switching from medications that cause weight gain to weight neutral medications.  Other:   Make sure you are treated appropriately if you have sleep apnea.  We may consider bariatric surgery if we dont see benefit over 6-12 months.

## 2024-01-22 NOTE — PROGRESS NOTES
"  Follow-up Patient Progress Note      CC: Postoperative hypothyroidism, obesity     History of Present Illness:   43-year-old female with postoperative hypothyroidism since age 18, s/p total thyroidectomy 2015 [large multinodular goiter with obstructive symptoms] [surgical pathology was benign but showed 3 parathyroid glands], post operative hypoparathyroidism, obesity, vitamin D deficiency. Last visit was  8/3/2023.     Since last visit she gained 10 lbs.     She stopped all medications since 2/2023 due to life issues.     Current meds: all started last week.  Brand synthroid 150mcg 1 tab daily M-S and Sunday 2 tabs  Calcitriol 0.25mcg daily  Calcium 1000mg tidwm     Menstrual Hx: regular until 2 months ago    FHx: Type 2 diabetes- MGM, M uncle      Physical Exam:  Body mass index is 39.53 kg/m².  /84 (BP Location: Left arm, Patient Position: Sitting)   Pulse 74   Temp (!) 97.3 °F (36.3 °C) (Tympanic)   Resp 14   Ht 5' 8\" (1.727 m)   Wt 118 kg (260 lb)   SpO2 100%   BMI 39.53 kg/m²    Vitals:    01/22/24 0950   Weight: 118 kg (260 lb)        Physical Exam  Constitutional:       General: She is not in acute distress.     Appearance: She is well-developed. She is not ill-appearing.   HENT:      Head: Normocephalic and atraumatic.      Nose: Nose normal.      Mouth/Throat:      Pharynx: Oropharynx is clear.   Eyes:      Extraocular Movements: Extraocular movements intact.      Conjunctiva/sclera: Conjunctivae normal.   Neck:      Thyroid: No thyromegaly.   Cardiovascular:      Rate and Rhythm: Normal rate.   Pulmonary:      Effort: Pulmonary effort is normal.   Musculoskeletal:         General: No deformity.      Cervical back: Normal range of motion.   Skin:     Capillary Refill: Capillary refill takes less than 2 seconds.      Coloration: Skin is not pale.      Findings: No rash.   Neurological:      Mental Status: She is alert and oriented to person, place, and time.   Psychiatric:         Behavior: " Behavior normal.         Labs:   Lab Results   Component Value Date    HGBA1C 5.6 10/22/2022       Lab Results   Component Value Date    FCB2MRJWKXZU 3.653 09/23/2023       Lab Results   Component Value Date    CREATININE 0.95 09/23/2023    CREATININE 1.02 08/01/2023    CREATININE 0.88 10/22/2022    BUN 16 09/23/2023     11/26/2015    K 4.0 09/23/2023     09/23/2023    CO2 24 09/23/2023     eGFR   Date Value Ref Range Status   09/23/2023 73 ml/min/1.73sq m Final       Lab Results   Component Value Date    ALT 16 09/23/2023    AST 13 09/23/2023    ALKPHOS 65 09/23/2023       Lab Results   Component Value Date    CHOLESTEROL 217 (H) 10/22/2022     Lab Results   Component Value Date    HDL 57 10/22/2022     Lab Results   Component Value Date    TRIG 202 (H) 10/22/2022     Lab Results   Component Value Date    NONHDLC 160 10/22/2022         Assessment/Plan:    Problem List Items Addressed This Visit          Endocrine    Postoperative hypothyroidism - Primary     She has gained 10 lbs.  We agreed to continue current levothyroxine 150mcg M-Sat and 300mcg Sunday.    Repeat labs to titrate therapy.         Relevant Orders    T4, free    TSH, 3rd generation    Hypoparathyroidism (HCC)     Post surgical after total thyroidectomy.  She continues to reports some muscle cramps.    Will repeat PTH, I cori and vit D levels.         Relevant Orders    PTH, intact    Calcium, ionized       Other    Vitamin D deficiency     On ergocalciferol         Relevant Orders    Vitamin D 25 hydroxy    BMI 37.0-37.9, adult    Relevant Orders    Insulin, fasting    Ambulatory Referral to Medical Fitness Exercise Specialist    Ambulatory referral to Sleep Medicine    Iron deficiency     Check iron profile. If she is not absorbing orally, will need IV iron         Relevant Orders    Iron Panel (Includes Ferritin, Iron Sat%, Iron, and TIBC)         I have spent a total time of 35 minutes on 01/22/24 in caring for this patient including  greater than 50% of this time was spent in counseling/coordination of care as listed above.       Discussed with the patient and all questioned fully answered. She will contact me with concerns.    Ivan Dsouza

## 2024-02-21 PROBLEM — Z01.419 ENCOUNTER FOR GYNECOLOGICAL EXAMINATION WITHOUT ABNORMAL FINDING: Status: RESOLVED | Noted: 2022-03-07 | Resolved: 2024-02-21

## 2024-03-21 DIAGNOSIS — E55.9 VITAMIN D DEFICIENCY: ICD-10-CM

## 2024-03-21 RX ORDER — ERGOCALCIFEROL 1.25 MG/1
50000 CAPSULE ORAL WEEKLY
Qty: 12 CAPSULE | Refills: 0 | Status: SHIPPED | OUTPATIENT
Start: 2024-03-21 | End: 2024-06-07

## 2024-04-09 ENCOUNTER — TELEPHONE (OUTPATIENT)
Dept: OTHER | Facility: OTHER | Age: 44
End: 2024-04-09

## 2024-04-09 NOTE — TELEPHONE ENCOUNTER
Patient is calling regarding cancelling an appointment.    Date/Time:4/9/2024 / 7:45 am    Patient was rescheduled: YES [] NO [x]    Patient requesting call back to reschedule: YES [x] NO []

## 2024-05-28 ENCOUNTER — ANNUAL EXAM (OUTPATIENT)
Dept: OBGYN CLINIC | Facility: CLINIC | Age: 44
End: 2024-05-28

## 2024-05-28 VITALS
BODY MASS INDEX: 39.56 KG/M2 | DIASTOLIC BLOOD PRESSURE: 84 MMHG | HEIGHT: 68 IN | RESPIRATION RATE: 16 BRPM | WEIGHT: 261 LBS | SYSTOLIC BLOOD PRESSURE: 120 MMHG | HEART RATE: 70 BPM

## 2024-05-28 DIAGNOSIS — K64.4 SKIN TAG OF PERIANAL REGION: ICD-10-CM

## 2024-05-28 DIAGNOSIS — Z12.31 ENCOUNTER FOR SCREENING MAMMOGRAM FOR MALIGNANT NEOPLASM OF BREAST: Primary | ICD-10-CM

## 2024-05-28 DIAGNOSIS — Z01.419 ENCOUNTER FOR GYNECOLOGICAL EXAMINATION WITHOUT ABNORMAL FINDING: ICD-10-CM

## 2024-05-28 PROCEDURE — S0612 ANNUAL GYNECOLOGICAL EXAMINA: HCPCS | Performed by: NURSE PRACTITIONER

## 2024-05-28 NOTE — PROGRESS NOTES
ASSESSMENT & PLAN: Kateryna Parks is a 44 y.o.  with normal gynecologic exam.    1.  Routine well woman exam done today  2.  Pap and HPV:  The patient's last pap and hpv was 3/7/2022.    It was normal.    Pap and cotesting was not done today.    Current ASCCP Guidelines reviewed.   3.  Mammogram ordered pt to schedule  4. The following were reviewed in today's visit: breast self exam, mammography screening ordered, STD testing, adequate intake of calcium and vitamin D, exercise, healthy diet, and patient declined all STD testing  5. Gardisil vaccine in women up to age 45 discussed and information was provided.  Pt has declined in the past, and declines today  6. Referral to dermatology, has Dr Emerson for perianal skin tag removal.  7.  Reviewed to call with heavy menses, reviewed precautions.     CC:  Annual Gynecologic Examination    HPI: Kateryna Parks is a 44 y.o.  who presents for annual gynecologic examination.   Last MMG was 23 and was negative.  Hx of positive HSV type 2, on daily suppression. She has a skin tag that she has had for 10 years and feels that is getting larger for the past 1-2 years.  She desires removal    BMI Counseling: Body mass index is 39.68 kg/m². The BMI is above normal. Nutrition recommendations include encouraging healthy choices of fruits and vegetables and moderation in carbohydrate intake. Exercise recommendations include exercising 3-5 times per week. No pharmacotherapy was ordered. Rationale for BMI follow-up plan is due to patient being overweight or obese.     Depression Screening and Follow-up Plan: Patient was screened for depression during today's encounter. They screened negative with a PHQ-2 score of 0.        Tobacco Cessation Counseling: Tobacco cessation counseling was not provided. The patient is sincerely urged to quit consumption of tobacco. She is not ready to quit tobacco. The numerous health risks of tobacco consumption were discussed. Uses   5 x per year      Health Maintenance:    She wears her seatbelt routinely.    She does perform regular monthly self breast exams.    She feels safe at home.     Patient Active Problem List   Diagnosis    Hypocalcemia    Postoperative hypothyroidism    Vitamin D deficiency    History of parathyroidectomy    Vaginal discharge    High risk sexual behavior    Encounter for screening mammogram for malignant neoplasm of breast    Urticaria    BMI 37.0-37.9, adult    Annual physical exam    Hypoparathyroidism (HCC)    Iron deficiency    Possible exposure to STD    Labial lesion    Skin tag of perianal region    Encounter for gynecological examination without abnormal finding       Past Medical History:   Diagnosis Date    Anemia     H/O total thyroidectomy 10/2016    History of parathyroidectomy 10/2015    Hypothyroidism        Past Surgical History:   Procedure Laterality Date    PARATHYROIDECTOMY  10/2015    TOTAL THYROIDECTOMY  10/2015       Past OB/Gyn History:  OB History          1    Para        Term                AB   1    Living             SAB   1    IAB        Ectopic        Multiple        Live Births   0                  Pt does not have menstrual issues. Regular, x 7 days, heavy for 2 days.  History of sexually transmitted infection: Yes. HSV  History of abnormal pap smears: No .    Patient is not currently sexually active.  not sexually active. The current method of family planning is none.    Family History   Problem Relation Age of Onset    Hypothyroidism Mother     Lupus Mother     No Known Problems Father     Thyroid disease Sister     Thyroid disease Sister     Thyroid disease Sister     No Known Problems Sister     Hypothyroidism Maternal Grandmother     Diabetes type II Maternal Grandmother     Lupus Maternal Grandmother     Breast cancer Family     No Known Problems Maternal Aunt     No Known Problems Maternal Aunt     No Known Problems Paternal Aunt     Colon cancer Neg Hx     Ovarian  cancer Neg Hx        Social History:  Social History     Socioeconomic History    Marital status:      Spouse name: Not on file    Number of children: Not on file    Years of education: Not on file    Highest education level: Not on file   Occupational History    Not on file   Tobacco Use    Smoking status: Former     Current packs/day: 0.25     Types: Cigarettes    Smokeless tobacco: Never    Tobacco comments:     Quit in 2020   Vaping Use    Vaping status: Some Days    Substances: THC   Substance and Sexual Activity    Alcohol use: Never    Drug use: Not Currently     Types: Marijuana     Comment: occasionally    Sexual activity: Not Currently     Partners: Male   Other Topics Concern    Not on file   Social History Narrative    Not on file     Social Determinants of Health     Financial Resource Strain: Low Risk  (5/28/2024)    Overall Financial Resource Strain (CARDIA)     Difficulty of Paying Living Expenses: Not hard at all   Food Insecurity: No Food Insecurity (5/28/2024)    Hunger Vital Sign     Worried About Running Out of Food in the Last Year: Never true     Ran Out of Food in the Last Year: Never true   Transportation Needs: No Transportation Needs (5/28/2024)    PRAPARE - Transportation     Lack of Transportation (Medical): No     Lack of Transportation (Non-Medical): No   Physical Activity: Insufficiently Active (3/7/2022)    Exercise Vital Sign     Days of Exercise per Week: 1 day     Minutes of Exercise per Session: 60 min   Stress: No Stress Concern Present (3/7/2022)    [place] [place] [employer] - Occupational Stress Questionnaire     Feeling of Stress : Not at all   Social Connections: Socially Isolated (3/7/2022)    Social Connection and Isolation Panel [NHANES]     Frequency of Communication with Friends and Family: More than three times a week     Frequency of Social Gatherings with Friends and Family: More than three times a week     Attends Denominational Services: Never      Active Member of Clubs or Organizations: No     Attends Club or Organization Meetings: Never     Marital Status:    Intimate Partner Violence: Not At Risk (5/28/2024)    Humiliation, Afraid, Rape, and Kick questionnaire     Fear of Current or Ex-Partner: No     Emotionally Abused: No     Physically Abused: No     Sexually Abused: No   Housing Stability: Low Risk  (5/28/2024)    Housing Stability Vital Sign     Unable to Pay for Housing in the Last Year: No     Number of Times Moved in the Last Year: 1     Homeless in the Last Year: No     Presently lives with self.  Patient is single.  Patient is currently employed   Allergies   Allergen Reactions    Latex      Other reaction(s): swollen sore sensation       Current Outpatient Medications:     calcitriol (ROCALTROL) 0.25 mcg capsule, Take 1 capsule (0.25 mcg total) by mouth 2 (two) times a day, Disp: 180 capsule, Rfl: 1    calcium carbonate (OS-DENNIS) 600 MG tablet, Take 2 tablets (1,200 mg total) by mouth 2 (two) times a day with meals, Disp: , Rfl:     ergocalciferol (VITAMIN D2) 50,000 units, TAKE 1 CAPSULE (50,000 UNITS TOTAL) BY MOUTH ONCE A WEEK FOR 12 DOSES, Disp: 12 capsule, Rfl: 0    Synthroid 150 MCG tablet, Take 1 tablet daily Mon-Sat and 1.5 tablets on Sunday. Brand necessary, Disp: 98 tablet, Rfl: 1    ergocalciferol (VITAMIN D2) 50,000 units, Take 1 capsule (50,000 Units total) by mouth once a week for 12 doses, Disp: 12 capsule, Rfl: 0    valACYclovir (VALTREX) 500 mg tablet, Take 1 tablet (500 mg total) by mouth daily, Disp: 90 tablet, Rfl: 0    Review of Systems:    Review of Systems  Constitutional :no fever, feels well, no tiredness, no recent weight gain or loss  ENT: no ear ache, no loss of hearing, no nosebleeds or nasal discharge, no sore throat or hoarseness.  Cardiovascular: no complaints of slow or fast heart beat, no chest pain, no palpitations, no leg claudication or lower extremity edema.  Respiratory: no complaints of shortness of  "shortness of breath, no CHEUNG  Breasts:no complaints of breast pain, breast lump, or nipple discharge  Gastrointestinal: no complaints of abdominal pain, constipation, nausea, vomiting, or diarrhea or bloody stools  Genitourinary : no complaints of dysuria, incontinence, pelvic pain, no dysmenorrhea, vaginal discharge or abnormal vaginal bleeding and as noted in HPI.  Musculoskeletal: no complaints of arthralgia, no myalgia, no joint swelling or stiffness, no limb pain or swelling.  Integumentary: no complaints of skin rash or lesion, itching or dry skin  Neurological: no complaints of headache, no confusion, no numbness or tingling, no dizziness or fainting    Objective      /84 (BP Location: Right arm, Patient Position: Sitting, Cuff Size: Standard)   Pulse 70   Resp 16   Ht 5' 8\" (1.727 m)   Wt 118 kg (261 lb)   LMP 05/15/2024   BMI 39.68 kg/m²     General:   appears stated age, cooperative, alert normal mood and affect   Neck: normal, supple,trachea midline, no masses   Heart: regular rate and rhythm, S1, S2 normal, no murmur, click, rub or gallop   Lungs: clear to auscultation bilaterally   Breasts: normal appearance, no masses or tenderness, Inspection negative, No nipple retraction or dimpling, No nipple discharge or bleeding, No axillary or supraclavicular adenopathy, Normal to palpation without dominant masses, Taught monthly breast self examination   Abdomen: soft, non-tender, without masses or organomegaly   Vulva: normal female genitalia, Bartholin's, Urethra, [place] normal. Perirectal area Rt side  skin tag, flat and peduculated   Vagina: normal vagina, no discharge, exudate, lesion, or erythema   Urethra: normal   Cervix: Normal, no discharge. Nontender.   Uterus: normal size, contour, position, consistency, mobility, non-tender and midline, deep   Adnexa: normal adnexa and no mass, fullness, tenderness   Lymphatic palpation of lymph nodes in neck, axilla, groin and/or other locations: no " lymphadenopathy or masses noted   Skin normal skin turgor and no rashes.   Psychiatric orientation to person, place, and time: normal. mood and affect: normal

## 2024-06-07 ENCOUNTER — TELEPHONE (OUTPATIENT)
Age: 44
End: 2024-06-07

## 2024-06-07 NOTE — TELEPHONE ENCOUNTER
Discharge instruction given by provider   Patient able to ambulate out with parents at side,      Madeline Ortiz RN  06/25/18 [id number] Pt requested a yellow fever and malaria vaccine as she is traveling to [place] on 8/5/24          Please contact pt and advise whether or not PCP approves. Thank you for your help.

## 2024-06-13 ENCOUNTER — TELEPHONE (OUTPATIENT)
Age: 44
End: 2024-06-13

## 2024-06-13 NOTE — TELEPHONE ENCOUNTER
Pt called in to schedule an ID travel consult , Patient will be traveling 8/5/24 to [place] , needs the yellow fever vaccine and malaria .    Please advise if the patient can be seen in July

## 2024-06-13 NOTE — TELEPHONE ENCOUNTER
Patient calling back stating she needs to get a Visa before travelling and she will need to get her vaccines before she can even go for the Visa. She is asking if there is any way we can have her be seen or put on a cancellation list so she can get this done ASAP as she does not want to keep moving her travel date. Please advise.

## 2024-06-27 PROBLEM — Z01.419 ENCOUNTER FOR GYNECOLOGICAL EXAMINATION WITHOUT ABNORMAL FINDING: Status: RESOLVED | Noted: 2024-05-28 | Resolved: 2024-06-27

## 2024-07-22 ENCOUNTER — OFFICE VISIT (OUTPATIENT)
Dept: URGENT CARE | Facility: CLINIC | Age: 44
End: 2024-07-22
Payer: COMMERCIAL

## 2024-07-22 VITALS
SYSTOLIC BLOOD PRESSURE: 116 MMHG | RESPIRATION RATE: 16 BRPM | HEIGHT: 68 IN | OXYGEN SATURATION: 98 % | WEIGHT: 261 LBS | TEMPERATURE: 97.7 F | HEART RATE: 68 BPM | BODY MASS INDEX: 39.56 KG/M2 | DIASTOLIC BLOOD PRESSURE: 77 MMHG

## 2024-07-22 DIAGNOSIS — L23.7 POISON IVY: Primary | ICD-10-CM

## 2024-07-22 PROCEDURE — S9083 URGENT CARE CENTER GLOBAL: HCPCS | Performed by: NURSE PRACTITIONER

## 2024-07-22 PROCEDURE — G0382 LEV 3 HOSP TYPE B ED VISIT: HCPCS | Performed by: NURSE PRACTITIONER

## 2024-07-22 RX ORDER — PREDNISONE 10 MG/1
10 TABLET ORAL DAILY
Qty: 21 TABLET | Refills: 0 | Status: SHIPPED | OUTPATIENT
Start: 2024-07-22

## 2024-07-22 NOTE — PROGRESS NOTES
[facility]        NAME: Kateryna Parks is a 44 y.o. female  : 1980    MRN: [id number]  DATE: 2024  TIME: 9:14 AM    Assessment and Plan   Poison ivy [L23.7]  1. Poison ivy  predniSONE 10 mg tablet            Patient Instructions   Prednisone taper as directed  Cool compressed to the area  Avoid scratching   Benadryl as needed for itching- may cause drowsiness  Follow up with your PCP as needed     Follow up with PCP in 3-5 days.  Proceed to  ER if symptoms worsen.    Chief Complaint     Chief Complaint   Patient presents with    Poison Ivy     Pt reports that she has poison on her eye and at her groin area. She was pulling weeds.         History of Present Illness       Patient is a 44-year-old female presenting with suspected poison ivy rash of the right eye and genital region.  She states that she was pulling weeds yesterday.  She did wash her hands immediately after but woke up today with rash and swelling of her right eye.    Poison [place]  Pertinent negatives include no fever.       Review of Systems   Review of Systems   Constitutional:  Negative for activity change, chills and fever.   HENT:  Positive for facial swelling.    Skin:  Positive for rash.         Current Medications       Current Outpatient Medications:     calcitriol (ROCALTROL) 0.25 mcg capsule, Take 1 capsule (0.25 mcg total) by mouth 2 (two) times a day, Disp: 180 capsule, Rfl: 1    calcium carbonate (OS-DENNIS) 600 MG tablet, Take 2 tablets (1,200 mg total) by mouth 2 (two) times a day with meals, Disp: , Rfl:     predniSONE 10 mg tablet, Take 1 tablet (10 mg total) by mouth daily 60mg days 1, 50mg day 2, 40mg day 3, 30mg day 4, 20 mg day 5, 10mg day 6., Disp: 21 tablet, Rfl: 0    Synthroid 150 MCG tablet, Take 1 tablet daily Mon-Sat and 1.5 tablets on . Brand necessary, Disp: 98 tablet, Rfl: 1    ergocalciferol (VITAMIN D2) 50,000 units, Take 1 capsule (50,000 Units total) by mouth once a week for 12 doses, Disp:  "12 capsule, Rfl: 0    ergocalciferol (VITAMIN D2) 50,000 units, TAKE 1 CAPSULE (50,000 UNITS TOTAL) BY MOUTH ONCE A WEEK FOR 12 DOSES, Disp: 12 capsule, Rfl: 0    valACYclovir (VALTREX) 500 mg tablet, Take 1 tablet (500 mg total) by mouth daily, Disp: 90 tablet, Rfl: 0    Current Allergies     Allergies as of 07/22/2024 - Reviewed 07/22/2024   Allergen Reaction Noted    Latex  06/05/2015            The following portions of the patient's history were reviewed and updated as appropriate: allergies, current medications, past family history, past medical history, past social history, past surgical history and problem list.     Past Medical History:   Diagnosis Date    Anemia     H/O total thyroidectomy 10/2016    History of parathyroidectomy 10/2015    Hypothyroidism        Past Surgical History:   Procedure Laterality Date    PARATHYROIDECTOMY  10/2015    TOTAL THYROIDECTOMY  10/2015       Family History   Problem Relation Age of Onset    Hypothyroidism Mother     Lupus Mother     No Known Problems Father     Thyroid disease Sister     Thyroid disease Sister     Thyroid disease Sister     No Known Problems Sister     Hypothyroidism Maternal Grandmother     Diabetes type II Maternal Grandmother     Lupus Maternal Grandmother     Breast cancer Family     No Known Problems Maternal Aunt     No Known Problems Maternal Aunt     No Known Problems Paternal Aunt     Colon cancer Neg Hx     Ovarian cancer Neg Hx          Medications have been verified.        Objective   /77   Pulse 68   Temp 97.7 °F (36.5 °C)   Resp 16   Ht 5' 8\" (1.727 m)   Wt 118 kg (261 lb)   SpO2 98%   BMI 39.68 kg/m²        Physical Exam     Physical Exam  Vitals reviewed.   Constitutional:       General: She is awake. She is not in acute distress.     Appearance: Normal appearance.   Eyes:      Comments: Right upper eyelid swelling along the medial aspect.   Cardiovascular:      Rate and Rhythm: Normal rate.   Pulmonary:      Effort: " Pulmonary effort is normal.   Skin:     General: Skin is warm and moist.   Neurological:      General: No focal deficit present.      Mental Status: She is alert and oriented to person, place, and time.   Psychiatric:         Behavior: Behavior is cooperative.

## 2024-07-22 NOTE — PATIENT INSTRUCTIONS
Prednisone taper as directed  Cool compressed to the area  Avoid scratching   Benadryl as needed for itching- may cause drowsiness  Follow up with your PCP as needed     Patient Education     Poison Ivy   What is this product used for?   The use of poison ivy for any health condition is not supported.  What are the precautions when taking this product?   Always check with your doctor before you use a natural product. Some products may not mix well with drugs or other natural products.  Do not swallow this product. Serious side effects, including death, may happen if you are very allergic to poison ivy, cashews, or mangoes.  Do not use this product if you are pregnant or breastfeeding.  What should I watch for?   Rash or blisters  Mouth or throat irritation  When do I need to call the doctor?   Signs of a very bad reaction. These include wheezing; chest tightness; fever; itching; bad cough; blue skin color; seizures; or swelling of face, lips, tongue, or throat. Go to the ER right away.  Very bad throwing up  Very bad loose stools  Last Reviewed Date   2022-04-13  Consumer Information Use and Disclaimer   This generalized information is a limited summary of diagnosis, treatment, and/or medication information. It is not meant to be comprehensive and should be used as a tool to help the user understand and/or assess potential diagnostic and treatment options. It does NOT include all information about conditions, treatments, medications, side effects, or risks that may apply to a specific patient. It is not intended to be medical advice or a substitute for the medical advice, diagnosis, or treatment of a health care provider based on the health care provider's examination and assessment of a patient’s specific and unique circumstances. Patients must speak with a health care provider for complete information about their health, medical questions, and treatment options, including any risks or benefits regarding use of  medications. This information does not endorse any treatments or medications as safe, effective, or approved for treating a specific patient. [employer] and its affiliates disclaim any warranty or liability relating to this information or the use thereof. The use of this information is governed by the Terms of Use, available at https://www.SezWho.com/en/know/clinical-effectiveness-terms   Copyright   Copyright © 2024 [employer], [employer] and its affiliates and/or licensors. All rights reserved.

## 2024-07-29 ENCOUNTER — OFFICE VISIT (OUTPATIENT)
Dept: DERMATOLOGY | Facility: CLINIC | Age: 44
End: 2024-07-29
Payer: COMMERCIAL

## 2024-07-29 VITALS — HEIGHT: 69 IN | BODY MASS INDEX: 39.11 KG/M2

## 2024-07-29 DIAGNOSIS — D48.5 NEOPLASM OF UNCERTAIN BEHAVIOR OF SKIN: Primary | ICD-10-CM

## 2024-07-29 DIAGNOSIS — K64.4 SKIN TAG OF PERIANAL REGION: ICD-10-CM

## 2024-07-29 PROCEDURE — 99214 OFFICE O/P EST MOD 30 MIN: CPT | Performed by: NURSE PRACTITIONER

## 2024-07-29 PROCEDURE — 88342 IMHCHEM/IMCYTCHM 1ST ANTB: CPT | Performed by: STUDENT IN AN ORGANIZED HEALTH CARE EDUCATION/TRAINING PROGRAM

## 2024-07-29 PROCEDURE — 88305 TISSUE EXAM BY PATHOLOGIST: CPT | Performed by: STUDENT IN AN ORGANIZED HEALTH CARE EDUCATION/TRAINING PROGRAM

## 2024-07-29 PROCEDURE — 88341 IMHCHEM/IMCYTCHM EA ADD ANTB: CPT | Performed by: STUDENT IN AN ORGANIZED HEALTH CARE EDUCATION/TRAINING PROGRAM

## 2024-07-29 PROCEDURE — 11102 TANGNTL BX SKIN SINGLE LES: CPT | Performed by: NURSE PRACTITIONER

## 2024-07-29 NOTE — PROGRESS NOTES
"[facility] Dermatology Clinic Note     Patient Name: Kateryna Parks  Encounter Date: 7/29/24     Have you been cared for by a [facility] Dermatologist in the last 3 years and, if so, which description applies to you?    Yes.  I have been here within the last 3 years, and my medical history has NOT changed since that time.  I am FEMALE/of child-bearing potential.    REVIEW OF SYSTEMS:  Have you recently had or currently have any of the following? No changes in my recent health.   PAST MEDICAL HISTORY:  Have you personally ever had or currently have any of the following?  If \"YES,\" then please provide more detail. No changes in my medical history.   HISTORY OF IMMUNOSUPPRESSION: Do you have a history of any of the following:  Systemic Immunosuppression such as Diabetes, Biologic or Immunotherapy, Chemotherapy, Organ Transplantation, Bone Marrow Transplantation?  No     Answering \"YES\" requires the addition of the dotphrase \"IMMUNOSUPPRESSED\" as the first diagnosis of the patient's visit.   FAMILY HISTORY:  Any \"first degree relatives\" (parent, brother, sister, or child) with the following?    No changes in my family's known health.   PATIENT EXPERIENCE:    Do you want the Dermatologist to perform a COMPLETE skin exam today including a clinical examination under the \"bra and underwear\" areas?  NO  If necessary, do we have your permission to call and leave a detailed message on your Preferred Phone number that includes your specific medical information?  Yes      Allergies   Allergen Reactions    Latex      Other reaction(s): swollen sore sensation      Current Outpatient Medications:     calcitriol (ROCALTROL) 0.25 mcg capsule, Take 1 capsule (0.25 mcg total) by mouth 2 (two) times a day, Disp: 180 capsule, Rfl: 1    calcium carbonate (OS-DENNIS) 600 MG tablet, Take 2 tablets (1,200 mg total) by mouth 2 (two) times a day with meals, Disp: , Rfl:     ergocalciferol (VITAMIN D2) 50,000 units, TAKE 1 CAPSULE (50,000 UNITS " "TOTAL) BY MOUTH ONCE A WEEK FOR 12 DOSES, Disp: 12 capsule, Rfl: 0    Synthroid 150 MCG tablet, Take 1 tablet daily Mon-Sat and 1.5 tablets on Sunday. Brand necessary, Disp: 98 tablet, Rfl: 1    ergocalciferol (VITAMIN D2) 50,000 units, Take 1 capsule (50,000 Units total) by mouth once a week for 12 doses, Disp: 12 capsule, Rfl: 0    predniSONE 10 mg tablet, Take 1 tablet (10 mg total) by mouth daily 60mg days 1, 50mg day 2, 40mg day 3, 30mg day 4, 20 mg day 5, 10mg day 6. (Patient not taking: Reported on 7/29/2024), Disp: 21 tablet, Rfl: 0    valACYclovir (VALTREX) 500 mg tablet, Take 1 tablet (500 mg total) by mouth daily, Disp: 90 tablet, Rfl: 0          Whom besides the patient is providing clinical information about today's encounter?   NO ADDITIONAL HISTORIAN (patient alone provided history)    Physical Exam and Assessment/Plan by Diagnosis:    NEOPLASM OF UNCERTAIN BEHAVIOR OF SKIN    Physical Exam:  (Anatomic Location); (Size and Morphological Description); (Differential Diagnosis):  Specimen A; skin; clip; rectum; 1.0 cm; pedunculated papule Ddx: r/o genital wart        Additional History of Present Condition:   Patient presents for a growth in between the buttocks that is irritated and bleeds when she gets waxed. She states it has been there for about 10 years.     Assessment and Plan:    I have discussed with the patient that a sample of skin via a \"skin biopsy” would be potentially helpful to further make a specific diagnosis under the microscope.  Based on a thorough discussion of this condition and the management approach to it (including a comprehensive discussion of the known risks, side effects and potential benefits of treatment), the patient (family) agrees to implement the following specific plan:    Procedure:  Skin Biopsy.  After a thorough discussion of treatment options and risk/benefits/alternatives (including but not limited to local pain, scarring, dyspigmentation, blistering, possible " superinfection, and inability to confirm a diagnosis via histopathology), verbal and written consent were obtained and portion of the rash was biopsied for tissue sample.  See below for consent that was obtained from patient and subsequent Procedure Note.     PROCEDURE TANGENTIAL (SHAVE) BIOPSY NOTE:    Performing Physician:  Prasanth MURRY  Anatomic Location; Clinical Description with size (cm); Pre-Op Diagnosis:   Specimen A; clip; skin; rectum; 1.0 cm; pedunculated papule DDx; genital warts  Post-op diagnosis: Same     Local anesthesia: 3:1 1% xylocaine with epi and 1-100,000 buffered     Topical anesthesia: None    Hemostasis: Aluminum chloride       After obtaining informed consent  at which time there was a discussion about the purpose of biopsy  and low risks of infection and bleeding.  The area was prepped and draped in the usual fashion. Anesthesia was obtained with 1% lidocaine with epinephrine. A shave biopsy to an appropriate sampling depth was obtained by Shave (Dermablade or 15 blade) The resulting wound was covered with surgical ointment and bandaged appropriately.     The patient tolerated the procedure well without complications and was without signs of functional compromise.      Specimen has been sent for review by Dermatopathology.    Standard post-procedure care has been explained and has been included in written form within the patient's copy of Informed Consent.    INFORMED CONSENT DISCUSSION AND POST-OPERATIVE INSTRUCTIONS FOR PATIENT    I.  RATIONALE FOR PROCEDURE  I understand that a skin biopsy allows the Dermatologist to test a lesion or rash under the microscope to obtain a diagnosis.  It usually involves numbing the area with numbing medication and removing a small piece of skin; sometimes the area will be closed with sutures. In this specific procedure, sutures are not usually needed.  If any sutures are placed, then they are usually need to be removed in 2 weeks or less.    I  "understand that my Dermatologist recommends that a skin \"shave\" biopsy be performed today.  A local anesthetic, similar to the kind that a dentist uses when filling a cavity, will be injected with a very small needle into the skin area to be sampled.  The injected skin and tissue underneath \"will go to sleep” and become numb so no pain should be felt afterwards.  An instrument shaped like a tiny \"razor blade\" (shave biopsy instrument) will be used to cut a small piece of tissue and skin from the area so that a sample of tissue can be taken and examined more closely under the microscope.  A slight amount of bleeding will occur, but it will be stopped with direct pressure and a pressure bandage and any other appropriate methods.  I understands that a scar will form where the wound was created.  Surgical ointment will be applied to help protect the wound.  Sutures are not usually needed.    II.  RISKS AND POTENTIAL COMPLICATIONS   I understand the risks and potential complications of a skin biopsy include but are not limited to the following:  Bleeding  Infection  Pain  Scar/keloid  Skin discoloration  Incomplete Removal  Recurrence  Nerve Damage/Numbness/Loss of Function  Allergic Reaction to Anesthesia  Biopsies are diagnostic procedures and based on findings additional treatment or evaluation may be required  Loss or destruction of specimen resulting in no additional findings    My Dermatologist has explained to me the nature of the condition, the nature of the procedure, and the benefits to be reasonably expected compared with alternative approaches.  My Dermatologist has discussed the likelihood of major risks or complications of this procedure including the specific risks listed above, such as bleeding, infection, and scarring/keloid.  I understand that a scar is expected after this procedure.  I understand that my physician cannot predict if the scar will form a \"keloid,\" which extends beyond the borders of the " "wound that is created.  A keloid is a thick, painful, and bumpy scar.  A keloid can be difficult to treat, as it does not always respond well to therapy, which includes injecting cortisone directly into the keloid every few weeks.  While this usually reduces the pain and size of the scar, it does not eliminate it.      I understand that photographs may be taken before and after the procedure.  These will be maintained as part of the medical providers confidential records and may not be made available to me.  I further authorize the medical provider to use the photographs for teaching purposes or to illustrate scientific papers, books, or lectures if in his/her judgment, medical research, education, or science may benefit from its use.    I have had an opportunity to fully inquire about the risks and benefits of this procedure and its alternatives.   I have been given ample time and opportunity to ask questions and to seek a second opinion if I wished to do so.  I acknowledge that there have specifically been no guarantees as to the cosmetic results from the procedure.  I am aware that with any procedure there is always the possibility of an unexpected complication.    III. POST-PROCEDURAL CARE (WHAT YOU WILL NEED TO DO \"AFTER THE BIOPSY\" TO OPTIMIZE HEALING)    Keep the area clean and dry.  Try NOT to remove the bandage or get it wet for the first 24 hours.    Gently clean the area and apply surgical ointment (such as Vaseline petrolatum ointment, which is available \"over the counter\" and not a prescription) to the biopsy site for up to 2 weeks straight.  This acts to protect the wound from the outside world.      Sutures are not usually placed in this procedure.  If any sutures were placed, return for suture removal as instructed (generally 1 week for the face, 2 weeks for the body).      Take Acetaminophen (Tylenol) for discomfort, if no contraindications.  Ibuprofen or aspirin could make bleeding worse.    Call " our office immediately for signs of infection: fever, chills, increased redness, warmth, tenderness, discomfort/pain, or pus or foul smell coming from the wound.    WHAT TO DO IF THERE IS ANY BLEEDING?  If a small amount of bleeding is noticed, place a clean cloth over the area and apply firm pressure for ten minutes.  Check the wound after 10 minutes of direct pressure.  If bleeding persists, try one more time for an additional 10 minutes of direct pressure on the area.  If the bleeding becomes heavier or does not stop after the second attempt, or if you have any other questions about this procedure, then please call your [facility]'s Dermatologist by calling [phone] (SKIN).     I hereby acknowledge that I have reviewed and verified the site with my Dermatologist and have requested and authorized my Dermatologist to proceed with the procedure.         Scribe Attestation      I,:  Mica Jorge MA am acting as a scribe while in the presence of the attending physician.:       I,:  LOVELY Estrada personally performed the services described in this documentation    as scribed in my presence.:

## 2024-07-29 NOTE — PATIENT INSTRUCTIONS
"INFORMED CONSENT DISCUSSION AND POST-OPERATIVE INSTRUCTIONS FOR PATIENT    I.  RATIONALE FOR PROCEDURE  I understand that a skin biopsy allows the Dermatologist to test a lesion or rash under the microscope to obtain a diagnosis.  It usually involves numbing the area with numbing medication and removing a small piece of skin; sometimes the area will be closed with sutures. In this specific procedure, sutures are not usually needed.  If any sutures are placed, then they are usually need to be removed in 2 weeks or less.    I understand that my Dermatologist recommends that a skin \"shave\" biopsy be performed today.  A local anesthetic, similar to the kind that a dentist uses when filling a cavity, will be injected with a very small needle into the skin area to be sampled.  The injected skin and tissue underneath \"will go to sleep” and become numb so no pain should be felt afterwards.  An instrument shaped like a tiny \"razor blade\" (shave biopsy instrument) will be used to cut a small piece of tissue and skin from the area so that a sample of tissue can be taken and examined more closely under the microscope.  A slight amount of bleeding will occur, but it will be stopped with direct pressure and a pressure bandage and any other appropriate methods.  I understands that a scar will form where the wound was created.  Surgical ointment will be applied to help protect the wound.  Sutures are not usually needed.    II.  RISKS AND POTENTIAL COMPLICATIONS   I understand the risks and potential complications of a skin biopsy include but are not limited to the following:  Bleeding  Infection  Pain  Scar/keloid  Skin discoloration  Incomplete Removal  Recurrence  Nerve Damage/Numbness/Loss of Function  Allergic Reaction to Anesthesia  Biopsies are diagnostic procedures and based on findings additional treatment or evaluation may be required  Loss or destruction of specimen resulting in no additional findings    My Dermatologist " "has explained to me the nature of the condition, the nature of the procedure, and the benefits to be reasonably expected compared with alternative approaches.  My Dermatologist has discussed the likelihood of major risks or complications of this procedure including the specific risks listed above, such as bleeding, infection, and scarring/keloid.  I understand that a scar is expected after this procedure.  I understand that my physician cannot predict if the scar will form a \"keloid,\" which extends beyond the borders of the wound that is created.  A keloid is a thick, painful, and bumpy scar.  A keloid can be difficult to treat, as it does not always respond well to therapy, which includes injecting cortisone directly into the keloid every few weeks.  While this usually reduces the pain and size of the scar, it does not eliminate it.      I understand that photographs may be taken before and after the procedure.  These will be maintained as part of the medical providers confidential records and may not be made available to me.  I further authorize the medical provider to use the photographs for teaching purposes or to illustrate scientific papers, books, or lectures if in his/her judgment, medical research, education, or science may benefit from its use.    I have had an opportunity to fully inquire about the risks and benefits of this procedure and its alternatives.   I have been given ample time and opportunity to ask questions and to seek a second opinion if I wished to do so.  I acknowledge that there have specifically been no guarantees as to the cosmetic results from the procedure.  I am aware that with any procedure there is always the possibility of an unexpected complication.    III. POST-PROCEDURAL CARE (WHAT YOU WILL NEED TO DO \"AFTER THE BIOPSY\" TO OPTIMIZE HEALING)    Keep the area clean and dry.  Try NOT to remove the bandage or get it wet for the first 24 hours.    Gently clean the area and apply " "surgical ointment (such as Vaseline petrolatum ointment, which is available \"over the counter\" and not a prescription) to the biopsy site for up to 2 weeks straight.  This acts to protect the wound from the outside world.      Sutures are not usually placed in this procedure.  If any sutures were placed, return for suture removal as instructed (generally 1 week for the face, 2 weeks for the body).      Take Acetaminophen (Tylenol) for discomfort, if no contraindications.  Ibuprofen or aspirin could make bleeding worse.    Call our office immediately for signs of infection: fever, chills, increased redness, warmth, tenderness, discomfort/pain, or pus or foul smell coming from the wound.    WHAT TO DO IF THERE IS ANY BLEEDING?  If a small amount of bleeding is noticed, place a clean cloth over the area and apply firm pressure for ten minutes.  Check the wound after 10 minutes of direct pressure.  If bleeding persists, try one more time for an additional 10 minutes of direct pressure on the area.  If the bleeding becomes heavier or does not stop after the second attempt, or if you have any other questions about this procedure, then please call your [facility]'s Dermatologist by calling [phone] (SKIN).     I hereby acknowledge that I have reviewed and verified the site with my Dermatologist and have requested and authorized my Dermatologist to proceed with the procedure.         "

## 2024-07-31 PROCEDURE — 88341 IMHCHEM/IMCYTCHM EA ADD ANTB: CPT | Performed by: STUDENT IN AN ORGANIZED HEALTH CARE EDUCATION/TRAINING PROGRAM

## 2024-07-31 PROCEDURE — 88305 TISSUE EXAM BY PATHOLOGIST: CPT | Performed by: STUDENT IN AN ORGANIZED HEALTH CARE EDUCATION/TRAINING PROGRAM

## 2024-07-31 PROCEDURE — 88342 IMHCHEM/IMCYTCHM 1ST ANTB: CPT | Performed by: STUDENT IN AN ORGANIZED HEALTH CARE EDUCATION/TRAINING PROGRAM

## 2024-08-05 DIAGNOSIS — A63.0 ANOGENITAL WARTS IN FEMALE: Primary | ICD-10-CM

## 2024-08-05 NOTE — RESULT ENCOUNTER NOTE
DERMATOPATHOLOGY RESULT NOTE    Results reviewed by ordering physician.  Called patient to personally discuss results on 8/2/24.       Instructions for Clinical Derm Team:   (remember to route Result Note to appropriate staff):    None    Result & Plan by Specimen:    Specimen A: benign  Plan: referral placed to both gen surg and colorectal to determine appropriate removal      [id number]  Order: [id number]   Status: Edited Result - FINAL      Visible to patient: Yes (not seen)      Dx: Neoplasm of uncertain behavior of skin    1 Result Note      1 Follow-up Encounter     Component   Case Report  Surgical Pathology Report                         Case: [id number]                                  Authorizing Provider:  LOVELY Estrada Collected:           07/29/2024 0830              Ordering Location:     [facility] Derm[facility]      Received:            07/29/2024 0830                                     [place]                                                                      Pathologist:           Doreen Henning MD                                                          Specimen:    Skin, Other, Specimen A; rectum                                                          Addendum  SOX10 and p16 immunostains were reviewed and support the diagnosis. Multiple levels examined. The final diagnosis remains unchanged.  Addendum electronically signed by Doreen Henning MD on 8/1/2024 at  9:49 AM  Final Diagnosis  A. Skin, rectum, biopsy:    Consistent with CONDYLOMA ACUMINATUM; transected (see note).    Note: Because the base of the lesion is not well visualized, a more significant lesion cannot be excluded. If the lesion were to progress or persist, additional sampling should be sought.    Electronically signed by Doreen Henning MD on 7/31/2024 at  2:22 PM  Additional Information   All reported additional testing was performed with appropriately reactive controls.  These tests were developed and their  "performance characteristics determined by [facility] or appropriate performing facility, though some tests may be performed on tissues which have not been validated for performance characteristics (such as staining performed on alcohol exposed cell blocks and decalcified tissues).  Results should be interpreted with caution and in the context of the patients' clinical condition. These tests may not be cleared or approved by the U.S. Food and Drug Administration, though the FDA has determined that such clearance or approval is not necessary. These tests are used for clinical purposes and they should not be regarded as investigational or for research. This laboratory has been approved by CLIA 88, designated as a high-complexity laboratory and is qualified to perform these tests.  .  Gross Description   A. The specimen is received in formalin, labeled with the patient's name and hospital number, and is designated \" rectum skin shave\".  The specimen consists of 2 tan-gray wrinkled fragments of skin measuring 0.3 and 0.5 cm.  The margin of resection is identified on the larger fragment and is inked green.  Entirely submitted. One cassette.  Between sponges.    Note: The estimated total formalin fixation time based upon information provided by the submitting clinician and the standard processing schedule is under 72 hours.    [facility]  Clinical Information   Specimen A; 43 yo female; skin; shave; skin; rectum; 1.0 cm; pedunculated papule; Ddx: rule out genital wart    ATTN DERM PATH  Resulting Agency [id number]      Specimen Collected: 07/29/24  8:30 AM Last Resulted: 08/01/24  9:49 AM          "

## 2024-08-12 DIAGNOSIS — E55.9 VITAMIN D DEFICIENCY: ICD-10-CM

## 2024-08-12 DIAGNOSIS — E89.0 POSTOPERATIVE HYPOTHYROIDISM: ICD-10-CM

## 2024-08-12 NOTE — TELEPHONE ENCOUNTER
Patient  needs refill on     Synthroid 150 mcg    Vitamin D2 50,000 units      Send to [facility] in [address]

## 2024-08-13 RX ORDER — LEVOTHYROXINE SODIUM 150 MCG
TABLET ORAL
Qty: 98 TABLET | Refills: 1 | Status: SHIPPED | OUTPATIENT
Start: 2024-08-13

## 2024-08-13 RX ORDER — ERGOCALCIFEROL 1.25 MG/1
50000 CAPSULE, LIQUID FILLED ORAL WEEKLY
Qty: 12 CAPSULE | Refills: 0 | Status: SHIPPED | OUTPATIENT
Start: 2024-08-13 | End: 2024-10-30

## 2024-08-26 ENCOUNTER — OFFICE VISIT (OUTPATIENT)
Dept: SURGERY | Facility: CLINIC | Age: 44
End: 2024-08-26
Payer: COMMERCIAL

## 2024-08-26 VITALS
BODY MASS INDEX: 40.01 KG/M2 | HEART RATE: 96 BPM | SYSTOLIC BLOOD PRESSURE: 114 MMHG | WEIGHT: 264 LBS | TEMPERATURE: 97.3 F | HEIGHT: 68 IN | OXYGEN SATURATION: 99 % | DIASTOLIC BLOOD PRESSURE: 82 MMHG

## 2024-08-26 DIAGNOSIS — A63.0 CONDYLOMATA ACUMINATA IN FEMALE: Primary | ICD-10-CM

## 2024-08-26 DIAGNOSIS — A63.0 ANOGENITAL WARTS IN FEMALE: ICD-10-CM

## 2024-08-26 PROCEDURE — 99202 OFFICE O/P NEW SF 15 MIN: CPT | Performed by: SURGERY

## 2024-08-26 PROCEDURE — 17110 DESTRUCTION B9 LES UP TO 14: CPT | Performed by: SURGERY

## 2024-08-26 PROCEDURE — 88305 TISSUE EXAM BY PATHOLOGIST: CPT | Performed by: PATHOLOGY

## 2024-08-26 NOTE — PATIENT INSTRUCTIONS
Patient may shower in the morning  Placing Neosporin on the area for couple days  Return if needed

## 2024-08-26 NOTE — LETTER
August 26, 2024     LOVELY Estrada  [address] [address]    Patient: Kateryna Parks   YOB: 1980   Date of Visit: 8/26/2024       Dear Dr. Man:    Thank you for referring Kateryna Parks to me for evaluation. Below are my notes for this consultation.    If you have questions, please do not hesitate to call me. I look forward to following your patient along with you.         Sincerely,        Robert Bloch, MD        CC: Racquelle Kerrian Thomas, CRNP Robert Bloch, MD  8/26/2024  3:51 PM  Signed  The patient is a 44-year-old female who was sent in by dermatology because of a perirectal wart.  The patient was actually sent there after her gynecologist saw this.  The patient does have a history of 1 prior lesion in her vulva being removed.  She states that the area in the perirectal area has gotten larger over the years and she wishes to have it removed.  There is no history of other HPV infection.  She has not had sexual relations in 2 to 2-1/2 years.  On examination there is about a 13 mm raised fleshy area which looks most like a condylomata.  It could technically also be a keratotic lesion but that is highly unusual.  This is about a centimeter and 1/2 to 2 cm from the anal opening at the 3 o'clock position with upper midline being 12:00.  This was removed in the office here today.  Lesion Destruction    Date/Time: 8/26/2024 3:30 PM    Performed by: Robert Bloch, MD  Authorized by: Robert Bloch, MD  Universal Protocol:  Consent: Written consent obtained.  Patient identity confirmed: verbally with patient    Procedure Details - Lesion Destruction:     Number of Lesions:  1  Lesion 1:     Body area:  Anogenital    Anogenital location:  Perianal    Initial size (mm):  13    Malignancy: benign lesion      Destruction method: electrosurgery       Patient was identified by me and placed in the prone position on the operating room table with her buttocks taped apart.   The area was now prepped and draped in a normal surgical manner.  Neutralized 1% lidocaine with epinephrine is infused and the mass is cut off sharply with the scissors.  Tiny bit of residual mass in the base was now cauterized.  Dressing was applied

## 2024-08-26 NOTE — PROGRESS NOTES
The patient is a 44-year-old female who was sent in by dermatology because of a perirectal wart.  The patient was actually sent there after her gynecologist saw this.  The patient does have a history of 1 prior lesion in her vulva being removed.  She states that the area in the perirectal area has gotten larger over the years and she wishes to have it removed.  There is no history of other HPV infection.  She has not had sexual relations in 2 to 2-1/2 years.  On examination there is about a 13 mm raised fleshy area which looks most like a condylomata.  It could technically also be a keratotic lesion but that is highly unusual.  This is about a centimeter and 1/2 to 2 cm from the anal opening at the 3 o'clock position with upper midline being 12:00.  This was removed in the office here today.  Lesion Destruction    Date/Time: 8/26/2024 3:30 PM    Performed by: Robert Bloch, MD  Authorized by: Robert Bloch, MD  Universal Protocol:  Consent: Written consent obtained.  Patient identity confirmed: verbally with patient    Procedure Details - Lesion Destruction:     Number of Lesions:  1  Lesion 1:     Body area:  Anogenital    Anogenital location:  Perianal    Initial size (mm):  13    Malignancy: benign lesion      Destruction method: electrosurgery       Patient was identified by me and placed in the prone position on the operating room table with her buttocks taped apart.  The area was now prepped and draped in a normal surgical manner.  Neutralized 1% lidocaine with epinephrine is infused and the mass is cut off sharply with the scissors.  Tiny bit of residual mass in the base was now cauterized.  Dressing was applied

## 2024-09-03 PROCEDURE — 88305 TISSUE EXAM BY PATHOLOGIST: CPT | Performed by: PATHOLOGY

## 2025-01-05 ENCOUNTER — HOSPITAL ENCOUNTER (OUTPATIENT)
Facility: HOSPITAL | Age: 45
Setting detail: OBSERVATION
Discharge: HOME | End: 2025-01-06
Attending: EMERGENCY MEDICINE | Admitting: EMERGENCY MEDICINE
Payer: COMMERCIAL

## 2025-01-05 DIAGNOSIS — Z98.890 HISTORY OF PARATHYROIDECTOMY: ICD-10-CM

## 2025-01-05 DIAGNOSIS — E20.89 OTHER HYPOPARATHYROIDISM (HCC): ICD-10-CM

## 2025-01-05 DIAGNOSIS — E03.9 HYPOTHYROIDISM: Primary | ICD-10-CM

## 2025-01-05 DIAGNOSIS — E55.9 VITAMIN D DEFICIENCY: ICD-10-CM

## 2025-01-05 DIAGNOSIS — N17.9 AKI (ACUTE KIDNEY INJURY) (HCC): ICD-10-CM

## 2025-01-05 DIAGNOSIS — R60.9 EDEMA: ICD-10-CM

## 2025-01-05 DIAGNOSIS — Z90.89 HISTORY OF PARATHYROIDECTOMY: ICD-10-CM

## 2025-01-05 DIAGNOSIS — E20.9 HYPOPARATHYROIDISM, UNSPECIFIED HYPOPARATHYROIDISM TYPE (HCC): ICD-10-CM

## 2025-01-05 PROBLEM — D64.9 ANEMIA: Status: ACTIVE | Noted: 2025-01-05

## 2025-01-05 PROBLEM — E87.6 HYPOKALEMIA: Status: ACTIVE | Noted: 2025-01-05

## 2025-01-05 PROBLEM — N18.2 CKD (CHRONIC KIDNEY DISEASE) STAGE 2, GFR 60-89 ML/MIN: Status: ACTIVE | Noted: 2025-01-05

## 2025-01-05 LAB
25(OH)D3 SERPL-MCNC: 24 NG/ML (ref 30–100)
ALBUMIN SERPL BCG-MCNC: 4.4 G/DL (ref 3.5–5)
ALP SERPL-CCNC: 62 U/L (ref 34–104)
ALT SERPL W P-5'-P-CCNC: 27 U/L (ref 7–52)
ANION GAP SERPL CALCULATED.3IONS-SCNC: 10 MMOL/L (ref 4–13)
AST SERPL W P-5'-P-CCNC: 42 U/L (ref 13–39)
ATRIAL RATE: 98 BPM
BACTERIA UR QL AUTO: ABNORMAL /HPF
BASOPHILS # BLD AUTO: 0.07 THOUSANDS/ΜL (ref 0–0.1)
BASOPHILS NFR BLD AUTO: 1 % (ref 0–1)
BILIRUB SERPL-MCNC: 0.54 MG/DL (ref 0.2–1)
BILIRUB UR QL STRIP: NEGATIVE
BNP SERPL-MCNC: 9 PG/ML (ref 0–100)
BUN SERPL-MCNC: 20 MG/DL (ref 5–25)
CA-I BLD-SCNC: 0.96 MMOL/L (ref 1.12–1.32)
CALCIUM SERPL-MCNC: 7.9 MG/DL (ref 8.4–10.2)
CARDIAC TROPONIN I PNL SERPL HS: <2 NG/L (ref ?–50)
CHLORIDE SERPL-SCNC: 103 MMOL/L (ref 96–108)
CLARITY UR: CLEAR
CO2 SERPL-SCNC: 24 MMOL/L (ref 21–32)
COLOR UR: YELLOW
CREAT SERPL-MCNC: 1.41 MG/DL (ref 0.6–1.3)
EOSINOPHIL # BLD AUTO: 0.39 THOUSAND/ΜL (ref 0–0.61)
EOSINOPHIL NFR BLD AUTO: 5 % (ref 0–6)
ERYTHROCYTE [DISTWIDTH] IN BLOOD BY AUTOMATED COUNT: 16.2 % (ref 11.6–15.1)
EST. AVERAGE GLUCOSE BLD GHB EST-MCNC: 128 MG/DL
GFR SERPL CREATININE-BSD FRML MDRD: 45 ML/MIN/1.73SQ M
GLUCOSE SERPL-MCNC: 135 MG/DL (ref 65–140)
GLUCOSE UR STRIP-MCNC: NEGATIVE MG/DL
HBA1C MFR BLD: 6.1 %
HCT VFR BLD AUTO: 32.9 % (ref 34.8–46.1)
HGB BLD-MCNC: 10.8 G/DL (ref 11.5–15.4)
HGB UR QL STRIP.AUTO: NEGATIVE
IMM GRANULOCYTES # BLD AUTO: 0.09 THOUSAND/UL (ref 0–0.2)
IMM GRANULOCYTES NFR BLD AUTO: 1 % (ref 0–2)
INSULIN SERPL-ACNC: 57.38 UIU/ML (ref 1.9–23)
IRON SATN MFR SERPL: 11 % (ref 15–50)
IRON SERPL-MCNC: 49 UG/DL (ref 50–212)
KETONES UR STRIP-MCNC: NEGATIVE MG/DL
LEUKOCYTE ESTERASE UR QL STRIP: ABNORMAL
LYMPHOCYTES # BLD AUTO: 2.37 THOUSANDS/ΜL (ref 0.6–4.47)
LYMPHOCYTES NFR BLD AUTO: 28 % (ref 14–44)
MAGNESIUM SERPL-MCNC: 2.1 MG/DL (ref 1.9–2.7)
MCH RBC QN AUTO: 28.8 PG (ref 26.8–34.3)
MCHC RBC AUTO-ENTMCNC: 32.8 G/DL (ref 31.4–37.4)
MCV RBC AUTO: 88 FL (ref 82–98)
MONOCYTES # BLD AUTO: 0.53 THOUSAND/ΜL (ref 0.17–1.22)
MONOCYTES NFR BLD AUTO: 6 % (ref 4–12)
MUCOUS THREADS UR QL AUTO: ABNORMAL
NEUTROPHILS # BLD AUTO: 4.98 THOUSANDS/ΜL (ref 1.85–7.62)
NEUTS SEG NFR BLD AUTO: 59 % (ref 43–75)
NITRITE UR QL STRIP: NEGATIVE
NON-SQ EPI CELLS URNS QL MICRO: ABNORMAL /HPF
NRBC BLD AUTO-RTO: 0 /100 WBCS
P AXIS: 61 DEGREES
PH UR STRIP.AUTO: 6 [PH]
PHOSPHATE SERPL-MCNC: 4.9 MG/DL (ref 2.7–4.5)
PLATELET # BLD AUTO: 227 THOUSANDS/UL (ref 149–390)
PMV BLD AUTO: 10.1 FL (ref 8.9–12.7)
POTASSIUM SERPL-SCNC: 3.3 MMOL/L (ref 3.5–5.3)
PR INTERVAL: 150 MS
PROT SERPL-MCNC: 7.4 G/DL (ref 6.4–8.4)
PROT UR STRIP-MCNC: ABNORMAL MG/DL
PTH-INTACT SERPL-MCNC: 4.4 PG/ML (ref 12–88)
QRS AXIS: 115 DEGREES
QRSD INTERVAL: 74 MS
QT INTERVAL: 374 MS
QTC INTERVAL: 477 MS
RBC # BLD AUTO: 3.75 MILLION/UL (ref 3.81–5.12)
RBC #/AREA URNS AUTO: ABNORMAL /HPF
SODIUM SERPL-SCNC: 137 MMOL/L (ref 135–147)
SP GR UR STRIP.AUTO: 1.03 (ref 1–1.03)
T WAVE AXIS: 250 DEGREES
T4 FREE SERPL-MCNC: <0.25 NG/DL (ref 0.61–1.12)
TIBC SERPL-MCNC: 466.2 UG/DL (ref 250–450)
TRANSFERRIN SERPL-MCNC: 333 MG/DL (ref 203–362)
TSH SERPL DL<=0.05 MIU/L-ACNC: 107.21 UIU/ML (ref 0.45–4.5)
UIBC SERPL-MCNC: 417 UG/DL (ref 155–355)
UROBILINOGEN UR STRIP-ACNC: <2 MG/DL
VENTRICULAR RATE: 98 BPM
WBC # BLD AUTO: 8.43 THOUSAND/UL (ref 4.31–10.16)
WBC #/AREA URNS AUTO: ABNORMAL /HPF

## 2025-01-05 PROCEDURE — 83735 ASSAY OF MAGNESIUM: CPT | Performed by: EMERGENCY MEDICINE

## 2025-01-05 PROCEDURE — 84443 ASSAY THYROID STIM HORMONE: CPT | Performed by: EMERGENCY MEDICINE

## 2025-01-05 PROCEDURE — 99285 EMERGENCY DEPT VISIT HI MDM: CPT | Performed by: EMERGENCY MEDICINE

## 2025-01-05 PROCEDURE — 36415 COLL VENOUS BLD VENIPUNCTURE: CPT | Performed by: EMERGENCY MEDICINE

## 2025-01-05 PROCEDURE — 80053 COMPREHEN METABOLIC PANEL: CPT | Performed by: EMERGENCY MEDICINE

## 2025-01-05 PROCEDURE — 93005 ELECTROCARDIOGRAM TRACING: CPT

## 2025-01-05 PROCEDURE — 83970 ASSAY OF PARATHORMONE: CPT | Performed by: EMERGENCY MEDICINE

## 2025-01-05 PROCEDURE — 85025 COMPLETE CBC W/AUTO DIFF WBC: CPT | Performed by: EMERGENCY MEDICINE

## 2025-01-05 PROCEDURE — 83550 IRON BINDING TEST: CPT | Performed by: EMERGENCY MEDICINE

## 2025-01-05 PROCEDURE — 83036 HEMOGLOBIN GLYCOSYLATED A1C: CPT

## 2025-01-05 PROCEDURE — 84100 ASSAY OF PHOSPHORUS: CPT | Performed by: EMERGENCY MEDICINE

## 2025-01-05 PROCEDURE — 81001 URINALYSIS AUTO W/SCOPE: CPT

## 2025-01-05 PROCEDURE — 99283 EMERGENCY DEPT VISIT LOW MDM: CPT

## 2025-01-05 PROCEDURE — 82306 VITAMIN D 25 HYDROXY: CPT | Performed by: EMERGENCY MEDICINE

## 2025-01-05 PROCEDURE — 82330 ASSAY OF CALCIUM: CPT | Performed by: EMERGENCY MEDICINE

## 2025-01-05 PROCEDURE — 83525 ASSAY OF INSULIN: CPT | Performed by: EMERGENCY MEDICINE

## 2025-01-05 PROCEDURE — 99223 1ST HOSP IP/OBS HIGH 75: CPT | Performed by: INTERNAL MEDICINE

## 2025-01-05 PROCEDURE — 84484 ASSAY OF TROPONIN QUANT: CPT | Performed by: EMERGENCY MEDICINE

## 2025-01-05 PROCEDURE — 83540 ASSAY OF IRON: CPT | Performed by: EMERGENCY MEDICINE

## 2025-01-05 PROCEDURE — 84439 ASSAY OF FREE THYROXINE: CPT | Performed by: EMERGENCY MEDICINE

## 2025-01-05 PROCEDURE — 83880 ASSAY OF NATRIURETIC PEPTIDE: CPT

## 2025-01-05 RX ORDER — LEVOTHYROXINE SODIUM 150 UG/1
150 TABLET ORAL
Status: DISCONTINUED | OUTPATIENT
Start: 2025-01-05 | End: 2025-01-05

## 2025-01-05 RX ORDER — ERGOCALCIFEROL 1.25 MG/1
50000 CAPSULE, LIQUID FILLED ORAL WEEKLY
Status: DISCONTINUED | OUTPATIENT
Start: 2025-01-10 | End: 2025-01-05

## 2025-01-05 RX ORDER — CALCITRIOL 0.25 UG/1
0.25 CAPSULE, LIQUID FILLED ORAL 2 TIMES DAILY
Status: DISCONTINUED | OUTPATIENT
Start: 2025-01-05 | End: 2025-01-06 | Stop reason: HOSPADM

## 2025-01-05 RX ORDER — LEVOTHYROXINE SODIUM 100 UG/1
200 TABLET ORAL
Status: DISCONTINUED | OUTPATIENT
Start: 2025-01-05 | End: 2025-01-06 | Stop reason: HOSPADM

## 2025-01-05 RX ORDER — ENOXAPARIN SODIUM 100 MG/ML
40 INJECTION SUBCUTANEOUS EVERY 12 HOURS SCHEDULED
Status: DISCONTINUED | OUTPATIENT
Start: 2025-01-05 | End: 2025-01-06 | Stop reason: HOSPADM

## 2025-01-05 RX ORDER — LEVOTHYROXINE SODIUM 100 UG/1
200 TABLET ORAL
Status: DISCONTINUED | OUTPATIENT
Start: 2025-01-06 | End: 2025-01-05

## 2025-01-05 RX ORDER — CALCIUM CARBONATE 500 MG/1
1000 TABLET, CHEWABLE ORAL 2 TIMES DAILY
Status: DISCONTINUED | OUTPATIENT
Start: 2025-01-06 | End: 2025-01-05

## 2025-01-05 RX ORDER — POTASSIUM CHLORIDE 1500 MG/1
40 TABLET, EXTENDED RELEASE ORAL ONCE
Status: COMPLETED | OUTPATIENT
Start: 2025-01-05 | End: 2025-01-05

## 2025-01-05 RX ORDER — CALCIUM GLUCONATE 20 MG/ML
1 INJECTION, SOLUTION INTRAVENOUS ONCE
Status: COMPLETED | OUTPATIENT
Start: 2025-01-05 | End: 2025-01-05

## 2025-01-05 RX ORDER — CALCIUM CARBONATE 500 MG/1
1000 TABLET, CHEWABLE ORAL EVERY 12 HOURS SCHEDULED
Status: DISCONTINUED | OUTPATIENT
Start: 2025-01-05 | End: 2025-01-05

## 2025-01-05 RX ORDER — ENOXAPARIN SODIUM 100 MG/ML
40 INJECTION SUBCUTANEOUS
Status: DISCONTINUED | OUTPATIENT
Start: 2025-01-05 | End: 2025-01-05

## 2025-01-05 RX ORDER — BENZONATATE 100 MG/1
100 CAPSULE ORAL 3 TIMES DAILY PRN
Status: DISCONTINUED | OUTPATIENT
Start: 2025-01-05 | End: 2025-01-06 | Stop reason: HOSPADM

## 2025-01-05 RX ORDER — CALCIUM CARBONATE 500 MG/1
1000 TABLET, CHEWABLE ORAL
Status: DISCONTINUED | OUTPATIENT
Start: 2025-01-06 | End: 2025-01-06

## 2025-01-05 RX ADMIN — LEVOTHYROXINE SODIUM 200 MCG: 0.1 TABLET ORAL at 12:34

## 2025-01-05 RX ADMIN — CALCIUM GLUCONATE 1 G: 20 INJECTION, SOLUTION INTRAVENOUS at 12:03

## 2025-01-05 RX ADMIN — CALCITRIOL CAPSULES 0.25 MCG 0.25 MCG: 0.25 CAPSULE ORAL at 20:34

## 2025-01-05 RX ADMIN — ENOXAPARIN SODIUM 40 MG: 40 INJECTION SUBCUTANEOUS at 12:03

## 2025-01-05 RX ADMIN — CALCITRIOL CAPSULES 0.25 MCG 0.25 MCG: 0.25 CAPSULE ORAL at 12:02

## 2025-01-05 RX ADMIN — POTASSIUM CHLORIDE 40 MEQ: 1500 TABLET, EXTENDED RELEASE ORAL at 07:32

## 2025-01-05 NOTE — H&P
H&P - Hospitalist   Name: Kateryna Parks 44 y.o. female I MRN: [id number]  Unit/Bed#: ED-19 I Date of Admission: 1/5/2025   Date of Service: 1/5/2025 I Hospital Day: 0     Assessment & Plan  Postoperative hypothyroidism  Follows with endocrinology outpatient, was last seen 1/24.  Has a history of postoperative hypothyroidism since age 18 status post total thyroidectomy in 2015 due to large multinodular goiter with obstructive symptoms  At home is supposed to be taking 150 mcg 1 tablet daily Monday through Saturday and 2 tablets on Sunday.  Reports some missed doses, is not 100% compliant  Presenting with nausea, fatigue, hair loss, brittle skin with TSH elevated 107  Follow-up with free T4.  Does not appear to be in myxedema coma at this time  Will continue 200 mcg levothyroxine daily for now  Endocrinology consulted, appreciate recommendations  Edema  Presenting with a few weeks of worsening edema, on exam bilateral nonpitting edema in her legs and upper extremities likely due to hypothyroidism   No rales or elevated JVD on exam.  Does not have a prior echo.  Is not complaining of any shortness of breath orthopnea.  Less likely cardiac.  Will follow-up with BNP.  Holding off on any diuretic therapy  Regular diet with sodium 2 g restriction  Will order compression stockings  Hypocalcemia  Ionized calcium suboptimal on admission, 0.96.  Denying any paresthesias at this time  Will give 1 g calcium gluconate IV now and continue calcium carbonate tablets of 1000 mg twice daily tomorrow  Continue calcitriol 0.25 mcg twice daily  Hypoparathyroidism (HCC)  History of postoperative hypoparathyroidism  At home as prescribed calcitriol 0.25 mcg daily, calcium carbonate 1200 mg BID  Follow-up with PTH  Anemia  Hemoglobin 10.8 on admission, has been low in the past as well.  Most recent labs from 2022 showing hemoglobin 15.3.  She denies any GI bleeding or menorrhagia at this time  Follow-up with iron panel, if iron deficient  consider GI referral upon discharge for further evaluation  CKD (chronic kidney disease) stage 2, GFR 60-89 ml/min  Lab Results   Component Value Date    EGFR 45 01/05/2025    EGFR 73 09/23/2023    EGFR 67 08/01/2023    CREATININE 1.41 (H) 01/05/2025    CREATININE 0.95 09/23/2023    CREATININE 1.02 08/01/2023     Creatinine slightly elevated from baseline but does not meet criteria for RAFI currently with baseline 0.9-1  Follow-up with urinalysis.  Denies hematuria or frothy urine  Holding off on IV fluids at this time, encourage adequate p.o. intake and hydration  Continue to trend renal function  Daily weights, strict I's and O  Vitamin D deficiency  Takes 50,000 units vitamin D2 weekly on Fridays  Follow-up with vitamin D lab, if elevated consider switching to 1000 mg D3 daily  Hypokalemia  Potassium 3.3 on admission, received 40 mEq, continue to monitor and replenish as needed  Mag normal on admission, continue to optimize    VTE Pharmacologic Prophylaxis: VTE Score: 5 High Risk (Score >/= 5) - Pharmacological DVT Prophylaxis Ordered: enoxaparin (Lovenox). Sequential Compression Devices Ordered.  Code Status: Level 1 - Full Code   Discussion with family: Patient declined call to .     Anticipated Length of Stay: Patient will be admitted on an observation basis with an anticipated length of stay of less than 2 midnights secondary to new onset edema, post GI bug, metabolic abnormality correction.    History of Present Illness   Chief Complaint: Edema, fatigue    Kateryna Parks is a 44 y.o. female with a PMH of postoperative hypothyroidism status post total thyroidectomy, anemia, postoperative hypoparathyroidism who presents with new onset edema and a recent GI illness.  She was last seen by endocrinology in 1/2024 and at that time she restarted her thyroid medications as she previously stopped taking them in the past. The GI symptoms started 12/15 and consistent of diarrhea, nausea, and vomiting.  Edema in her hands, feet, and face started 2-3 weeks prior to onset of GI symptoms. She was unable to fit in certain shoes. All she could consume during GI illness was chicken noodle soup, gatorade, and liquid IV. For the past 2 days she has just been drinking water and 2 pieces of toast and continues to endorse swelling. 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 She also endorses fatigue, lightheadedness upon standing up, nausea. No sick contacts. Last seen by Tez 1/2024 and at the time she was prescribed synthroid 150mcg 1 tab daily M-S and Sunday 2 tabs, Calcitriol 0.25mcg daily, Calcium 1000mg tid. She notes that she has missed a few doses of her thyroid medication because she noted feeling worse when she takes it. She denies paraesthesias. She endorses more hair loss than usual in addition to dry skin. No chest pain or shortness of breath. No blood in her urine or stool. No abdominal cramping. LMP 12/15. Not sexually active.     In the ED, her blood pressure was 125/65, afebrile, pulse 70 to 80 bpm.  She is saturating the upper 90s on room air.  CBC notable for no leukocytosis, hemoglobin 10.8 which is comparable to prior values in the past.  CMP notable for hypokalemia, creatinine 1.41 which appears elevated from prior values slightly.  BUN 20.  Calcium ionized suboptimal, 0.96.  Phosphorus slightly elevated 4.9.  Notably, TSH elevated 107, free T4 pending.    Review of Systems   Constitutional:  Positive for appetite change, fatigue and unexpected weight change. Negative for fever.   HENT:  Positive for congestion. Negative for rhinorrhea.    Eyes:  Positive for visual disturbance. Negative for photophobia.        Blurry vision at times in both eyes    Respiratory:  Negative for cough and shortness of breath.    Cardiovascular:  Positive for leg swelling. Negative for chest pain and palpitations.   Gastrointestinal:  Positive for diarrhea and nausea. Negative for abdominal pain,  blood in stool, constipation and vomiting.   Genitourinary:  Negative for decreased urine volume, difficulty urinating and dysuria.   Musculoskeletal:  Negative for arthralgias and joint swelling.   Neurological:  Positive for weakness and light-headedness. Negative for numbness and headaches.   Psychiatric/Behavioral:  Negative for confusion.        Historical Information   Past Medical History:   Diagnosis Date    Anemia     H/O total thyroidectomy 10/2016    History of parathyroidectomy 10/2015    Hypothyroidism      Past Surgical History:   Procedure Laterality Date    PARATHYROIDECTOMY  10/2015    TOTAL THYROIDECTOMY  10/2015     Social History     Tobacco Use    Smoking status: Former     Current packs/day: 0.25     Types: Cigarettes    Smokeless tobacco: Never    Tobacco comments:     Quit in 2020   Vaping Use    Vaping status: Some Days    Substances: THC   Substance and Sexual Activity    Alcohol use: Never    Drug use: Not Currently     Types: Marijuana     Comment: occasionally    Sexual activity: Not Currently     Partners: Male     E-Cigarette/Vaping    E-Cigarette Use Current Some Day User     Comments 1 cartridge per month       E-Cigarette/Vaping Substances    Nicotine No     THC Yes     CBD No     Flavoring No     Other No     Unknown No        Social History:  Marital Status:    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  Prior to Admission medications    Medication Sig Start Date End Date Taking? Authorizing Provider   calcitriol (ROCALTROL) 0.25 mcg capsule Take 1 capsule (0.25 mcg total) by mouth 2 (two) times a day 9/13/23   Ivan Dsouza MD   calcium carbonate (OS-DENNIS) 600 MG tablet Take 2 tablets (1,200 mg total) by mouth 2 (two) times a day with meals  2/11/21   Kimberly Angel PA-C   ergocalciferol (VITAMIN D2) 50,000 units Take 1 capsule (50,000 Units total) by mouth once a week for 12 doses 8/13/24 10/30/24  Ivan Dsouza MD   Synthroid 150 MCG tablet Take 1 tablet daily Mon-Sat and 1.5 tablets on Sunday. Brand necessary 8/13/24   Ivan Dsouza MD   valACYclovir (VALTREX) 500 mg tablet Take 1 tablet (500 mg total) by mouth daily 10/31/23 1/29/24  LOVELY Veras     Allergies   Allergen Reactions    Latex      Other reaction(s): swollen sore sensation       Objective :  Temp:  [98 °F (36.7 °C)] 98 °F (36.7 °C)  HR:  [75-89] 79  BP: (107-125)/(58-68) 107/60  Resp:  [18] 18  SpO2:  [97 %] 97 %    Physical Exam  Vitals reviewed.   Constitutional:       Appearance: She is not diaphoretic.   HENT:      Head: Normocephalic and atraumatic.      Nose: No congestion or rhinorrhea.      Mouth/Throat:      Mouth: Mucous membranes are moist.   Eyes:      General: No visual field deficit.     Conjunctiva/sclera: Conjunctivae normal.      Pupils: Pupils are equal, round, and reactive to light.   Cardiovascular:      Rate and Rhythm: Normal rate and regular rhythm.   Pulmonary:      Effort: Pulmonary effort is normal. No respiratory distress.      Breath sounds: Normal breath sounds. No rales.   Abdominal:      Palpations: Abdomen is soft.      Tenderness: There is no abdominal tenderness. There is no guarding.   Musculoskeletal:         General: Swelling present.      Right lower leg: Edema present.      Left lower leg: Edema present.      Comments: Non pitting edema in bilateral LE and upper extremities. No JVD   Skin:     General: Skin is warm and dry.   Neurological:      General: No focal deficit present.      Mental Status: She is alert. Mental status is at baseline.      Cranial Nerves: Cranial nerves 2-12 are intact. No dysarthria.      Motor: No weakness or tremor.      Coordination: Coordination is intact.   Psychiatric:         Mood and Affect:  Mood normal.         Behavior: Behavior normal.         Thought Content: Thought content normal.                Lab Results: I have reviewed the following results:  Results from last 7 days   Lab Units 01/05/25  0656   WBC Thousand/uL 8.43   HEMOGLOBIN g/dL 10.8*   HEMATOCRIT % 32.9*   PLATELETS Thousands/uL 227   SEGS PCT % 59   LYMPHO PCT % 28   MONO PCT % 6   EOS PCT % 5     Results from last 7 days   Lab Units 01/05/25  0656   SODIUM mmol/L 137   POTASSIUM mmol/L 3.3*   CHLORIDE mmol/L 103   CO2 mmol/L 24   BUN mg/dL 20   CREATININE mg/dL 1.41*   ANION GAP mmol/L 10   CALCIUM mg/dL 7.9*   ALBUMIN g/dL 4.4   TOTAL BILIRUBIN mg/dL 0.54   ALK PHOS U/L 62   ALT U/L 27   AST U/L 42*   GLUCOSE RANDOM mg/dL 135             Lab Results   Component Value Date    HGBA1C 5.6 10/22/2022           Imaging Results Review: No pertinent imaging studies reviewed.  Other Study Results Review: EKG was reviewed.     Administrative Statements       ** Please Note: This note has been constructed using a voice recognition system. **

## 2025-01-05 NOTE — ASSESSMENT & PLAN NOTE
History of postoperative hypoparathyroidism  At home as prescribed calcitriol 0.25 mcg daily, calcium carbonate 1200 mg BID  Follow-up with PTH

## 2025-01-05 NOTE — ASSESSMENT & PLAN NOTE
Potassium 3.3 on admission, received 40 mEq, continue to monitor and replenish as needed  Mag normal on admission, continue to optimize

## 2025-01-05 NOTE — ASSESSMENT & PLAN NOTE
Lab Results   Component Value Date    EGFR 45 01/05/2025    EGFR 73 09/23/2023    EGFR 67 08/01/2023    CREATININE 1.41 (H) 01/05/2025    CREATININE 0.95 09/23/2023    CREATININE 1.02 08/01/2023     Creatinine slightly elevated from baseline but does not meet criteria for RAFI currently with baseline 0.9-1  Follow-up with urinalysis.  Denies hematuria or frothy urine  Holding off on IV fluids at this time, encourage adequate p.o. intake and hydration  Continue to trend renal function  Daily weights, strict I's and O

## 2025-01-05 NOTE — ASSESSMENT & PLAN NOTE
Takes 50,000 units vitamin D2 weekly on Fridays  Follow-up with vitamin D lab, if elevated consider switching to 1000 mg D3 daily

## 2025-01-05 NOTE — ASSESSMENT & PLAN NOTE
Hemoglobin 10.8 on admission, has been low in the past as well.  Most recent labs from 2022 showing hemoglobin 15.3.  She denies any GI bleeding or menorrhagia at this time  Follow-up with iron panel, if iron deficient consider GI referral upon discharge for further evaluation

## 2025-01-05 NOTE — ASSESSMENT & PLAN NOTE
Follows with endocrinology outpatient, was last seen 1/24.  Has a history of postoperative hypothyroidism since age 18 status post total thyroidectomy in 2015 due to large multinodular goiter with obstructive symptoms  At home is supposed to be taking 150 mcg 1 tablet daily Monday through Saturday and 2 tablets on Sunday.  Reports some missed doses, is not 100% compliant  Presenting with nausea, fatigue, hair loss, brittle skin with TSH elevated 107  Follow-up with free T4.  Does not appear to be in myxedema coma at this time  Will continue 200 mcg levothyroxine daily for now  Endocrinology consulted, appreciate recommendations

## 2025-01-05 NOTE — ASSESSMENT & PLAN NOTE
Presenting with a few weeks of worsening edema, on exam bilateral nonpitting edema in her legs and upper extremities likely due to hypothyroidism   No rales or elevated JVD on exam.  Does not have a prior echo.  Is not complaining of any shortness of breath orthopnea.  Less likely cardiac.  Will follow-up with BNP.  Holding off on any diuretic therapy  Regular diet with sodium 2 g restriction  Will order compression stockings

## 2025-01-05 NOTE — ED PROVIDER NOTES
Time reflects when diagnosis was documented in both MDM as applicable and the Disposition within this note       Time User Action Codes Description Comment    1/5/2025  8:15 AM Farzana Da Silva Add [E03.9] Hypothyroidism     1/5/2025  8:19 AM Leatha Stiles Modify [E03.9] Hypothyroidism     1/5/2025  8:19 AM Leatha Stiles Add [N17.9] RAFI (acute kidney injury) (HCC)     1/5/2025  8:22 AM Leatha Stiles Add [R60.9] Edema     1/5/2025 11:21 AM Marce Novak Add [E20.9] Hypoparathyroidism, unspecified hypoparathyroidism type (HCC)     1/5/2025 11:21 AM Marce Novak Add [Z98.890,  Z90.89] History of parathyroidectomy     1/6/2025  4:28 PM Sudheer Cooney Add [E20.89] Other hypoparathyroidism (HCC)     1/6/2025  5:17 PM Soledad Miles Add [E55.9] Vitamin D deficiency           ED Disposition       ED Disposition   Admit    Condition   Stable    Date/Time   Sun Jan 5, 2025  8:18 AM    Comment   Case was discussed with Dr. Selena Bishop and the patient's admission status was agreed to be Admission Status: observation status to the service of Dr. Selena Bishop .               Assessment & Plan       Medical Decision Making  44-year-old female presents with fatigue, edema.  Differential diagnosis includes but is not limited to acute anemia, acute renal failure, hypothyroidism, electrolyte abnormality, occasion noncompliance, depression    Problems Addressed:  RAFI (acute kidney injury) (HCC): acute illness or injury  Edema: acute illness or injury  Hypothyroidism: acute illness or injury    Amount and/or Complexity of Data Reviewed  External Data Reviewed: notes.     Details: Notes from prior endocrinology office visit reviewed by me  Labs: ordered.     Details: Labs ordered and independently interpreted by me, patient with mild RAFI, hypocalcemia, elevated TSH.  ECG/medicine tests: ordered and independent interpretation performed. Decision-making details documented in ED Course.  Discussion of management or test interpretation  "with external provider(s): Case discussed with internal medicine team who agrees with plan to admit for treatment of acute hypothyroidism and further workup of edema.    Risk  Prescription drug management.  Decision regarding hospitalization.             Medications   potassium chloride (Klor-Con M20) CR tablet 40 mEq (40 mEq Oral Given 1/5/25 4232)   calcium gluconate 1 g in sodium chloride 0.9% 50 mL (premix) (0 g Intravenous Stopped 1/5/25 1233)       ED Risk Strat Scores                                              History of Present Illness       Chief Complaint   Patient presents with    Medical Problem     Pt reports \"got stomach bug 2 weeks ago and having congestion and my face and legs and arms feels so tight\" denies cp/sob       Past Medical History:   Diagnosis Date    Anemia     H/O total thyroidectomy 10/2016    History of parathyroidectomy 10/2015    Hypothyroidism       Past Surgical History:   Procedure Laterality Date    PARATHYROIDECTOMY  10/2015    TOTAL THYROIDECTOMY  10/2015      Family History   Problem Relation Age of Onset    Hypothyroidism Mother     Lupus Mother     No Known Problems Father     Thyroid disease Sister     Thyroid disease Sister     Thyroid disease Sister     No Known Problems Sister     Hypothyroidism Maternal Grandmother     Diabetes type II Maternal Grandmother     Lupus Maternal Grandmother     Breast cancer Family     No Known Problems Maternal Aunt     No Known Problems Maternal Aunt     No Known Problems Paternal Aunt     Colon cancer Neg Hx     Ovarian cancer Neg Hx       Social History     Tobacco Use    Smoking status: Former     Current packs/day: 0.25     Types: Cigarettes    Smokeless tobacco: Never    Tobacco comments:     Quit in 2020   Vaping Use    Vaping status: Some Days    Substances: THC   Substance Use Topics    Alcohol use: Never    Drug use: Not Currently     Types: Marijuana     Comment: occasionally      E-Cigarette/Vaping    E-Cigarette Use Current " Some Day User     Comments 1 cartridge per month        E-Cigarette/Vaping Substances    Nicotine No     THC Yes     CBD No     Flavoring No     Other No     Unknown No       I have reviewed and agree with the history as documented.     44-year-old female presents to the emergency department for evaluation of generalized fatigue, intermittent lightheadedness and edema.  Patient states that the week of December 20 she developed a GI bug.  She had 4 days of illness with 2 days of nausea vomiting and diarrhea followed by 2 more days of diarrhea.  At that time she was drinking liquid IV and Gatorade.  She started to notice that she developed swelling in her hands and feet.  She reports that her skin of her arms and legs feels tight.  She has decreased electrolyte solution intake but has not noticed any improvement in the swelling.  She feels that her weight is elevated.  She is also having episodes of lightheadedness where she feels like she is going to faint and has to sit down quickly.  She has occasional palpitations.  No chest pain or shortness of breath.  She has not returned to a normal diet and has been mostly eating chicken noodle soup.  Patient does have a history of prior thyroidectomy and hypocalcemia due to removal of parathyroid glands.  She reports normal urinary output.  Does note some improvement in lower leg swelling after waking up in the morning but reports fluid reaccumulate's by the end of the day.  Patient states that friends have recognized a change in her appearance and her face looks swollen.  Patient does wake up with nausea in the morning.    Wt Readings from Last 3 Encounters:  01/05/25 : 125 kg (276 lb 3.8 oz)  08/26/24 : 120 kg (264 lb)  07/22/24 : 118 kg (261 lb)         History provided by:  Patient and medical records   used: No    Medical Problem  Location:  Generalized edema, fatigue  Severity:  Moderate  Onset quality:  Gradual  Duration:  2 weeks  Timing:   Constant  Progression:  Unchanged  Chronicity:  New  Associated symptoms: diarrhea, fatigue, nausea and vomiting    Associated symptoms: no abdominal pain, no chest pain, no congestion, no fever, no myalgias and no shortness of breath    Associated symptoms comment:  GI symptoms have largely resolved      Review of Systems   Constitutional:  Positive for appetite change and fatigue. Negative for fever.   HENT:  Negative for congestion.    Respiratory:  Negative for shortness of breath.    Cardiovascular:  Negative for chest pain.   Gastrointestinal:  Positive for diarrhea, nausea and vomiting. Negative for abdominal pain.   Genitourinary:  Negative for dysuria.   Musculoskeletal:  Negative for myalgias.   Neurological:  Positive for light-headedness. Negative for syncope and weakness.   All other systems reviewed and are negative.          Objective       ED Triage Vitals   Temperature Pulse Blood Pressure Respirations SpO2 Patient Position - Orthostatic VS   01/05/25 0617 01/05/25 0617 01/05/25 0617 01/05/25 0617 01/05/25 0617 01/05/25 0643   98 °F (36.7 °C) 89 125/65 18 97 % Lying - Orthostatic VS      Temp Source Heart Rate Source BP Location FiO2 (%) Pain Score    01/05/25 1803 01/05/25 0617 01/05/25 1803 -- 01/05/25 1809    Oral Monitor Right arm  No Pain      Vitals      Date and Time Temp Pulse SpO2 Resp BP Pain Score FACES Pain Rating User   01/06/25 0807 97.8 °F (36.6 °C) 79 96 % 15 132/84 -- -- LD   01/06/25 0715 -- -- -- -- -- No Pain -- TH   01/05/25 2212 -- -- -- -- -- No Pain -- IGR   01/05/25 2212 98.1 °F (36.7 °C) 64 95 % 18 96/54 -- -- CD   01/05/25 1809 -- -- -- -- -- No Pain -- DS   01/05/25 1803 98.3 °F (36.8 °C) 81 95 % -- 126/81 -- -- CZ   01/05/25 0649 -- 79 -- -- 107/60 -- -- ND   01/05/25 0646 -- 84 -- -- 116/63 -- -- ND   01/05/25 0645 -- 81 -- -- 117/58 -- -- ND   01/05/25 0643 -- 75 -- -- 115/68 -- -- ND   01/05/25 0617 98 °F (36.7 °C) 89 97 % 18 125/65 -- -- AD            Physical  Exam  Vitals reviewed.   Constitutional:       Appearance: She is well-developed. She is not ill-appearing, toxic-appearing or diaphoretic.   HENT:      Head: Normocephalic.      Right Ear: External ear normal.      Left Ear: External ear normal.      Nose: Nose normal.      Mouth/Throat:      Mouth: Mucous membranes are dry.      Pharynx: No oropharyngeal exudate.   Eyes:      General: No scleral icterus.     Conjunctiva/sclera: Conjunctivae normal.      Pupils: Pupils are equal, round, and reactive to light.   Cardiovascular:      Rate and Rhythm: Normal rate and regular rhythm.      Heart sounds: Normal heart sounds.   Pulmonary:      Effort: Pulmonary effort is normal.      Breath sounds: Normal breath sounds.   Abdominal:      General: Bowel sounds are normal. There is no distension.      Palpations: Abdomen is soft.      Tenderness: There is no abdominal tenderness. There is no guarding or rebound.   Musculoskeletal:         General: No tenderness or deformity. Normal range of motion.      Cervical back: Normal range of motion and neck supple.      Right lower leg: Edema present.      Left lower leg: Edema present.   Lymphadenopathy:      Cervical: No cervical adenopathy.   Skin:     General: Skin is warm and dry.      Findings: No rash.   Neurological:      Mental Status: She is alert and oriented to person, place, and time.      Cranial Nerves: No cranial nerve deficit.      Sensory: No sensory deficit.      Motor: No abnormal muscle tone.      Coordination: Coordination normal.      Gait: Gait normal.      Deep Tendon Reflexes: Reflexes are normal and symmetric.   Psychiatric:         Behavior: Behavior normal.         Thought Content: Thought content normal.         Judgment: Judgment normal.         Results Reviewed       Procedure Component Value Units Date/Time    Comprehensive metabolic panel [[id number]]  (Abnormal) Collected: 01/06/25 0452    Lab Status: Final result Specimen: Blood from Hand, Right  Updated: 01/06/25 0612     Sodium 138 mmol/L      Potassium 3.9 mmol/L      Chloride 102 mmol/L      CO2 26 mmol/L      ANION GAP 10 mmol/L      BUN 18 mg/dL      Creatinine 1.40 mg/dL      Glucose 96 mg/dL      Glucose, Fasting 96 mg/dL      Calcium 7.6 mg/dL      AST 38 U/L      ALT 23 U/L      Alkaline Phosphatase 58 U/L      Total Protein 7.4 g/dL      Albumin 4.4 g/dL      Total Bilirubin 0.63 mg/dL      eGFR 45 ml/min/1.73sq m     Narrative:      National Kid[employer] Foundation guidelines for Chronic Kidney Disease (CKD):     Stage 1 with normal or high GFR (GFR > 90 mL/min/1.73 square meters)    Stage 2 Mild CKD (GFR = 60-89 mL/min/1.73 square meters)    Stage 3A Moderate CKD (GFR = 45-59 mL/min/1.73 square meters)    Stage 3B Moderate CKD (GFR = 30-44 mL/min/1.73 square meters)    Stage 4 Severe CKD (GFR = 15-29 mL/min/1.73 square meters)    Stage 5 End Stage CKD (GFR <15 mL/min/1.73 square meters)  Note: GFR calculation is accurate only with a steady state creatinine    Magnesium [[id number]]  (Normal) Collected: 01/06/25 0452    Lab Status: Final result Specimen: Blood from Hand, Right Updated: 01/06/25 0612     Magnesium 2.1 mg/dL     Calcium, ionized [[id number]]  (Abnormal) Collected: 01/06/25 0452    Lab Status: Final result Specimen: Blood from Arm, Right Updated: 01/06/25 0600     Calcium, Ionized 0.93 mmol/L     CBC and differential [[id number]]  (Abnormal) Collected: 01/06/25 0452    Lab Status: Final result Specimen: Blood from Arm, Right Updated: 01/06/25 0532     WBC 8.00 Thousand/uL      RBC 4.06 Million/uL      Hemoglobin 11.3 g/dL      Hematocrit 36.1 %      MCV 89 fL      MCH 27.8 pg      MCHC 31.3 g/dL      RDW 16.5 %      MPV 10.5 fL      Platelets 224 Thousands/uL      nRBC 0 /100 WBCs      Segmented % 52 %      Immature Grans % 1 %      Lymphocytes % 34 %      Monocytes % 7 %      Eosinophils Relative 5 %      Basophils Relative 1 %      Absolute Neutrophils 4.14 Thousands/µL       "Absolute Immature Grans 0.06 Thousand/uL      Absolute Lymphocytes 2.74 Thousands/µL      Absolute Monocytes 0.55 Thousand/µL      Eosinophils Absolute 0.42 Thousand/µL      Basophils Absolute 0.09 Thousands/µL     Hemoglobin A1C w/ EAG Estimation [[id number]]  (Abnormal) Collected: 01/05/25 1235    Lab Status: Final result Specimen: Blood from Arm, Left Updated: 01/05/25 1531     Hemoglobin A1C 6.1 %       mg/dl     T4, free [[id number]]  (Abnormal) Collected: 01/05/25 0656    Lab Status: Final result Specimen: Blood from Arm, Left Updated: 01/05/25 1311     Free T4 <0.25 ng/dL     Narrative:        \"Therapeutic range for patients medicated with thyroid disorders: 0.61-1.24 ng/dL.\"    Insulin, fasting [[id number]]  (Abnormal) Collected: 01/05/25 0656    Lab Status: Final result Specimen: Blood from Arm, Left Updated: 01/05/25 1303     Insulin, Fasting 57.38 uIU/mL     Urine Microscopic [[id number]]  (Abnormal) Collected: 01/05/25 1244    Lab Status: Final result Specimen: Urine, Other Updated: 01/05/25 1300     RBC, UA 2-4 /hpf      WBC, UA 2-4 /hpf      Epithelial Cells Occasional /hpf      Bacteria, UA Occasional /hpf      MUCUS THREADS Moderate    Vitamin D 25 hydroxy [[id number]]  (Abnormal) Collected: 01/05/25 0656    Lab Status: Final result Specimen: Blood from Arm, Left Updated: 01/05/25 1300     Vit D, 25-Hydroxy 24.0 ng/mL     PTH, intact [[id number]]  (Abnormal) Collected: 01/05/25 0656    Lab Status: Final result Specimen: Blood from Arm, Left Updated: 01/05/25 1253     PTH 4.4 pg/mL     UA w Reflex to Microscopic w Reflex to Culture [[id number]]  (Abnormal) Collected: 01/05/25 1244    Lab Status: Final result Specimen: Urine, Other Updated: 01/05/25 1250     Color, UA Yellow     Clarity, UA Clear     Specific Gravity, UA 1.033     pH, UA 6.0     Leukocytes, UA Trace     Nitrite, UA Negative     Protein, UA Trace mg/dl      Glucose, UA Negative mg/dl      Ketones, UA Negative mg/dl      Urobilinogen, UA " <2.0 mg/dl      Bilirubin, UA Negative     Occult Blood, UA Negative    TIBC Panel (incl. Iron, TIBC, % Iron Saturation) [[id number]]  (Abnormal) Collected: 01/05/25 0656    Lab Status: Final result Specimen: Blood from Arm, Left Updated: 01/05/25 1240     Iron Saturation 11 %      TIBC 466.2 ug/dL      Iron 49 ug/dL      Transferrin 333 mg/dL      UIBC 417 ug/dL     B-Type Natriuretic Peptide(BNP) [[id number]]  (Normal) Collected: 01/05/25 0656    Lab Status: Final result Specimen: Blood from Arm, Left Updated: 01/05/25 1142     BNP 9 pg/mL     TSH, 3rd generation with Free T4 reflex [[id number]]  (Abnormal) Collected: 01/05/25 0656    Lab Status: Final result Specimen: Blood from Arm, Left Updated: 01/05/25 0807     TSH 3RD GENERATON 107.210 uIU/mL     HS Troponin 0hr (reflex protocol) [[id number]]  (Normal) Collected: 01/05/25 0656    Lab Status: Final result Specimen: Blood from Arm, Left Updated: 01/05/25 0727     hs TnI 0hr <2 ng/L     Phosphorus [[id number]]  (Abnormal) Collected: 01/05/25 0656    Lab Status: Final result Specimen: Blood from Arm, Left Updated: 01/05/25 0719     Phosphorus 4.9 mg/dL     Comprehensive metabolic panel [[id number]]  (Abnormal) Collected: 01/05/25 0656    Lab Status: Final result Specimen: Blood from Arm, Left Updated: 01/05/25 0719     Sodium 137 mmol/L      Potassium 3.3 mmol/L      Chloride 103 mmol/L      CO2 24 mmol/L      ANION GAP 10 mmol/L      BUN 20 mg/dL      Creatinine 1.41 mg/dL      Glucose 135 mg/dL      Calcium 7.9 mg/dL      AST 42 U/L      ALT 27 U/L      Alkaline Phosphatase 62 U/L      Total Protein 7.4 g/dL      Albumin 4.4 g/dL      Total Bilirubin 0.54 mg/dL      eGFR 45 ml/min/1.73sq m     Narrative:      National Kidney Disease Foundation guidelines for Chronic Kidney Disease (CKD):     Stage 1 with normal or high GFR (GFR > 90 mL/min/1.73 square meters)    Stage 2 Mild CKD (GFR = 60-89 mL/min/1.73 square meters)    Stage 3A Moderate CKD (GFR = 45-59  mL/min/1.73 square meters)    Stage 3B Moderate CKD (GFR = 30-44 mL/min/1.73 square meters)    Stage 4 Severe CKD (GFR = 15-29 mL/min/1.73 square meters)    Stage 5 End Stage CKD (GFR <15 mL/min/1.73 square meters)  Note: GFR calculation is accurate only with a steady state creatinine    Magnesium [[id number]]  (Normal) Collected: 01/05/25 0656    Lab Status: Final result Specimen: Blood from Arm, Left Updated: 01/05/25 0719     Magnesium 2.1 mg/dL     Calcium, ionized [[id number]]  (Abnormal) Collected: 01/05/25 0656    Lab Status: Final result Specimen: Blood from Arm, Left Updated: 01/05/25 0706     Calcium, Ionized 0.96 mmol/L     CBC and differential [[id number]]  (Abnormal) Collected: 01/05/25 0656    Lab Status: Final result Specimen: Blood from Arm, Left Updated: 01/05/25 0704     WBC 8.43 Thousand/uL      RBC 3.75 Million/uL      Hemoglobin 10.8 g/dL      Hematocrit 32.9 %      MCV 88 fL      MCH 28.8 pg      MCHC 32.8 g/dL      RDW 16.2 %      MPV 10.1 fL      Platelets 227 Thousands/uL      nRBC 0 /100 WBCs      Segmented % 59 %      Immature Grans % 1 %      Lymphocytes % 28 %      Monocytes % 6 %      Eosinophils Relative 5 %      Basophils Relative 1 %      Absolute Neutrophils 4.98 Thousands/µL      Absolute Immature Grans 0.09 Thousand/uL      Absolute Lymphocytes 2.37 Thousands/µL      Absolute Monocytes 0.53 Thousand/µL      Eosinophils Absolute 0.39 Thousand/µL      Basophils Absolute 0.07 Thousands/µL             No orders to display       ECG 12 Lead Documentation Only    Date/Time: 1/5/2025 10:15 AM    Performed by: Leatha Stiles DO  Authorized by: Leatha Stiles DO    Indications / Diagnosis:  Fatigue, palpitations  ECG reviewed by me, the ED Provider: yes    Patient location:  ED  Previous ECG:     Previous ECG:  Compared to current    Comparison ECG info:  2021  Interpretation:     Interpretation: non-specific    Quality:     Tracing quality:  Limited by artifact  Rate:     ECG rate:  98    ECG rate  assessment: normal    Rhythm:     Rhythm: sinus rhythm    Ectopy:     Ectopy: none    QRS:     QRS axis:  Normal  Conduction:     Conduction: normal    ST segments:     ST segments:  Normal  T waves:     T waves: normal        ED Medication and Procedure Management   Prior to Admission Medications   Prescriptions Last Dose Informant Patient Reported? Taking?   Synthroid 150 MCG tablet 1/5/2025  No Yes   Sig: Take 1 tablet daily Mon-Sat and 1.5 tablets on Sunday. Brand necessary   calcitriol (ROCALTROL) 0.25 mcg capsule 1/5/2025 Self No Yes   Sig: Take 1 capsule (0.25 mcg total) by mouth 2 (two) times a day   calcium carbonate (OS-DENNIS) 600 MG tablet Not Taking Self No No   Sig: Take 2 tablets (1,200 mg total) by mouth 2 (two) times a day with meals   Patient not taking: Reported on 1/5/2025   ergocalciferol (VITAMIN D2) 50,000 units   No Yes   Sig: Take 1 capsule (50,000 Units total) by mouth once a week for 12 doses      Facility-Administered Medications: None     Discharge Medication List as of 1/6/2025  4:59 PM        START taking these medications    Details   calcium carbonate (TUMS) 500 mg chewable tablet Chew 1 tablet (500 mg total) 3 (three) times a day with meals, Starting Tue 1/7/2025, Normal      Cholecalciferol (VITAMIN D3) 1,000 units tablet Take 2 tablets (2,000 Units total) by mouth daily Do not start before January 7, 2025., Starting Tue 1/7/2025, Normal           CONTINUE these medications which have CHANGED    Details   levothyroxine 200 mcg tablet Take 1 tablet (200 mcg total) by mouth daily in the early morning, Starting Tue 1/7/2025, Normal           CONTINUE these medications which have NOT CHANGED    Details   calcitriol (ROCALTROL) 0.25 mcg capsule Take 1 capsule (0.25 mcg total) by mouth 2 (two) times a day, Starting Wed 9/13/2023, Normal      ergocalciferol (VITAMIN D2) 50,000 units Take 1 capsule (50,000 Units total) by mouth once a week for 12 doses, Starting Tue 8/13/2024, Until Sun  1/5/2025, Normal      calcium carbonate (OS-DENNIS) 600 MG tablet Take 2 tablets (1,200 mg total) by mouth 2 (two) times a day with meals, Starting Thu 2/11/2021, No Print           No discharge procedures on file.  ED SEPSIS DOCUMENTATION   Time reflects when diagnosis was documented in both MDM as applicable and the Disposition within this note       Time User Action Codes Description Comment    1/5/2025  8:15 AM Farzana Da Silva Add [E03.9] Hypothyroidism     1/5/2025  8:19 AM Leatha Stiles Modify [E03.9] Hypothyroidism     1/5/2025  8:19 AM Leatha Stiles Add [N17.9] RAFI (acute kidney injury) (HCC)     1/5/2025  8:22 AM Leatha Stiles [R60.9] Edema     1/5/2025 11:21 AM Marce Novak Add [E20.9] Hypoparathyroidism, unspecified hypoparathyroidism type (HCC)     1/5/2025 11:21 AM Marce Novak Add [Z98.890,  Z90.89] History of parathyroidectomy     1/6/2025  4:28 PM Sudheer Cooney Add [E20.89] Other hypoparathyroidism (HCC)     1/6/2025  5:17 PM Soledad Miles Add [E55.9] Vitamin D deficiency                  Leatha Stiles DO  01/07/25 2236

## 2025-01-05 NOTE — ASSESSMENT & PLAN NOTE
Ionized calcium suboptimal on admission, 0.96.  Denying any paresthesias at this time  Will give 1 g calcium gluconate IV now and continue calcium carbonate tablets of 1000 mg twice daily tomorrow  Continue calcitriol 0.25 mcg twice daily

## 2025-01-06 VITALS
WEIGHT: 274.4 LBS | OXYGEN SATURATION: 96 % | DIASTOLIC BLOOD PRESSURE: 84 MMHG | RESPIRATION RATE: 15 BRPM | HEART RATE: 79 BPM | BODY MASS INDEX: 41.72 KG/M2 | SYSTOLIC BLOOD PRESSURE: 132 MMHG | TEMPERATURE: 97.8 F

## 2025-01-06 LAB
ALBUMIN SERPL BCG-MCNC: 4.4 G/DL (ref 3.5–5)
ALP SERPL-CCNC: 58 U/L (ref 34–104)
ALT SERPL W P-5'-P-CCNC: 23 U/L (ref 7–52)
ANION GAP SERPL CALCULATED.3IONS-SCNC: 10 MMOL/L (ref 4–13)
AST SERPL W P-5'-P-CCNC: 38 U/L (ref 13–39)
BASOPHILS # BLD AUTO: 0.09 THOUSANDS/ΜL (ref 0–0.1)
BASOPHILS NFR BLD AUTO: 1 % (ref 0–1)
BILIRUB SERPL-MCNC: 0.63 MG/DL (ref 0.2–1)
BUN SERPL-MCNC: 18 MG/DL (ref 5–25)
CA-I BLD-SCNC: 0.93 MMOL/L (ref 1.12–1.32)
CA-I BLD-SCNC: 1.03 MMOL/L (ref 1.12–1.32)
CALCIUM SERPL-MCNC: 7.6 MG/DL (ref 8.4–10.2)
CALCIUM SERPL-MCNC: 9.2 MG/DL (ref 8.4–10.2)
CHLORIDE SERPL-SCNC: 102 MMOL/L (ref 96–108)
CO2 SERPL-SCNC: 26 MMOL/L (ref 21–32)
CREAT SERPL-MCNC: 1.4 MG/DL (ref 0.6–1.3)
EOSINOPHIL # BLD AUTO: 0.42 THOUSAND/ΜL (ref 0–0.61)
EOSINOPHIL NFR BLD AUTO: 5 % (ref 0–6)
ERYTHROCYTE [DISTWIDTH] IN BLOOD BY AUTOMATED COUNT: 16.5 % (ref 11.6–15.1)
GFR SERPL CREATININE-BSD FRML MDRD: 45 ML/MIN/1.73SQ M
GLUCOSE P FAST SERPL-MCNC: 96 MG/DL (ref 65–99)
GLUCOSE SERPL-MCNC: 96 MG/DL (ref 65–140)
HCT VFR BLD AUTO: 36.1 % (ref 34.8–46.1)
HGB BLD-MCNC: 11.3 G/DL (ref 11.5–15.4)
IMM GRANULOCYTES # BLD AUTO: 0.06 THOUSAND/UL (ref 0–0.2)
IMM GRANULOCYTES NFR BLD AUTO: 1 % (ref 0–2)
LYMPHOCYTES # BLD AUTO: 2.74 THOUSANDS/ΜL (ref 0.6–4.47)
LYMPHOCYTES NFR BLD AUTO: 34 % (ref 14–44)
MAGNESIUM SERPL-MCNC: 2.1 MG/DL (ref 1.9–2.7)
MCH RBC QN AUTO: 27.8 PG (ref 26.8–34.3)
MCHC RBC AUTO-ENTMCNC: 31.3 G/DL (ref 31.4–37.4)
MCV RBC AUTO: 89 FL (ref 82–98)
MONOCYTES # BLD AUTO: 0.55 THOUSAND/ΜL (ref 0.17–1.22)
MONOCYTES NFR BLD AUTO: 7 % (ref 4–12)
NEUTROPHILS # BLD AUTO: 4.14 THOUSANDS/ΜL (ref 1.85–7.62)
NEUTS SEG NFR BLD AUTO: 52 % (ref 43–75)
NRBC BLD AUTO-RTO: 0 /100 WBCS
PLATELET # BLD AUTO: 224 THOUSANDS/UL (ref 149–390)
PMV BLD AUTO: 10.5 FL (ref 8.9–12.7)
POTASSIUM SERPL-SCNC: 3.9 MMOL/L (ref 3.5–5.3)
PROT SERPL-MCNC: 7.4 G/DL (ref 6.4–8.4)
RBC # BLD AUTO: 4.06 MILLION/UL (ref 3.81–5.12)
SODIUM SERPL-SCNC: 138 MMOL/L (ref 135–147)
WBC # BLD AUTO: 8 THOUSAND/UL (ref 4.31–10.16)

## 2025-01-06 PROCEDURE — 83735 ASSAY OF MAGNESIUM: CPT

## 2025-01-06 PROCEDURE — 85025 COMPLETE CBC W/AUTO DIFF WBC: CPT

## 2025-01-06 PROCEDURE — 99245 OFF/OP CONSLTJ NEW/EST HI 55: CPT | Performed by: INTERNAL MEDICINE

## 2025-01-06 PROCEDURE — 82330 ASSAY OF CALCIUM: CPT

## 2025-01-06 PROCEDURE — 82330 ASSAY OF CALCIUM: CPT | Performed by: STUDENT IN AN ORGANIZED HEALTH CARE EDUCATION/TRAINING PROGRAM

## 2025-01-06 PROCEDURE — 99232 SBSQ HOSP IP/OBS MODERATE 35: CPT | Performed by: INTERNAL MEDICINE

## 2025-01-06 PROCEDURE — 80053 COMPREHEN METABOLIC PANEL: CPT

## 2025-01-06 RX ORDER — CALCIUM GLUCONATE 20 MG/ML
2 INJECTION, SOLUTION INTRAVENOUS ONCE
Status: COMPLETED | OUTPATIENT
Start: 2025-01-06 | End: 2025-01-06

## 2025-01-06 RX ORDER — ERGOCALCIFEROL 1.25 MG/1
50000 CAPSULE, LIQUID FILLED ORAL WEEKLY
Qty: 12 CAPSULE | Refills: 0 | Status: SHIPPED | OUTPATIENT
Start: 2025-01-06 | End: 2025-03-25

## 2025-01-06 RX ORDER — CALCIUM GLUCONATE 20 MG/ML
1 INJECTION, SOLUTION INTRAVENOUS ONCE
Status: COMPLETED | OUTPATIENT
Start: 2025-01-06 | End: 2025-01-06

## 2025-01-06 RX ORDER — LEVOTHYROXINE SODIUM 200 UG/1
200 TABLET ORAL
Qty: 30 TABLET | Refills: 0 | Status: SHIPPED | OUTPATIENT
Start: 2025-01-07

## 2025-01-06 RX ORDER — CALCIUM CARBONATE 500 MG/1
500 TABLET, CHEWABLE ORAL
Qty: 90 TABLET | Refills: 0 | Status: SHIPPED | OUTPATIENT
Start: 2025-01-07

## 2025-01-06 RX ORDER — CALCIUM CARBONATE 500 MG/1
500 TABLET, CHEWABLE ORAL
Status: DISCONTINUED | OUTPATIENT
Start: 2025-01-06 | End: 2025-01-06 | Stop reason: HOSPADM

## 2025-01-06 RX ADMIN — CALCIUM GLUCONATE 1 G: 20 INJECTION, SOLUTION INTRAVENOUS at 09:07

## 2025-01-06 RX ADMIN — Medication 1000 UNITS: at 09:05

## 2025-01-06 RX ADMIN — CALCIUM CARBONATE 1000 MG: 500 TABLET, CHEWABLE ORAL at 11:07

## 2025-01-06 RX ADMIN — ENOXAPARIN SODIUM 40 MG: 40 INJECTION SUBCUTANEOUS at 09:06

## 2025-01-06 RX ADMIN — CALCITRIOL CAPSULES 0.25 MCG 0.25 MCG: 0.25 CAPSULE ORAL at 10:44

## 2025-01-06 RX ADMIN — LEVOTHYROXINE SODIUM 200 MCG: 0.1 TABLET ORAL at 05:34

## 2025-01-06 RX ADMIN — CALCIUM CARBONATE 500 MG: 500 TABLET, CHEWABLE ORAL at 16:24

## 2025-01-06 RX ADMIN — CALCIUM GLUCONATE 2 G: 20 INJECTION, SOLUTION INTRAVENOUS at 09:06

## 2025-01-06 NOTE — PROGRESS NOTES
Progress Note - Hospitalist   Name: Kateryna Parks 44 y.o. female I MRN: [id number]  Unit/Bed#: -01 I Date of Admission: 1/5/2025   Date of Service: 1/6/2025 I Hospital Day: 0    Assessment & Plan  Postoperative hypothyroidism  Follows with endocrinology outpatient, was last seen 1/24.  Has a history of postoperative hypothyroidism since age 18 status post total thyroidectomy in 2015 due to large multinodular goiter with obstructive symptoms  At home is supposed to be taking 150 mcg 1 tablet daily Monday through Saturday and 2 tablets on Sunday.  Reports some missed doses, is not 100% compliant  Presenting with nausea, fatigue, hair loss, brittle skin with TSH elevated 107  T4 decreased <0.25  Does not appear to be in myxedema coma at this time    Will continue 200 mcg levothyroxine daily for now  Endocrinology consulted, appreciate recommendations  Check ionized calcium and serum calcium  Edema  Presenting with a few weeks of worsening edema, on exam bilateral nonpitting edema in her legs and upper extremities likely due to hypothyroidism   No rales or elevated JVD on exam.  Does not have a prior echo.  Is not complaining of any shortness of breath orthopnea.  Less likely cardiac.  Will follow-up with BNP.  Holding off on any diuretic therapy  Regular diet with sodium 2 g restriction  Will order compression stockings  Hypocalcemia  Ionized calcium suboptimal on admission, 0.96.  Denying any paresthesias at this time  Will give 1 g calcium gluconate IV now and continue calcium carbonate tablets of 1000 mg twice daily tomorrow  Continue calcitriol 0.25 mcg twice daily  Hypoparathyroidism (HCC)  History of postoperative hypoparathyroidism  At home as prescribed calcitriol 0.25 mcg daily, calcium carbonate 1200 mg BID  PTH level at 4.4  Anemia  Hemoglobin 10.8 on admission, has been low in the past as well.  Most recent labs from 2022 showing hemoglobin 15.3.  She denies any GI bleeding or menorrhagia at this  time  Iron panel indicating iron deficiency anemia    consider GI referral upon discharge for further evaluation  CKD (chronic kidney disease) stage 2, GFR 60-89 ml/min  Lab Results   Component Value Date    EGFR 45 01/06/2025    EGFR 45 01/05/2025    EGFR 73 09/23/2023    CREATININE 1.40 (H) 01/06/2025    CREATININE 1.41 (H) 01/05/2025    CREATININE 0.95 09/23/2023     Creatinine slightly elevated from baseline but does not meet criteria for RAFI currently with baseline 0.9-1  Follow-up with urinalysis.  Denies hematuria or frothy urine  Holding off on IV fluids at this time, encourage adequate p.o. intake and hydration  Continue to trend renal function  Daily weights, strict I's and O  Vitamin D deficiency  Takes 50,000 units vitamin D2 weekly on Fridays  Follow-up with vitamin D lab, if elevated consider switching to 1000 mg D3 daily  Hypokalemia  Potassium 3.3 on admission, received 40 mEq, continue to monitor and replenish as needed  Mag normal on admission, continue to optimize    VTE Pharmacologic Prophylaxis: VTE Score: 5 High Risk (Score >/= 5) - Pharmacological DVT Prophylaxis Ordered: enoxaparin (Lovenox). Sequential Compression Devices Ordered.    Mobility:   Basic Mobility Inpatient Raw Score: 24  JH-HLM Goal: 8: Walk 250 feet or more  JH-HLM Achieved: 8: Walk 250 feet ot more  JH-HLM Goal achieved. Continue to encourage appropriate mobility.    Patient Centered Rounds: I performed bedside rounds with nursing staff today.   Discussions with Specialists or Other Care Team Provider: Endocrinology    Education and Discussions with Family / Patient: Patient declined call to .     Current Length of Stay: 0 day(s)  Current Patient Status: Observation   Certification Statement: The patient, admitted on an observation basis, will now require > 2 midnight hospital stay due to hypothyroidism  Discharge Plan: Anticipate discharge tomorrow to home.    Code Status: Level 1 - Full Code    Subjective   No  acute events overnight.  Patient examined at bedside this morning.  Patient states that she is feeling in her normal state of health besides minor fatigue.  Patient states that she would like to go home and continue with her levothyroxine and endocrinology follow-up outpatient.  All questions answered.    Objective :  Temp:  [97.8 °F (36.6 °C)-98.3 °F (36.8 °C)] 97.8 °F (36.6 °C)  HR:  [64-81] 79  BP: ()/(54-84) 132/84  Resp:  [15-18] 15  SpO2:  [95 %-96 %] 96 %    Body mass index is 41.72 kg/m².     Input and Output Summary (last 24 hours):     Intake/Output Summary (Last 24 hours) at 1/6/2025 1526  Last data filed at 1/6/2025 0958  Gross per 24 hour   Intake 480 ml   Output --   Net 480 ml       Physical Exam  Vitals reviewed.   Constitutional:       Appearance: She is not diaphoretic.   HENT:      Head: Normocephalic and atraumatic.      Nose: No congestion or rhinorrhea.      Mouth/Throat:      Mouth: Mucous membranes are moist.   Eyes:      General: No visual field deficit.     Conjunctiva/sclera: Conjunctivae normal.      Pupils: Pupils are equal, round, and reactive to light.   Cardiovascular:      Rate and Rhythm: Normal rate and regular rhythm.   Pulmonary:      Effort: Pulmonary effort is normal. No respiratory distress.      Breath sounds: Normal breath sounds. No rales.   Abdominal:      Palpations: Abdomen is soft.      Tenderness: There is no abdominal tenderness. There is no guarding.   Musculoskeletal:         General: Swelling present.      Right lower leg: Edema present.      Left lower leg: Edema present.      Comments: Non pitting edema in bilateral LE and upper extremities. No JVD   Skin:     General: Skin is warm and dry.   Neurological:      General: No focal deficit present.      Mental Status: She is alert. Mental status is at baseline.      Cranial Nerves: Cranial nerves 2-12 are intact. No dysarthria.      Motor: No weakness or tremor.      Coordination: Coordination is intact.    Psychiatric:         Mood and Affect: Mood normal.         Behavior: Behavior normal.         Thought Content: Thought content normal.       Lines/Drains:      Lab Results: I have reviewed the following results:   Results from last 7 days   Lab Units 01/06/25  0452   WBC Thousand/uL 8.00   HEMOGLOBIN g/dL 11.3*   HEMATOCRIT % 36.1   PLATELETS Thousands/uL 224   SEGS PCT % 52   LYMPHO PCT % 34   MONO PCT % 7   EOS PCT % 5     Results from last 7 days   Lab Units 01/06/25  0452   SODIUM mmol/L 138   POTASSIUM mmol/L 3.9   CHLORIDE mmol/L 102   CO2 mmol/L 26   BUN mg/dL 18   CREATININE mg/dL 1.40*   ANION GAP mmol/L 10   CALCIUM mg/dL 7.6*   ALBUMIN g/dL 4.4   TOTAL BILIRUBIN mg/dL 0.63   ALK PHOS U/L 58   ALT U/L 23   AST U/L 38   GLUCOSE RANDOM mg/dL 96             Results from last 7 days   Lab Units 01/05/25  1235   HEMOGLOBIN A1C % 6.1*           Recent Cultures (last 7 days):         Imaging Results Review: No pertinent imaging studies reviewed.  Other Study Results Review: EKG was reviewed.     Last 24 Hours Medication List:     Current Facility-Administered Medications:     benzonatate (TESSALON PERLES) capsule 100 mg, TID PRN    calcitriol (ROCALTROL) capsule 0.25 mcg, BID    calcium carbonate (TUMS) chewable tablet 1,000 mg, BID before lunch/dinner    Cholecalciferol (VITAMIN D3) tablet 1,000 Units, Daily    enoxaparin (LOVENOX) subcutaneous injection 40 mg, Q12H CANDI    levothyroxine tablet 200 mcg, Early Morning    Administrative Statements   Today, Patient Was Seen By: Sudheer Cooney MD  I have spent a total time of 30 minutes in caring for this patient on the day of the visit/encounter including Diagnostic results, Prognosis, Risks and benefits of tx options, Instructions for management, Patient and family education, Importance of tx compliance, Risk factor reductions, Impressions, Counseling / Coordination of care, Documenting in the medical record, Reviewing / ordering tests, medicine, procedures  ,  Obtaining or reviewing history  , and Communicating with other healthcare professionals .    **Please Note: This note may have been constructed using a voice recognition system.**

## 2025-01-06 NOTE — DISCHARGE SUMMARY
Discharge Summary - Hospitalist   Name: Kateryna Parks 44 y.o. female I MRN: [id number]  Unit/Bed#: -01 I Date of Admission: 1/5/2025   Date of Service: 1/6/2025 I Hospital Day: 0     Assessment & Plan  Postoperative hypothyroidism  Follows with endocrinology outpatient, was last seen 1/24.  Has a history of postoperative hypothyroidism since age 18 status post total thyroidectomy in 2015 due to large multinodular goiter with obstructive symptoms  At home is supposed to be taking 150 mcg 1 tablet daily Monday through Saturday and 2 tablets on Sunday.  Reports some missed doses, is not 100% compliant  Presenting with nausea, fatigue, hair loss, brittle skin with TSH elevated 107  T4 decreased <0.25  Does not appear to be in myxedema coma at this time    Will continue 200 mcg levothyroxine daily for now  Endocrinology consulted, appreciate recommendations  Check ionized calcium and serum calcium  Edema  Presenting with a few weeks of worsening edema, on exam bilateral nonpitting edema in her legs and upper extremities likely due to hypothyroidism   No rales or elevated JVD on exam.  Does not have a prior echo.  Is not complaining of any shortness of breath orthopnea.  Less likely cardiac.  Will follow-up with BNP.  Holding off on any diuretic therapy  Regular diet with sodium 2 g restriction  Will order compression stockings  Hypocalcemia  Ionized calcium suboptimal on admission, 0.96.  Denying any paresthesias at this time  Will give 1 g calcium gluconate IV now and continue calcium carbonate tablets of 1000 mg twice daily tomorrow  Continue calcitriol 0.25 mcg twice daily  Hypoparathyroidism (HCC)  History of postoperative hypoparathyroidism  At home as prescribed calcitriol 0.25 mcg daily, calcium carbonate 1200 mg BID  PTH level at 4.4  Anemia  Hemoglobin 10.8 on admission, has been low in the past as well.  Most recent labs from 2022 showing hemoglobin 15.3.  She denies any GI bleeding or menorrhagia at  this time  Iron panel indicating iron deficiency anemia    consider GI referral upon discharge for further evaluation  CKD (chronic kidney disease) stage 2, GFR 60-89 ml/min  Lab Results   Component Value Date    EGFR 45 01/06/2025    EGFR 45 01/05/2025    EGFR 73 09/23/2023    CREATININE 1.40 (H) 01/06/2025    CREATININE 1.41 (H) 01/05/2025    CREATININE 0.95 09/23/2023     Creatinine slightly elevated from baseline but does not meet criteria for RAFI currently with baseline 0.9-1  Follow-up with urinalysis.  Denies hematuria or frothy urine  Holding off on IV fluids at this time, encourage adequate p.o. intake and hydration  Continue to trend renal function  Daily weights, strict I's and O  Vitamin D deficiency  Takes 50,000 units vitamin D2 weekly on Fridays  Follow-up with vitamin D lab, if elevated consider switching to 1000 mg D3 daily  Hypokalemia  Potassium 3.3 on admission, received 40 mEq, continue to monitor and replenish as needed  Mag normal on admission, continue to optimize     Medical Problems       Resolved Problems  Date Reviewed: 1/22/2024   None       Discharging Physician / Practitioner: Sudheer Cooney MD  PCP: LOVELY Rodrigez  Admission Date:   Admission Orders (From admission, onward)       Ordered        01/05/25 0825  Place in Observation  Once                          Discharge Date: 01/06/25    Consultations During Hospital Stay:  Endocrinology    Procedures Performed:   none    Significant Findings / Test Results:   High TSH and low T4    Incidental Findings:   none    Test Results Pending at Discharge (will require follow up):   none     Outpatient Tests Requested:  TSH in 4 weeks    Complications:  none    Reason for Admission: hypothyroidism    Hospital Course:   Kateryna Parks is a 44 y.o. female patient who originally presented to the hospital on 1/5/2025 due to hypothyroidism. Lab tests confirmed low thyroid hormone levels. Patient has been on synthroid and endorses  occasional missed doses. Likely hypothyroidism due to noncompliance. Patient had levothyroxine prescribed at 200 mcg and discharged with vitamin D2 and D3 as well as calcium supplementation. Discharged in stable condition to home with recs to follow up with endocrinology.      Please see above list of diagnoses and related plan for additional information.     Condition at Discharge: stable    Discharge Day Visit / Exam:   Subjective:  No acute events overnight. Patient examined at bedside this morning. Patient states that she is feeling in her normal state of health besides minor fatigue. Patient states that she would like to go home and continue with her levothyroxine and endocrinology follow-up outpatient. All questions answered.   Vitals: Blood Pressure: 132/84 (01/06/25 0807)  Pulse: 79 (01/06/25 0807)  Temperature: 97.8 °F (36.6 °C) (01/06/25 0807)  Temp Source: Oral (01/06/25 0807)  Respirations: 15 (01/06/25 0807)  Weight - Scale: 124 kg (274 lb 6.4 oz) (01/06/25 0540)  SpO2: 96 % (01/06/25 0807)  Physical Exam  Vitals reviewed.   Constitutional:       Appearance: She is not diaphoretic.   HENT:      Head: Normocephalic and atraumatic.      Nose: No congestion or rhinorrhea.      Mouth/Throat:      Mouth: Mucous membranes are moist.   Eyes:      General: No visual field deficit.     Conjunctiva/sclera: Conjunctivae normal.      Pupils: Pupils are equal, round, and reactive to light.   Cardiovascular:      Rate and Rhythm: Normal rate and regular rhythm.   Pulmonary:      Effort: Pulmonary effort is normal. No respiratory distress.      Breath sounds: Normal breath sounds. No rales.   Abdominal:      Palpations: Abdomen is soft.      Tenderness: There is no abdominal tenderness. There is no guarding.   Musculoskeletal:         General: Swelling present.      Right lower leg: Edema present.      Left lower leg: Edema present.      Comments: Non pitting edema in bilateral LE and upper extremities. No JVD    Skin:     General: Skin is warm and dry.   Neurological:      General: No focal deficit present.      Mental Status: She is alert. Mental status is at baseline.      Cranial Nerves: Cranial nerves 2-12 are intact. No dysarthria.      Motor: No weakness or tremor.      Coordination: Coordination is intact.   Psychiatric:         Mood and Affect: Mood normal.         Behavior: Behavior normal.         Thought Content: Thought content normal.     Discussion with Family: Patient declined call to .     Discharge instructions/Information to patient and family:   See after visit summary for information provided to patient and family.      Provisions for Follow-Up Care:  See after visit summary for information related to follow-up care and any pertinent home health orders.      Mobility at time of Discharge:   Basic Mobility Inpatient Raw Score: 24  JH-HLM Goal: 8: Walk 250 feet or more  JH-HLM Achieved: 8: Walk 250 feet ot more  HLM Goal achieved. Continue to encourage appropriate mobility.     Disposition:   Home    Planned Readmission: none    Discharge Medications:  See after visit summary for reconciled discharge medications provided to patient and/or family.      Administrative Statements   Discharge Statement:  I have spent a total time of 30 minutes in caring for this patient on the day of the visit/encounter. >30 minutes of time was spent on: Diagnostic results, Prognosis, Risks and benefits of tx options, Instructions for management, Patient and family education, Importance of tx compliance, Risk factor reductions, Impressions, Counseling / Coordination of care, Documenting in the medical record, Reviewing / ordering tests, medicine, procedures  , and Communicating with other healthcare professionals .    **Please Note: This note may have been constructed using a voice recognition system**

## 2025-01-06 NOTE — PLAN OF CARE
Problem: PAIN - ADULT  Goal: Verbalizes/displays adequate comfort level or baseline comfort level  Description: Interventions:  - Encourage patient to monitor pain and request assistance  - Assess pain using appropriate pain scale  - Administer analgesics based on type and severity of pain and evaluate response  - Implement non-pharmacological measures as appropriate and evaluate response  - Consider cultural and social influences on pain and pain management  - Notify physician/advanced practitioner if interventions unsuccessful or patient reports new pain  Outcome: Progressing     Problem: INFECTION - ADULT  Goal: Absence or prevention of progression during hospitalization  Description: INTERVENTIONS:  - Assess and monitor for signs and symptoms of infection  - Monitor lab/diagnostic results  - Monitor all insertion sites, i.e. indwelling lines, tubes, and drains  - Monitor endotracheal if appropriate and nasal secretions for changes in amount and color  - [place] appropriate cooling/warming therapies per order  - Administer medications as ordered  - Instruct and encourage patient and family to use good hand hygiene technique  - Identify and instruct in appropriate isolation precautions for identified infection/condition  Outcome: Progressing  Goal: Absence of fever/infection during neutropenic period  Description: INTERVENTIONS:  - Monitor WBC    Outcome: Progressing     Problem: SAFETY ADULT  Goal: Patient will remain free of falls  Description: INTERVENTIONS:  - Educate patient/family on patient safety including physical limitations  - Instruct patient to call for assistance with activity   - Consult OT/PT to assist with strengthening/mobility   - Keep Call bell within reach  - Keep bed low and locked with side rails adjusted as appropriate  - Keep care items and personal belongings within reach  - Initiate and maintain comfort rounds  - Make Fall Risk Sign visible to staff  - Offer Toileting every  Hours,  in advance of need  - Initiate/Maintain alarm  - Obtain necessary fall risk management equipment:   - Apply yellow socks and bracelet for high fall risk patients  - Consider moving patient to room near nurses station  Outcome: Progressing  Goal: Maintain or return to baseline ADL function  Description: INTERVENTIONS:  -  Assess patient's ability to carry out ADLs; assess patient's baseline for ADL function and identify physical deficits which impact ability to perform ADLs (bathing, care of mouth/teeth, toileting, grooming, dressing, etc.)  - Assess/evaluate cause of self-care deficits   - Assess range of motion  - Assess patient's mobility; develop plan if impaired  - Assess patient's need for assistive devices and provide as appropriate  - Encourage maximum independence but intervene and supervise when necessary  - Involve family in performance of ADLs  - Assess for home care needs following discharge   - Consider OT consult to assist with ADL evaluation and planning for discharge  - Provide patient education as appropriate  Outcome: Progressing  Goal: Maintains/Returns to pre admission functional level  Description: INTERVENTIONS:  - Perform AM-PAC 6 Click Basic Mobility/ Daily Activity assessment daily.  - Set and communicate daily mobility goal to care team and patient/family/caregiver.   - Collaborate with rehabilitation services on mobility goals if consulted  - Perform Range of Motion  times a day.  - Reposition patient every  hours.  - Dangle patient  times a day  - Stand patient  times a day  - Ambulate patient  times a day  - Out of bed to chair  times a day   - Out of bed for meals times a day  - Out of bed for toileting  - Record patient progress and toleration of activity level   Outcome: Progressing     Problem: DISCHARGE PLANNING  Goal: Discharge to home or other facility with appropriate resources  Description: INTERVENTIONS:  - Identify barriers to discharge w/patient and caregiver  - Arrange for  needed discharge resources and transportation as appropriate  - Identify discharge learning needs (meds, wound care, etc.)  - Arrange for interpretive services to assist at discharge as needed  - Refer to Case Management Department for coordinating discharge planning if the patient needs post-hospital services based on physician/advanced practitioner order or complex needs related to functional status, cognitive ability, or social support system  Outcome: Progressing     Problem: Knowledge Deficit  Goal: Patient/family/caregiver demonstrates understanding of disease process, treatment plan, medications, and discharge instructions  Description: Complete learning assessment and assess knowledge base.  Interventions:  - Provide teaching at level of understanding  - Provide teaching via preferred learning methods  Outcome: Progressing

## 2025-01-06 NOTE — ASSESSMENT & PLAN NOTE
Lab Results   Component Value Date    EGFR 45 01/06/2025    EGFR 45 01/05/2025    EGFR 73 09/23/2023    CREATININE 1.40 (H) 01/06/2025    CREATININE 1.41 (H) 01/05/2025    CREATININE 0.95 09/23/2023     Creatinine slightly elevated from baseline but does not meet criteria for RAFI currently with baseline 0.9-1  Follow-up with urinalysis.  Denies hematuria or frothy urine  Holding off on IV fluids at this time, encourage adequate p.o. intake and hydration  Continue to trend renal function  Daily weights, strict I's and O

## 2025-01-06 NOTE — CONSULTS
Consultation - Hospitalist   Name: Kateryna Parks 44 y.o. female I MRN: [id number]  Unit/Bed#: -01 I Date of Admission: 1/5/2025   Date of Service: 1/6/2025 I Hospital Day: 0       Inpatient consult to Endocrinology     Date/Time  1/6/2025 2:02 PM     Performed by  Janay Sandra MD   Authorized by  Marce Novak MD           Physician Requesting Evaluation: Asif Daniel MD   Reason for Evaluation / Principal Problem: postoperative hypothyroidism      Kateryna Parks is a 44 y.o. female presents for evaluation of hypothyroidism   ASSESSMENT/PLAN:              1. Hypothyroidism  Admitted on 1/5/24 with nausea, significant fatigue, hair loss, cramps in the lower extremity. Water retention  , Free T4 0.25  Admits that she is not always compliant with levothyroxine, pt didn't take levothyroxine for 6 months due to socioeconomic issues. Pt recommended to take synthroid and wasn't able to afford it.  s/p total thyroidectomy in 2015 due to large multinodular goiter with obstructive symptoms  Pt is following with Dr. Dsouza, last visit was in Jan 2024.  Home regimen Levothyroxine 150 mcg daily except Sun when suppose to take 300 mcg  Plan:  Switch to Levothyroxine 200 mcg daily   Encourage close follow up with endocrinology on dc      2. Hypoparathyroidism  3/4 parathyroid glands were removed during thyroidectomy  Home regimen: Calcitriol 0.25, Calcium 1000 mg, vit D 40935 weekly  On admission PTH intact 4.4  Pt admits that she wasn't compliant with Vit D however was taking Ca carbonate and Tums  Plan:  Take Calcitriol 0.25 mg BID  Take Vit D3 - 2000 Daily   Vit D 2 - 50 000 weekly    3. Hypocalcemia  Chronic hypocalcemia in settings of post surgical hypoparathyroidism  On presentation C 6.8, today corrected calcium 7.3  S/p 1 g of Ca IV on 1/5. And 3 g of ca on 1/6  Plan:  Take Calcium 500 TID   Take vit D as recommended above    Vit D deficiency  Vit D level 24.0  Ca Ion 0.93  See plan as  above    Anemia, AMY  Consider iron supplement  Follow with pcp  Sugar  Hypothyroidism management, see plan as above    Insulin fasting 57.3  Follow with endocrinology in outpatient settings    Plan Summary:  Switch to Levothyroxine 200 mcg daily   Take Calcitriol 0.25 mg BID  Take Vit D3 2000 Daily   Vit D 2 50 000 weekly  Take calcium carbonate 500 TID  Close follow up with endocrinology in o/p settings as well as medications compliance encouraged  Pt is cleared for discharge from endocrinology stand point    H&P:  A 45 yo f with PMH of postoperative hypothyroidism since age 18, s/p total thyroidectomy in 2015 due to large multinodular goiter with obstructive symptoms, post op hypoparathyroidism, obesity, Vit D deficiency who presented with fatigue, fluid retention, weight gain and cramping in her legs. Pt reports that her normal weight 240, during the last several years weight increased to 274. Pt informs that she wasn't taking vit D regularly as well however she was taking calcium as otherwise she is having cramps and muscle spasms all the time. Pt was taking Synthroid which she couldn't afford all the time. Pt was admitted to the hospital on 1/5/24 due to progressive fatigue, water retention due to non-compliance with medications for the last 6 months secondary to cost/insurance issues.      Associated signs/symptoms:     No  weight loss/Yes weight gain  Yes  Change in appetite. Feeling hungry  No heat intolerance/Yes  cold intolerance  No  diarrhea/No constipation  No sweating/No  tremor/No palpitation  No  difficulty sleeping  Yes hair loss/ Yes ry skin/ No  brittle nails  YES fatigue  Mood changes: yes     Changes in menstrual cycles:     Swelling in the neck: No   Signs/symptoms due to thyroid gland enlargement:  Obstructive symptoms: no trouble swallowing,hoarseness or with voice , trouble breathing     Modifying factors: Risk factors for thyroid disease:  Lithium No   Interferon  No   Amiodarone  No  Iodine exposure {No      Family history of thyroid disease: Yes      All other systems were reviewed and are negative.     Past medical/surgical history  Past Medical History:   Diagnosis Date    Anemia     H/O total thyroidectomy 10/2016    History of parathyroidectomy 10/2015    Hypothyroidism         Past Surgical History:   Procedure Laterality Date    PARATHYROIDECTOMY  10/2015    TOTAL THYROIDECTOMY  10/2015        Family History:  Family History   Problem Relation Age of Onset    Hypothyroidism Mother     Lupus Mother     No Known Problems Father     Thyroid disease Sister     Thyroid disease Sister     Thyroid disease Sister     No Known Problems Sister     Hypothyroidism Maternal Grandmother     Diabetes type II Maternal Grandmother     Lupus Maternal Grandmother     Breast cancer Family     No Known Problems Maternal Aunt     No Known Problems Maternal Aunt     No Known Problems Paternal Aunt     Colon cancer Neg Hx     Ovarian cancer Neg Hx         Social History:  Social History     Substance and Sexual Activity   Alcohol Use Never     Social History     Tobacco Use   Smoking Status Former    Current packs/day: 0.25    Types: Cigarettes   Smokeless Tobacco Never   Tobacco Comments    Quit in 2020         Physical Examination:  Vital signs are within normal range  General appearance - alert, well appearing, and in no distress. Obese  Mental status - normal mood, behavior, speech, dress, motor activity, and thought processes  Head/Eyes:  NC/AT EOMI; no staring gaze/ lid lag  mucous membranes moist, pharynx normal without lesions  Neck - supple, no significant adenopathy, postsurgical scar, non-tender, no local inflammation  Chest - clear to auscultation, no wheezes, rales or rhonchi, symmetric air entry  Heart - normal rate, regular rhythm, normal S1, S2, no murmurs  B/L LE NON-pitting edema  Thyroid - s/p total thyroidectomy     Labs:    Lab Results   Component Value Date    ZMG1OWFVTXPL 107.210 (H)  "01/05/2025           Lab Results   Component Value Date    BUN 18 01/06/2025     11/26/2015    K 3.9 01/06/2025     01/06/2025    CO2 26 01/06/2025        Lab Results   Component Value Date    CALCIUM 7.6 (L) 01/06/2025    PHOS 4.9 (H) 01/05/2025        Imaging:     Previous records including pertinent laboratory and radiographic results were reviewed and are summarized in the HPI and plan below.  - Discussed the pathophysiology, manifestations, differnential diagnosis and management of hypothyroidism.   - Reviewed the hypothalamic-ptiuitary-thyroid axis and interpretation and significance of TSH, FT4, FT3 values.  - Discussed indications for treatment of hypothyroidism and the probability that overtime the dose of levotyroxine may need titration and will need monitoring.  - Advised that levotyroxine should be taken on an empty stomach. Should avoid taking medications like iron, calcium, tums, G4ainayxiy, PPI at the same time that may decrease absorption of T4. Should separate administration by 4hrs.     Janay Sandra MD  PGY-3 IM      Portions of the record may have been created with voice recognition software.  Occasional wrong word or \"sound a like\" substitutions may have occurred due to the inherent limitations of voice recognition software.  Read the chart carefully and recognize, using context, where substitutions have occurred.        "

## 2025-01-06 NOTE — ASSESSMENT & PLAN NOTE
Hemoglobin 10.8 on admission, has been low in the past as well.  Most recent labs from 2022 showing hemoglobin 15.3.  She denies any GI bleeding or menorrhagia at this time  Iron panel indicating iron deficiency anemia    consider GI referral upon discharge for further evaluation

## 2025-01-06 NOTE — UTILIZATION REVIEW
"Initial Clinical Review    Admission: Date/Time/Statement:   Admission Orders (From admission, onward)       Ordered        01/05/25 0825  Place in Observation  Once                          Orders Placed This Encounter   Procedures    Place in Observation     Standing Status:   Standing     Number of Occurrences:   1     Level of Care:   Med Surg [16]     ED Arrival Information       Expected   -    Arrival   1/5/2025 06:05    Acuity   Urgent              Means of arrival   Walk-In    Escorted by   Self    Service   Hospitalist    Admission type   Emergency              Arrival complaint   Congestion, Weakness             Chief Complaint   Patient presents with    Medical Problem     Pt reports \"got stomach bug 2 weeks ago and having congestion and my face and legs and arms feels so tight\" denies cp/sob       Initial Presentation: 44 y.o. female to ED presents self w edema, fatigue, recent GI illness;   Observation admission due to post op hypothyroidism, edema, hypocalcemia, hypoparathyroidism, CR elevation on CKD ( CR baseline (baseline 1-1.1)  PMH postoperative hypothyroidism status post total thyroidectomy, anemia, postoperative hypoparathyroidism  She was last seen by endocrinology in 1/2024 and restarted her thyroid medications as she previously stopped taking them in the past.  GI symptoms started 12/15 consistent of diarrhea, nausea, and vomiting. Edema in her hands, feet, and face started 2-3 weeks prior to onset of GI symptoms. She was unable to fit in certain shoes. All she could consume during GI illness was chicken noodle soup, gatorade, and liquid IV. For the past 2 days she has just been drinking water and 2 pieces of toast and continues to endorse swelling. 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 Endorses fatigue, lightheadedness upon standing up, nausea. No sick contacts.   OP Last seen by Endo 1/2024 and  prescribed synthroid 150mcg 1 tab daily M-S and Sunday 2 tabs, " Calcitriol 0.25mcg daily, Calcium 1000mg tid. Reports she has missed a few doses of her thyroid medication because she noted feeling worse when she takes it. Denies paraesthesias,  more hair loss than usual in addition to dry skin. No chest pain or shortness of breath.     EXAM  CMP notable for hypokalemia, creatinine 1.41  elevated from prior values slightly.    BUN 20.  Calcium ionized suboptimal, 0.96.  Phosphorus slightly elevated 4.9.  Notably, TSH elevated 107, free T4 pending.    PLAN  Start weight based levothyroxine 1.6 mcg/kg daily ~ approximately 200 mcg levothyroxine daily.   Appreciate endocrinology consultation  F/u PTH levels  Given 1g IV calcium gluconate, resume calcium carbonate 1000 mg BID with meals  Hold off on IV fluids given edema, start compression stockings LE and 2g Na restriction  F/u 25-vit D levels for now continue D2 supplementation, will adjust based on levels  Avoid NSAIDS/nephrotoxic agents.  Keep MAP >65   Repeat iron panel, if still consistent with AMY, recommend outpatient GI eval with colonoscopy    Anticipated Length of Stay/Certification Statement: Patient will be admitted on an Observation basis with an anticipated length of stay of  < 2 midnights.   Justification for Hospital Stay: Endocrinology consult, w/u for hypothyroidism/hypoparathyroidism and treatment for symptomatic hypocalcemia.    Date: 1/6/2024   Day 2:     ED Treatment-Medication Administration from 01/05/2025 0605 to 01/05/2025 1742         Date/Time Order Dose Route Action     01/05/2025 0732 potassium chloride (Klor-Con M20) CR tablet 40 mEq 40 mEq Oral Given     01/05/2025 1203 enoxaparin (LOVENOX) subcutaneous injection 40 mg 40 mg Subcutaneous Given     01/05/2025 1202 calcitriol (ROCALTROL) capsule 0.25 mcg 0.25 mcg Oral Given     01/05/2025 1203 calcium gluconate 1 g in sodium chloride 0.9% 50 mL (premix) 1 g Intravenous New Bag     01/05/2025 1234 levothyroxine tablet 200 mcg 200 mcg Oral Given             Scheduled Medications:  calcitriol, 0.25 mcg, Oral, BID  calcium carbonate, 1,000 mg, Oral, BID before lunch/dinner  calcium gluconate, 2 g, Intravenous, Once  Cholecalciferol, 1,000 Units, Oral, Daily  enoxaparin, 40 mg, Subcutaneous, Q12H CANDI  levothyroxine, 200 mcg, Oral, Early Morning      Continuous IV Infusions:     PRN Meds:  benzonatate, 100 mg, Oral, TID PRN      ED Triage Vitals   Temperature Pulse Respirations Blood Pressure SpO2 Pain Score   01/05/25 0617 01/05/25 0617 01/05/25 0617 01/05/25 0617 01/05/25 0617 01/05/25 1809   98 °F (36.7 °C) 89 18 125/65 97 % No Pain     Weight (last 2 days)       Date/Time Weight    01/06/25 0540 124 (274.4)    01/05/25 0711 125 (276.24)            Vital Signs (last 3 days)       Date/Time Temp Pulse Resp BP MAP (mmHg) SpO2 Patient Position - Orthostatic VS Elias Coma Scale Score Pain    01/06/25 0807 97.8 °F (36.6 °C) 79 15 132/84 100 96 % Sitting -- --    01/05/25 2212 98.1 °F (36.7 °C) 64 18 96/54 70 95 % Lying -- No Pain    01/05/25 1809 -- -- -- -- -- -- -- 15 No Pain    01/05/25 1803 98.3 °F (36.8 °C) 81 -- 126/81 98 95 % -- -- --    01/05/25 0700 -- -- -- -- -- -- -- 15 --    01/05/25 0649 -- 79 -- 107/60 -- -- Standing for 3 minutes - Orthostatic VS -- --    01/05/25 0646 -- 84 -- 116/63 -- -- Standing - Orthostatic VS -- --    01/05/25 0645 -- 81 -- 117/58 85 -- Sitting - Orthostatic VS -- --    01/05/25 0643 -- 75 -- 115/68 -- -- Lying - Orthostatic VS -- --    01/05/25 0617 98 °F (36.7 °C) 89 18 125/65 -- 97 % -- -- --              Pertinent Labs/Diagnostic Test Results:   Radiology:  No orders to display     Cardiology:  ECG 12 lead    by Interface, Ris Results In (01/05 0657)        GI:  No orders to display           Results from last 7 days   Lab Units 01/06/25  0452 01/05/25  0656   WBC Thousand/uL 8.00 8.43   HEMOGLOBIN g/dL 11.3* 10.8*   HEMATOCRIT % 36.1 32.9*   PLATELETS Thousands/uL 224 227   TOTAL NEUT ABS Thousands/µL 4.14 4.98        "  Results from last 7 days   Lab Units 01/06/25  0452 01/05/25  0656   SODIUM mmol/L 138 137   POTASSIUM mmol/L 3.9 3.3*   CHLORIDE mmol/L 102 103   CO2 mmol/L 26 24   ANION GAP mmol/L 10 10   BUN mg/dL 18 20   CREATININE mg/dL 1.40* 1.41*   EGFR ml/min/1.73sq m 45 45   CALCIUM mg/dL 7.6* 7.9*   CALCIUM, IONIZED mmol/L 0.93* 0.96*   MAGNESIUM mg/dL 2.1 2.1   PHOSPHORUS mg/dL  --  4.9*     Results from last 7 days   Lab Units 01/06/25  0452 01/05/25  0656   AST U/L 38 42*   ALT U/L 23 27   ALK PHOS U/L 58 62   TOTAL PROTEIN g/dL 7.4 7.4   ALBUMIN g/dL 4.4 4.4   TOTAL BILIRUBIN mg/dL 0.63 0.54         Results from last 7 days   Lab Units 01/06/25  0452 01/05/25  0656   GLUCOSE RANDOM mg/dL 96 135         Results from last 7 days   Lab Units 01/05/25  1235   HEMOGLOBIN A1C % 6.1*   EAG mg/dl 128     No results found for: \"BETA-HYDROXYBUTYRATE\"                   Results from last 7 days   Lab Units 01/05/25  0656   HS TNI 0HR ng/L <2             Results from last 7 days   Lab Units 01/05/25  0656   TSH 3RD GENERATON uIU/mL 107.210*                     Results from last 7 days   Lab Units 01/05/25  0656   BNP pg/mL 9     Results from last 7 days   Lab Units 01/05/25  0656   IRON SATURATION % 11*   IRON ug/dL 49*   TIBC ug/dL 466.2*     Results from last 7 days   Lab Units 01/05/25  0656   TRANSFERRIN mg/dL 333                             Results from last 7 days   Lab Units 01/05/25  1244   CLARITY UA  Clear   COLOR UA  Yellow   SPEC GRAV UA  1.033*   PH UA  6.0   GLUCOSE UA mg/dl Negative   KETONES UA mg/dl Negative   BLOOD UA  Negative   PROTEIN UA mg/dl Trace*   NITRITE UA  Negative   BILIRUBIN UA  Negative   UROBILINOGEN UA (BE) mg/dl <2.0   LEUKOCYTES UA  Trace*   WBC UA /hpf 2-4*   RBC UA /hpf 2-4*   BACTERIA UA /hpf Occasional   EPITHELIAL CELLS WET PREP /hpf Occasional   MUCUS THREADS  Moderate*                                                   Past Medical History:   Diagnosis Date    Anemia     H/O total " thyroidectomy 10/2016    History of parathyroidectomy 10/2015    Hypothyroidism      Present on Admission:   Postoperative hypothyroidism   Hypocalcemia   Vitamin D deficiency   Hypoparathyroidism (HCC)      Admitting Diagnosis: Edema [R60.9]  Weakness [R53.1]  Hypothyroidism [E03.9]  RAFI (acute kidney injury) (HCC) [N17.9]  History of parathyroidectomy [Z98.890, Z90.89]  Hypoparathyroidism, unspecified hypoparathyroidism type (HCC) [E20.9]  Age/Sex: 44 y.o. female    Network Utilization Review Department  ATTENTION: Please call with any questions or concerns to [phone] and carefully listen to the prompts so that you are directed to the right person. All voicemails are confidential.   For Discharge needs, contact Care Management DC Support Team at [phone] opt. [phone]  Send all requests for admission clinical reviews, approved or denied determinations and any other requests to dedicated fax number below belonging to the campus where the patient is receiving treatment. List of dedicated fax numbers for the Facilities:  FACILITY NAME UR FAX NUMBER   ADMISSION DENIALS (Administrative/Medical Necessity) [phone]   DISCHARGE SUPPORT TEAM (NETWORK) [phone]   PARENT CHILD HEALTH (Maternity/NICU/Pediatrics) [phone]   [facility] [phone]   [facility] [phone]   [facility] [phone]   [facility] [phone]   [facility] [phone]   [facility] [phone]   [facility] [phone]   [facility] [phone]   [facility] [phone]   Critical access hospital [phone]   [facility] [phone]   [facility] [phone]

## 2025-01-06 NOTE — ASSESSMENT & PLAN NOTE
Follows with endocrinology outpatient, was last seen 1/24.  Has a history of postoperative hypothyroidism since age 18 status post total thyroidectomy in 2015 due to large multinodular goiter with obstructive symptoms  At home is supposed to be taking 150 mcg 1 tablet daily Monday through Saturday and 2 tablets on Sunday.  Reports some missed doses, is not 100% compliant  Presenting with nausea, fatigue, hair loss, brittle skin with TSH elevated 107  T4 decreased <0.25  Does not appear to be in myxedema coma at this time    Will continue 200 mcg levothyroxine daily for now  Endocrinology consulted, appreciate recommendations  Check ionized calcium and serum calcium

## 2025-01-06 NOTE — ASSESSMENT & PLAN NOTE
History of postoperative hypoparathyroidism  At home as prescribed calcitriol 0.25 mcg daily, calcium carbonate 1200 mg BID  PTH level at 4.4

## 2025-01-06 NOTE — DISCHARGE INSTR - AVS FIRST PAGE
Dear Kateryna Parks,     It was our pleasure to care for you here at [facility].  It is our hope that we were always able to exceed the expected standards for your care during your stay.  You were hospitalized due to hypothyroidism.  You were cared for on the first floor by Sudheer Cooney MD under the service of Asif Daniel MD with the [facility] who covers for your primary care physician (PCP), LOVELY Rodrigez, while you were hospitalized.  If you have any questions or concerns related to this hospitalization, you may contact us at .  For follow up as well as any medication refills, we recommend that you follow up with your primary care physician.  A registered nurse will reach out to you by phone within a few days after your discharge to answer any additional questions that you may have after going home.  However, at this time we provide for you here, the most important instructions / recommendations at discharge:     Notable Medication Adjustments -   Change Levothyroxine to 200 mcg taken every morning. You used to take 150 mcg of Synthroid. Please make sure to take medication early morning on empty stomach and await at least 60 minutes prior to breakfast and other medications.   Start taking calcium carbonate 500 mg three times a day with meals  Start taking vitamin D3 1000 units daily.  Please continue taking all of your home medications as prescribed  Testing Required after Discharge -   TSH in 4 weeks  ** Please contact your PCP to request testing orders for any of the testing recommended here **  Important follow up information -   Please follow-up with your PCP within 1 week  Other Instructions -   Please return to the emergency department if you experience worsening of your symptoms  Please review this entire after visit summary as additional general instructions including medication list, appointments, activity,  diet, any pertinent wound care, and other additional recommendations from your care team that may be provided for you.      Sincerely,     Sudheer Cooney MD

## 2025-01-13 DIAGNOSIS — E55.9 VITAMIN D DEFICIENCY: ICD-10-CM

## 2025-01-15 LAB
ATRIAL RATE: 98 BPM
P AXIS: 61 DEGREES
PR INTERVAL: 150 MS
QRS AXIS: 115 DEGREES
QRSD INTERVAL: 74 MS
QT INTERVAL: 374 MS
QTC INTERVAL: 477 MS
T WAVE AXIS: 250 DEGREES
VENTRICULAR RATE: 98 BPM

## 2025-01-15 PROCEDURE — 93010 ELECTROCARDIOGRAM REPORT: CPT | Performed by: INTERNAL MEDICINE

## 2025-01-15 RX ORDER — ERGOCALCIFEROL 1.25 MG/1
CAPSULE, LIQUID FILLED ORAL
Qty: 12 CAPSULE | Refills: 0 | OUTPATIENT
Start: 2025-01-15

## 2025-01-15 NOTE — TELEPHONE ENCOUNTER
duplicate request sent to [facility][id number] on 01/06/25   with 12 and 0 refill(s)

## 2025-06-02 ENCOUNTER — TELEPHONE (OUTPATIENT)
Dept: OTHER | Facility: OTHER | Age: 45
End: 2025-06-02

## 2025-06-02 NOTE — TELEPHONE ENCOUNTER
Patient is calling regarding cancelling an appointment.    Date/Time: 6/2/25 3:00pm    Patient was rescheduled: YES [] NO [x]    Patient requesting call back to reschedule: YES [x] NO []    Please call pt to reschedule when office reopens. Thank you